# Patient Record
Sex: FEMALE | Race: ASIAN | Employment: UNEMPLOYED | URBAN - METROPOLITAN AREA
[De-identification: names, ages, dates, MRNs, and addresses within clinical notes are randomized per-mention and may not be internally consistent; named-entity substitution may affect disease eponyms.]

---

## 2018-04-26 ENCOUNTER — HOSPITAL ENCOUNTER (OUTPATIENT)
Dept: ULTRASOUND IMAGING | Age: 53
Discharge: HOME | End: 2018-04-26

## 2018-04-26 ENCOUNTER — CLINICAL DOCUMENTATION (OUTPATIENT)
Dept: CASE MANAGEMENT | Age: 53
End: 2018-04-26

## 2018-04-26 DIAGNOSIS — R74.8 ELEVATED LIVER ENZYMES: ICD-10-CM

## 2018-04-26 DIAGNOSIS — Z12.31 VISIT FOR SCREENING MAMMOGRAM: ICD-10-CM

## 2018-04-26 DIAGNOSIS — R74.8 ABNORMAL LEVELS OF OTHER SERUM ENZYMES: ICD-10-CM

## 2018-05-16 ENCOUNTER — HOSPITAL ENCOUNTER (OUTPATIENT)
Dept: MAMMOGRAPHY | Age: 53
Discharge: HOME | End: 2018-05-16

## 2018-05-16 DIAGNOSIS — R92.8 ABNORMAL MAMMOGRAM: ICD-10-CM

## 2018-09-06 ENCOUNTER — HOSPITAL ENCOUNTER (OUTPATIENT)
Dept: NEUROLOGY | Age: 53
Discharge: HOME | End: 2018-09-06

## 2018-09-06 DIAGNOSIS — R20.0 ANESTHESIA OF SKIN: ICD-10-CM
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
## 2018-10-04 PROCEDURE — 93005 ELECTROCARDIOGRAM TRACING: CPT | Performed by: EMERGENCY MEDICINE

## 2018-10-04 RX ORDER — IBUPROFEN 600 MG/1
TABLET ORAL
Refills: 2 | COMMUNITY
Start: 2018-08-26 | End: 2018-10-05

## 2018-10-04 RX ORDER — IBUPROFEN AND FAMOTIDINE 800; 26.6 MG/1; MG/1
TABLET, COATED ORAL
Refills: 2 | COMMUNITY
Start: 2018-08-26 | End: 2021-08-05

## 2018-10-04 RX ORDER — BUSPIRONE HYDROCHLORIDE 10 MG/1
TABLET ORAL
Refills: 1 | COMMUNITY
Start: 2018-08-31 | End: 2021-08-05

## 2018-10-04 RX ORDER — PAROXETINE 30 MG/1
TABLET, FILM COATED ORAL
Refills: 1 | COMMUNITY
Start: 2018-08-31 | End: 2021-08-05

## 2018-10-05 ENCOUNTER — HOSPITAL ENCOUNTER (EMERGENCY)
Age: 53
Discharge: HOME | End: 2018-10-05
Attending: EMERGENCY MEDICINE
Payer: COMMERCIAL

## 2018-10-05 VITALS
TEMPERATURE: 98.2 F | SYSTOLIC BLOOD PRESSURE: 101 MMHG | BODY MASS INDEX: 29.45 KG/M2 | DIASTOLIC BLOOD PRESSURE: 71 MMHG | OXYGEN SATURATION: 99 % | WEIGHT: 150 LBS | HEIGHT: 60 IN | HEART RATE: 61 BPM | RESPIRATION RATE: 18 BRPM

## 2018-10-05 DIAGNOSIS — R51.9 NONINTRACTABLE HEADACHE, UNSPECIFIED CHRONICITY PATTERN, UNSPECIFIED HEADACHE TYPE: ICD-10-CM

## 2018-10-05 DIAGNOSIS — R42 DIZZINESS: Primary | ICD-10-CM

## 2018-10-05 LAB
BACTERIA: ABNORMAL /HPF
BILIRUBIN URINE: NEGATIVE
BLOOD, URINE: ABNORMAL
CLARITY: CLEAR
COLOR: YELLOW
EPITHELIAL CELLS, UA: 2 /HPF (ref 0–5)
GLUCOSE URINE: NEGATIVE MG/DL
HYALINE CASTS: 0 /LPF (ref 0–8)
KETONES, URINE: NEGATIVE MG/DL
LEUKOCYTE ESTERASE, URINE: NEGATIVE
MICROSCOPIC EXAMINATION: YES
NITRITE, URINE: NEGATIVE
PH UA: 5.5
PROTEIN UA: NEGATIVE MG/DL
RBC UA: 1 /HPF (ref 0–4)
SPECIFIC GRAVITY UA: 1.01
URINE REFLEX TO CULTURE: ABNORMAL
URINE TYPE: ABNORMAL
UROBILINOGEN, URINE: 0.2 E.U./DL
WBC UA: 2 /HPF (ref 0–5)

## 2018-10-05 PROCEDURE — 99284 EMERGENCY DEPT VISIT MOD MDM: CPT

## 2018-10-05 PROCEDURE — 81001 URINALYSIS AUTO W/SCOPE: CPT

## 2018-10-05 PROCEDURE — 6360000002 HC RX W HCPCS: Performed by: EMERGENCY MEDICINE

## 2018-10-05 PROCEDURE — 93010 ELECTROCARDIOGRAM REPORT: CPT | Performed by: INTERNAL MEDICINE

## 2018-10-05 PROCEDURE — 6370000000 HC RX 637 (ALT 250 FOR IP): Performed by: EMERGENCY MEDICINE

## 2018-10-05 RX ORDER — IBUPROFEN 600 MG/1
600 TABLET ORAL EVERY 6 HOURS PRN
Qty: 120 TABLET | Refills: 0 | OUTPATIENT
Start: 2018-10-05 | End: 2021-08-05

## 2018-10-05 RX ORDER — ACETAMINOPHEN 500 MG
1000 TABLET ORAL ONCE
Status: COMPLETED | OUTPATIENT
Start: 2018-10-05 | End: 2018-10-05

## 2018-10-05 RX ORDER — MECLIZINE HYDROCHLORIDE 25 MG/1
25 TABLET ORAL 3 TIMES DAILY PRN
Qty: 30 TABLET | Refills: 0 | Status: SHIPPED | OUTPATIENT
Start: 2018-10-05 | End: 2022-07-14 | Stop reason: SDUPTHER

## 2018-10-05 RX ORDER — ONDANSETRON 4 MG/1
4 TABLET, ORALLY DISINTEGRATING ORAL ONCE
Status: COMPLETED | OUTPATIENT
Start: 2018-10-05 | End: 2018-10-05

## 2018-10-05 RX ADMIN — ACETAMINOPHEN 1000 MG: 500 TABLET ORAL at 02:12

## 2018-10-05 RX ADMIN — ONDANSETRON 4 MG: 4 TABLET, ORALLY DISINTEGRATING ORAL at 02:12

## 2018-10-05 ASSESSMENT — PAIN SCALES - GENERAL: PAINLEVEL_OUTOF10: 10

## 2018-10-05 NOTE — ED PROVIDER NOTES
Triage Chief Complaint:   Dizziness (pt c/o room spinning, emesis- started this afternoon)    [place]:  Nilda Siddiqui is a 47 y.o. female that presents to the emergency department today stating that she's been having a headache and some dizziness. She states that after the dizziness started she started to have some nausea and vomiting. She denies any vision changes. She states that she did not have any syncope. She denies any chest pains or shortness of breath. Patient did not take anything for this. She states that she has had this from time to time but it was worse today and so her son brought her in for further evaluation. ROS:  General:  No fevers, no chills, no weakness  Eyes:  No recent vison changes, no discharge  ENT:  No sore throat, no nasal congestion, no hearing changes  Cardiovascular:  No chest pain  Respiratory:  No shortness of breath  Gastrointestinal:  No pain, no nausea, no vomiting, no diarrhea  Musculoskeletal:  No muscle pain, no joint pain  Skin:  No rash, no pruritis, no easy bruising  Neurologic:  No speech problems, + vertigo & headache, no extremity numbness, no extremity tingling, no extremity weakness, no neck pain or stiffness  Psychiatric:  No anxiety  Extremities:  no edema, no pain    History reviewed. No pertinent past medical history. History reviewed. No pertinent surgical history. History reviewed. No pertinent family history. Social History     Social History    Marital status:      Spouse name: N/A    Number of children: N/A    Years of education: N/A     Occupational History    Not on file. Social History Main Topics    Smoking status: Never Smoker    Smokeless tobacco: Never Used    Alcohol use No    Drug use: Unknown    Sexual activity: Not on file     Other Topics Concern    Not on file     Social History Narrative    No narrative on file     No current facility-administered medications for this encounter.       Current Outpatient Prescriptions

## 2018-10-09 LAB
EKG ATRIAL RATE: 67 BPM
EKG DIAGNOSIS: NORMAL
EKG P AXIS: 10 DEGREES
EKG P-R INTERVAL: 134 MS
EKG Q-T INTERVAL: 398 MS
EKG QRS DURATION: 72 MS
EKG QTC CALCULATION (BAZETT): 420 MS
EKG R AXIS: -9 DEGREES
EKG T AXIS: 22 DEGREES
EKG VENTRICULAR RATE: 67 BPM

## 2018-12-11 ENCOUNTER — TELEPHONE (OUTPATIENT)
Dept: ENT CLINIC | Age: 53
End: 2018-12-11

## 2018-12-18 ENCOUNTER — HOSPITAL ENCOUNTER (OUTPATIENT)
Dept: PHYSICAL THERAPY | Age: 53
Setting detail: THERAPIES SERIES
Discharge: HOME | End: 2018-12-18
Payer: COMMERCIAL

## 2018-12-18 PROCEDURE — 97530 THERAPEUTIC ACTIVITIES: CPT

## 2018-12-18 PROCEDURE — 97112 NEUROMUSCULAR REEDUCATION: CPT

## 2018-12-18 PROCEDURE — 97162 PT EVAL MOD COMPLEX 30 MIN: CPT

## 2018-12-18 PROCEDURE — G8990 OTHER PT/OT CURRENT STATUS: HCPCS

## 2018-12-18 PROCEDURE — G8991 OTHER PT/OT GOAL STATUS: HCPCS
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
## 2018-12-26 ENCOUNTER — HOSPITAL ENCOUNTER (OUTPATIENT)
Dept: PHYSICAL THERAPY | Age: 53
Setting detail: THERAPIES SERIES
Discharge: HOME | End: 2018-12-26
Payer: COMMERCIAL

## 2018-12-26 PROCEDURE — 97112 NEUROMUSCULAR REEDUCATION: CPT

## 2018-12-26 NOTE — FLOWSHEET NOTE
perform. A detailed handout was provided to the patient. Manual Treatments:  12/18 - consider CRT as needed, manual stretches for cervical spine    Modalities: 12/18 - Consider MOC    Timed Code Treatment Minutes:  23    Total Treatment Minutes:  23    Treatment/Activity Tolerance:  [x] Patient tolerated treatment well [] Patient limited by fatigue  [] Patient limited by pain  [] Patient limited by other medical complications  [x] Other: Patient was able to perform some habituation exercises without significant, lasting dizziness. Patient demos nystagmus with horizontal head movements.      Prognosis: [x] Good [x] Fair  [] Poor    Patient Requires Follow-up: [x] Yes  [] No    Plan:   [x] Continue per plan of care [] Alter current plan (see comments)  [] Plan of care initiated [] Hold pending MD visit [] Discharge    Plan for Next Session:  Assess response to HEP, continue habituation exercises, administer DGI, balance tasks    Electronically signed by:  Lenora Caro PT

## 2019-01-07 ENCOUNTER — HOSPITAL ENCOUNTER (OUTPATIENT)
Dept: PHYSICAL THERAPY | Age: 54
Setting detail: THERAPIES SERIES
Discharge: HOME | End: 2019-01-07
Payer: COMMERCIAL

## 2019-01-07 PROCEDURE — 97116 GAIT TRAINING THERAPY: CPT

## 2019-01-07 PROCEDURE — 97110 THERAPEUTIC EXERCISES: CPT

## 2019-01-07 PROCEDURE — 97112 NEUROMUSCULAR REEDUCATION: CPT

## 2019-01-09 ENCOUNTER — APPOINTMENT (OUTPATIENT)
Dept: PHYSICAL THERAPY | Age: 54
End: 2019-01-09
Payer: COMMERCIAL

## 2019-01-15 ENCOUNTER — APPOINTMENT (OUTPATIENT)
Dept: PHYSICAL THERAPY | Age: 54
End: 2019-01-15
Payer: COMMERCIAL

## 2019-01-17 ENCOUNTER — APPOINTMENT (OUTPATIENT)
Dept: PHYSICAL THERAPY | Age: 54
End: 2019-01-17
Payer: COMMERCIAL

## 2019-01-22 ENCOUNTER — APPOINTMENT (OUTPATIENT)
Dept: PHYSICAL THERAPY | Age: 54
End: 2019-01-22
Payer: COMMERCIAL

## 2019-01-24 ENCOUNTER — APPOINTMENT (OUTPATIENT)
Dept: PHYSICAL THERAPY | Age: 54
End: 2019-01-24
Payer: COMMERCIAL

## 2019-04-18 ENCOUNTER — HOSPITAL ENCOUNTER (EMERGENCY)
Age: 54
Discharge: HOME | End: 2019-04-18
Payer: COMMERCIAL

## 2019-04-18 VITALS
BODY MASS INDEX: 29.45 KG/M2 | HEIGHT: 60 IN | DIASTOLIC BLOOD PRESSURE: 89 MMHG | SYSTOLIC BLOOD PRESSURE: 138 MMHG | OXYGEN SATURATION: 100 % | RESPIRATION RATE: 15 BRPM | TEMPERATURE: 98.2 F | WEIGHT: 150 LBS | HEART RATE: 79 BPM

## 2019-04-18 DIAGNOSIS — H10.45 OTHER CHRONIC ALLERGIC CONJUNCTIVITIS OF BOTH EYES: ICD-10-CM

## 2019-04-18 DIAGNOSIS — J30.2 SEASONAL ALLERGIC RHINITIS, UNSPECIFIED TRIGGER: Primary | ICD-10-CM

## 2019-04-18 PROCEDURE — 99282 EMERGENCY DEPT VISIT SF MDM: CPT

## 2019-04-18 RX ORDER — LORATADINE 10 MG/1
10 TABLET ORAL DAILY
Qty: 30 TABLET | Refills: 0 | Status: SHIPPED | OUTPATIENT
Start: 2019-04-18 | End: 2021-08-05

## 2019-04-18 RX ORDER — KETOTIFEN FUMARATE 0.35 MG/ML
1 SOLUTION/ DROPS OPHTHALMIC 2 TIMES DAILY
Qty: 1 ML | Refills: 0 | Status: SHIPPED | OUTPATIENT
Start: 2019-04-18 | End: 2019-04-28

## 2019-04-18 RX ORDER — FLUTICASONE PROPIONATE 50 MCG
1 SPRAY, SUSPENSION (ML) NASAL DAILY
Qty: 1 BOTTLE | Refills: 0 | Status: SHIPPED | OUTPATIENT
Start: 2019-04-18 | End: 2021-08-05

## 2019-04-18 ASSESSMENT — ENCOUNTER SYMPTOMS
NAUSEA: 0
DIARRHEA: 0
ABDOMINAL PAIN: 0
VOMITING: 0
COUGH: 0
WHEEZING: 0
SHORTNESS OF BREATH: 0
RHINORRHEA: 0

## 2019-04-19 NOTE — ED PROVIDER NOTES
Positives and Pertinent negatives as per HPI. Except as noted abovein the ROS, all other systems were reviewed and negative. PAST MEDICAL HISTORY   History reviewed. No pertinent past medical history. SURGICAL HISTORY   History reviewed. No pertinent surgical history. [id number]       Discharge Medication List as of 4/18/2019  9:08 PM      CONTINUE these medications which have NOT CHANGED    Details   ibuprofen (IBU) 600 MG tablet Take 1 tablet by mouth every 6 hours as needed for Pain, Disp-120 tablet, R-0Print      busPIRone (BUSPAR) 10 MG tablet TAKE 1 TABLET BY MOUTH TWICE A DAY, R-1Historical Med      DUEXIS 800-26.6 MG TABS TAKE 1 TABLET BY ORAL ROUTE 2 TIMES EVERY DAY AS NEEDED FOR PAIN, R-2, DAWHistorical Med      PARoxetine (PAXIL) 30 MG tablet TAKE 1 TABLET BY MOUTH EVERY DAY, R-1Historical Med               ALLERGIES     Patient has no known allergies. FAMILYHISTORY     History reviewed. No pertinent family history.        SOCIAL HISTORY       Social History     Socioeconomic History    Marital status:      Spouse name: None    Number of children: None    Years of education: None    Highest education level: None   Occupational History    None   Social Needs    Financial resource strain: None    Food insecurity:     Worry: None     Inability: None    Transportation needs:     Medical: None     Non-medical: None   Tobacco Use    Smoking status: Never Smoker    Smokeless tobacco: Never Used   Substance and Sexual Activity    Alcohol use: No    Drug use: None    Sexual activity: None   Lifestyle    Physical activity:     Days per week: None     Minutes per session: None    Stress: None   Relationships    Social connections:     Talks on phone: None     Gets together: None     Attends Yazidi service: None     Active member of club or organization: None     Attends meetings of clubs or organizations: None     Relationship status: None    Intimate partner violence:     Fear of current or ex partner: None     Emotionally abused: None     Physically abused: None     Forced sexual activity: None   Other Topics Concern    None   Social History Narrative    None       SCREENINGS             PHYSICAL EXAM    (up to 7 for level 4, 8 or more for level 5)     ED Triage Vitals [04/18/19 2036]   BP Temp Temp Source Pulse Resp SpO2 Height Weight   138/89 98.2 °F (36.8 °C) Oral 79 15 100 % 5' (1.524 m) 150 lb (68 kg)       Physical Exam   Constitutional: She is oriented to person, place, and time. She appears well-developed and well-nourished. HENT:   Head: Normocephalic and atraumatic. Right Ear: External ear normal.   Left Ear: External ear normal.   Nose: Nose normal.   Eyes: Right eye exhibits no discharge. Left eye exhibits no discharge. Neck: Normal range of motion. Neck supple. Pulmonary/Chest: Effort normal. No respiratory distress. Musculoskeletal: Normal range of motion. Neurological: She is alert and oriented to person, place, and time. Skin: Skin is warm and dry. She is not diaphoretic. Psychiatric: She has a normal mood and affect. Her behavior is normal.   Nursing note and vitals reviewed. DIAGNOSTIC RESULTS   LABS:    Labs Reviewed - No data to display    All other labs were within normal range or not returned as of this dictation. EKG: All EKG's are interpreted by the Emergency Department Physician who either signs orCo-signs this chart in the absence of a cardiologist.  Please see their note for interpretation of EKG. RADIOLOGY:   Non-plain film images such as CT, Ultrasound and MRI are read by the radiologist. Plain radiographic images are visualized andpreliminarily interpreted by the  ED Provider with the below findings:        Interpretation [facility] Radiologist below, if available at the time of this note:    No orders to display     No results found.       PROCEDURES   Unless otherwise noted below, none

## 2020-06-11 ENCOUNTER — HOSPITAL ENCOUNTER (EMERGENCY)
Age: 55
Discharge: HOME | End: 2020-06-11
Payer: COMMERCIAL

## 2020-06-11 ENCOUNTER — APPOINTMENT (OUTPATIENT)
Dept: GENERAL RADIOLOGY | Age: 55
End: 2020-06-11
Payer: COMMERCIAL

## 2020-06-11 VITALS
HEART RATE: 72 BPM | SYSTOLIC BLOOD PRESSURE: 130 MMHG | BODY MASS INDEX: 29.45 KG/M2 | HEIGHT: 60 IN | TEMPERATURE: 98.3 F | DIASTOLIC BLOOD PRESSURE: 88 MMHG | WEIGHT: 150 LBS | OXYGEN SATURATION: 96 % | RESPIRATION RATE: 14 BRPM

## 2020-06-11 PROCEDURE — 72100 X-RAY EXAM L-S SPINE 2/3 VWS: CPT

## 2020-06-11 PROCEDURE — 99283 EMERGENCY DEPT VISIT LOW MDM: CPT

## 2020-06-11 RX ORDER — CYCLOBENZAPRINE HCL 10 MG
10 TABLET ORAL 3 TIMES DAILY PRN
Qty: 20 TABLET | Refills: 0 | Status: SHIPPED | OUTPATIENT
Start: 2020-06-11 | End: 2020-06-21

## 2020-06-11 RX ORDER — LIDOCAINE 50 MG/G
1 PATCH TOPICAL DAILY
Qty: 30 PATCH | Refills: 0 | Status: SHIPPED | OUTPATIENT
Start: 2020-06-11 | End: 2021-07-12

## 2020-06-11 RX ORDER — NAPROXEN 500 MG/1
500 TABLET ORAL 2 TIMES DAILY
Qty: 20 TABLET | Refills: 0 | OUTPATIENT
Start: 2020-06-11 | End: 2021-08-05

## 2020-06-11 ASSESSMENT — PAIN SCALES - GENERAL: PAINLEVEL_OUTOF10: 5

## 2020-06-11 ASSESSMENT — PAIN DESCRIPTION - LOCATION: LOCATION: BACK

## 2020-06-11 ASSESSMENT — PAIN DESCRIPTION - DESCRIPTORS: DESCRIPTORS: ACHING

## 2020-06-11 ASSESSMENT — PAIN DESCRIPTION - ORIENTATION: ORIENTATION: RIGHT;LOWER

## 2020-06-11 ASSESSMENT — PAIN DESCRIPTION - PAIN TYPE: TYPE: CHRONIC PAIN
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
## 2020-06-17 ENCOUNTER — HOSPITAL ENCOUNTER (EMERGENCY)
Age: 55
Discharge: HOME | End: 2020-06-17
Payer: COMMERCIAL

## 2020-06-17 ENCOUNTER — APPOINTMENT (OUTPATIENT)
Dept: CT IMAGING | Age: 55
End: 2020-06-17
Payer: COMMERCIAL

## 2020-06-17 VITALS
DIASTOLIC BLOOD PRESSURE: 82 MMHG | OXYGEN SATURATION: 98 % | RESPIRATION RATE: 16 BRPM | TEMPERATURE: 97.2 F | BODY MASS INDEX: 29.45 KG/M2 | HEART RATE: 89 BPM | SYSTOLIC BLOOD PRESSURE: 134 MMHG | WEIGHT: 150 LBS | HEIGHT: 60 IN

## 2020-06-17 LAB
A/G RATIO: 1.3 (ref 1.1–2.2)
ALBUMIN SERPL-MCNC: 4.4 G/DL (ref 3.4–5)
ALP BLD-CCNC: 86 U/L (ref 40–129)
ALT SERPL-CCNC: 89 U/L (ref 10–40)
ANION GAP SERPL CALCULATED.3IONS-SCNC: 11 MMOL/L (ref 3–16)
AST SERPL-CCNC: 90 U/L (ref 15–37)
BASOPHILS ABSOLUTE: 0 K/UL (ref 0–0.2)
BASOPHILS RELATIVE PERCENT: 0.7 %
BILIRUB SERPL-MCNC: 0.8 MG/DL (ref 0–1)
BILIRUBIN URINE: NEGATIVE
BLOOD, URINE: NEGATIVE
BUN BLDV-MCNC: 18 MG/DL (ref 7–20)
CALCIUM SERPL-MCNC: 9.3 MG/DL (ref 8.3–10.6)
CHLORIDE BLD-SCNC: 102 MMOL/L (ref 99–110)
CLARITY: CLEAR
CO2: 22 MMOL/L (ref 21–32)
COLOR: YELLOW
CREAT SERPL-MCNC: 0.6 MG/DL (ref 0.6–1.1)
CRYSTALS, UA: ABNORMAL /HPF
EOSINOPHILS ABSOLUTE: 0.1 K/UL (ref 0–0.6)
EOSINOPHILS RELATIVE PERCENT: 3.4 %
EPITHELIAL CELLS, UA: 4 /HPF (ref 0–5)
GFR AFRICAN AMERICAN: >60
GFR NON-AFRICAN AMERICAN: >60
GLOBULIN: 3.5 G/DL
GLUCOSE BLD-MCNC: 94 MG/DL (ref 70–99)
GLUCOSE URINE: NEGATIVE MG/DL
HCG QUALITATIVE: NEGATIVE
HCT VFR BLD CALC: 47 % (ref 36–48)
HEMOGLOBIN: 16.1 G/DL (ref 12–16)
HYALINE CASTS: 2 /LPF (ref 0–8)
KETONES, URINE: NEGATIVE MG/DL
LEUKOCYTE ESTERASE, URINE: ABNORMAL
LYMPHOCYTES ABSOLUTE: 1 K/UL (ref 1–5.1)
LYMPHOCYTES RELATIVE PERCENT: 24.9 %
MCH RBC QN AUTO: 31.2 PG (ref 26–34)
MCHC RBC AUTO-ENTMCNC: 34.2 G/DL (ref 31–36)
MCV RBC AUTO: 91 FL (ref 80–100)
MICROSCOPIC EXAMINATION: YES
MONOCYTES ABSOLUTE: 0.4 K/UL (ref 0–1.3)
MONOCYTES RELATIVE PERCENT: 9.9 %
NEUTROPHILS ABSOLUTE: 2.5 K/UL (ref 1.7–7.7)
NEUTROPHILS RELATIVE PERCENT: 61.1 %
NITRITE, URINE: NEGATIVE
PDW BLD-RTO: 14.2 % (ref 12.4–15.4)
PH UA: 6 (ref 5–8)
PLATELET # BLD: 159 K/UL (ref 135–450)
PMV BLD AUTO: 9.6 FL (ref 5–10.5)
POTASSIUM SERPL-SCNC: 4.1 MMOL/L (ref 3.5–5.1)
PROTEIN UA: NEGATIVE MG/DL
RBC # BLD: 5.17 M/UL (ref 4–5.2)
RBC UA: ABNORMAL /HPF (ref 0–4)
SODIUM BLD-SCNC: 135 MMOL/L (ref 136–145)
SPECIFIC GRAVITY UA: 1.02 (ref 1–1.03)
TOTAL PROTEIN: 7.9 G/DL (ref 6.4–8.2)
URINE REFLEX TO CULTURE: ABNORMAL
URINE TYPE: ABNORMAL
UROBILINOGEN, URINE: 0.2 E.U./DL
WBC # BLD: 4.1 K/UL (ref 4–11)
WBC UA: 5 /HPF (ref 0–5)

## 2020-06-17 PROCEDURE — 84703 CHORIONIC GONADOTROPIN ASSAY: CPT

## 2020-06-17 PROCEDURE — 80053 COMPREHEN METABOLIC PANEL: CPT

## 2020-06-17 PROCEDURE — 99284 EMERGENCY DEPT VISIT MOD MDM: CPT

## 2020-06-17 PROCEDURE — 81001 URINALYSIS AUTO W/SCOPE: CPT

## 2020-06-17 PROCEDURE — 85025 COMPLETE CBC W/AUTO DIFF WBC: CPT

## 2020-06-17 PROCEDURE — 74176 CT ABD & PELVIS W/O CONTRAST: CPT

## 2020-06-17 PROCEDURE — 6370000000 HC RX 637 (ALT 250 FOR IP): Performed by: PHYSICIAN ASSISTANT

## 2020-06-17 RX ORDER — METHYLPREDNISOLONE 4 MG/1
2 TABLET ORAL DAILY
Qty: 3 TABLET | Refills: 0 | Status: SHIPPED | OUTPATIENT
Start: 2020-06-17 | End: 2020-06-23

## 2020-06-17 RX ORDER — HYDROCODONE BITARTRATE AND ACETAMINOPHEN 5; 325 MG/1; MG/1
1 TABLET ORAL ONCE
Status: COMPLETED | OUTPATIENT
Start: 2020-06-17 | End: 2020-06-17

## 2020-06-17 RX ADMIN — HYDROCODONE BITARTRATE AND ACETAMINOPHEN 1 TABLET: 5; 325 TABLET ORAL at 21:10

## 2020-06-17 ASSESSMENT — ENCOUNTER SYMPTOMS
COUGH: 0
ABDOMINAL PAIN: 0
DIARRHEA: 0
COLOR CHANGE: 0
TROUBLE SWALLOWING: 0
STRIDOR: 0
EYE REDNESS: 0
FACIAL SWELLING: 1
PHOTOPHOBIA: 0
BACK PAIN: 1
EYE DISCHARGE: 0
EYE PAIN: 0
ABDOMINAL DISTENTION: 0
WHEEZING: 0
EYE ITCHING: 0
SORE THROAT: 0
VOICE CHANGE: 0
CONSTIPATION: 0
NAUSEA: 0
SHORTNESS OF BREATH: 0
VOMITING: 0

## 2020-06-17 ASSESSMENT — PAIN SCALES - GENERAL
PAINLEVEL_OUTOF10: 9
PAINLEVEL_OUTOF10: 9

## 2020-06-17 ASSESSMENT — PAIN DESCRIPTION - LOCATION: LOCATION: BACK;HIP;PELVIS

## 2020-06-17 ASSESSMENT — PAIN DESCRIPTION - PAIN TYPE: TYPE: ACUTE PAIN

## 2020-06-17 NOTE — ED PROVIDER NOTES
[facility]  [id number]    **EVALUATED BY ADVANCED PRACTICE PROVIDER**        [address] ENCOUNTER      Pt Name: Raymond Mc  ZEL:[id number]  Elder 1965  Date of evaluation: 6/17/2020  Provider: Carlos Bradley PA-C      Chief Complaint:    Chief Complaint   Patient presents with    Hip Pain     pt reports having left hip and left pelvic pain is scheduled to see pcp tomorrow but the pain is to much that she couldn't wait that long. [facility]  [id number]. pt also report noticing swelling under her eyes and her feet. Nursing Notes, Past Medical Hx, Past Surgical Hx, Social Hx, Allergies, and Family Hx were all reviewed and agreed with or any disagreements were addressed in the HPI.    HPI:  (Location, Duration, Timing, Severity, Quality, Assoc Sx, Context, Modifying factors)  This is a  49 y.o. female who presents to the emergency department with complaints of low back pain for several weeks. Patient was seen here recently for low back pain and had an x-ray of her lumbar spine which was unremarkable. She was sent home with instructions for physical exercises and some medication for her discomfort which helps minimally. She has no pain when tomorrow with her primary care doctor. She states that the pain often radiates out to bilateral flanks. Despite triage note, patient denies any pelvic pain to me. Denies any abdominal pain, nausea, vomiting, diarrhea, constipation, bowel or bladder incontinence or retention or acute urinary symptoms. The patient would also like to address some mild swelling she gets in her ankles at the end of the day that resolves when she props her feet up. She would also like to address swelling in her face when she wakes up in the morning that gets better throughout the day. PastMedical/Surgical History:  History reviewed. No pertinent past medical history. History reviewed.  No pertinent surgical history. Medications:  Discharge Medication List as of 6/17/2020  8:56 PM      CONTINUE these medications which have NOT CHANGED    Details   naproxen (NAPROSYN) 500 MG tablet Take 1 tablet by mouth 2 times daily for 20 doses, Disp-20 tablet, R-0Print      lidocaine (LIDODERM) 5 % Place 1 patch onto the skin daily 12 hours on, 12 hours off., Disp-30 patch, R-0Print      cyclobenzaprine (FLEXERIL) 10 MG tablet Take 1 tablet by mouth 3 times daily as needed for Muscle spasms, Disp-20 tablet, R-0Print      loratadine (CLARITIN) 10 MG tablet Take 1 tablet by mouth daily, Disp-30 tablet, R-0Print      ibuprofen (IBU) 600 MG tablet Take 1 tablet by mouth every 6 hours as needed for Pain, Disp-120 tablet, R-0Print      busPIRone (BUSPAR) 10 MG tablet TAKE 1 TABLET BY MOUTH TWICE A DAY, R-1Historical Med      DUEXIS 800-26.6 MG TABS TAKE 1 TABLET BY ORAL ROUTE 2 TIMES EVERY DAY AS NEEDED FOR PAIN, R-2, DAWHistorical Med      PARoxetine (PAXIL) 30 MG tablet TAKE 1 TABLET BY MOUTH EVERY DAY, R-1Historical Med      fluticasone (FLONASE) 50 MCG/ACT nasal spray 1 spray by Nasal route daily, Disp-1 Bottle, R-0Print               Review of Systems:  Review of Systems   Constitutional: Negative for chills and fever. HENT: Positive for facial swelling. Negative for congestion, dental problem, drooling, ear discharge, ear pain, sneezing, sore throat, tinnitus, trouble swallowing and voice change. Eyes: Negative for photophobia, pain, discharge, redness, itching and visual disturbance. Respiratory: Negative for cough, shortness of breath, wheezing and stridor. Cardiovascular: Positive for leg swelling. Negative for chest pain and palpitations. Gastrointestinal: Negative for abdominal distention, abdominal pain, constipation, diarrhea, nausea and vomiting. Endocrine: Negative for polydipsia, polyphagia and polyuria. Genitourinary: Positive for flank pain.  Negative for difficulty urinating, dysuria, frequency, extremity or facial edema or swelling. No posterior calf or thigh tenderness, palpable cord, discoloration. Negative homans. Neurological:      General: No focal deficit present. Mental Status: She is alert and oriented to person, place, and time. Cranial Nerves: No cranial nerve deficit (II-XII intact). Psychiatric:         Mood and Affect: Mood normal.         Behavior: Behavior normal.         MEDICAL DECISION MAKING    Vitals:    Vitals:    06/17/20 1904   BP: 134/82   Pulse: 89   Resp: 16   Temp: 97.2 °F (36.2 °C)   TempSrc: Oral   SpO2: 98%   Weight: 150 lb (68 kg)   Height: 5' (1.524 m)       LABS:  Labs Reviewed   URINE RT REFLEX TO CULTURE - Abnormal; Notable for the following components:       Result Value    Leukocyte Esterase, Urine SMALL (*)     All other components within normal limits    Narrative:     Performed at:  [facility] [address] [facility] [place]   Phone [phone]   CBC WITH AUTO DIFFERENTIAL - Abnormal; Notable for the following components:    Hemoglobin 16.1 (*)     All other components within normal limits    Narrative:     Performed at:  [facility] [address] [address] [employer]   Phone [phone]   COMPREHENSIVE METABOLIC PANEL - Abnormal; Notable for the following components:    Sodium 135 (*)     ALT 89 (*)     AST 90 (*)     All other components within normal limits    Narrative:     Performed at:  [facility] [address],  [place], [place]   Phone [phone]   MICROSCOPIC URINALYSIS - Abnormal; Notable for the following components:    Crystals, UA 1+ Ca.  Oxalate (*)     All other components within normal limits    Narrative:     Performed at:  [facility]  [address] [facility] [place]   Phone [phone]   HCG, SERUM, QUALITATIVE    Narrative:     Performed at:  [facility] fistula, shingles or other concerning pathology. We have addressed concerns and expectations. The patient tolerated their visit well. I evaluated the patient. The physician was available for consultation as needed. The patient and / or the family were informed of the results of any tests, a time was given to answer questions, a plan was proposed and they agreed with plan. CLINICAL IMPRESSION:  1. Acute bilateral low back pain without sciatica    2. Facial swelling    3.  Ankle edema        DISPOSITION Decision To Discharge 06/17/2020 08:53:56 PM      PATIENT REFERRED TO:  HOWARD eBck - CNP  [address] [place] [address]  [phone]      for follow up tomorrow as scheduled    [facility] Emergency Department  [address] [facility] [place]  [phone]    If symptoms worsen      DISCHARGE MEDICATIONS:  Discharge Medication List as of 6/17/2020  8:56 PM      START taking these medications    Details   methylPREDNISolone (MEDROL) 4 MG tablet Take 0.5 tablets by mouth daily for 6 days, Disp-3 tablet, R-0Print             DISCONTINUED MEDICATIONS:  Discharge Medication List as of 6/17/2020  8:56 PM                 (Please note the MDM and HPI sections of this note were completed with a voice recognition program.  Efforts were made to edit the dictations but occasionally words are mis-transcribed.)    Electronically signed, Matthieu Valencia PA-C,           Matthieu Valencia PA-C  06/17/20 2980

## 2020-06-23 ENCOUNTER — HOSPITAL ENCOUNTER (OUTPATIENT)
Dept: PHYSICAL THERAPY | Age: 55
Setting detail: THERAPIES SERIES
Discharge: HOME | End: 2020-06-23
Payer: COMMERCIAL

## 2020-06-23 PROCEDURE — 97530 THERAPEUTIC ACTIVITIES: CPT

## 2020-06-23 PROCEDURE — 97110 THERAPEUTIC EXERCISES: CPT

## 2020-06-23 PROCEDURE — 97161 PT EVAL LOW COMPLEX 20 MIN: CPT

## 2020-06-23 NOTE — FLOWSHEET NOTE
[address] [facility]  Phone: [phone]   Fax: [phone]    Physical Therapy Daily Treatment Note  Date:  2020    Patient Name:  Russ Leblanc    :  1965  MRN: [id number]  Medical/Treatment Diagnosis Information:  Diagnosis: M54.5 (ICD-10-CM) - Low back pain  Treatment Diagnosis: decreased lumbar ROM, poor core activation, TTP R lumbar paraspinals, R QL, and R glute at iliac insertion, dec hip strength  Insurance/Certification information:  PT Insurance Information: [employer] 30 visits/yr  Physician Information:  Referring Practitioner: HOWARD Song CNP  Plan of care signed (Y/N): []  Yes [x]  No Sent     Date of Patient follow up with Physician:      Progress Report: []  Yes  [x]  No     Date Range for reporting period:  Beginnin2020  Ending:     Progress report due (10 Rx/or 30 days whichever is less): visit #10 or      Recertification due (POC duration/ or 90 days whichever is less):  2020     Visit # Insurance Allowable Auth required?  Date Range   1 30/yr []  Yes  [x]  No Through      Latex Allergy:  [x]NO      []YES  Preferred Language for Healthcare:   []English       [x]other: English    Functional Scale:        Date assessed:  OSWESTRY NOT ASSESSED THIS DATE - ASSESS AT NPV    Pain level:  7/10     SUBJECTIVE:  See eval    OBJECTIVE: See eval      RESTRICTIONS/PRECAUTIONS: none    Exercises/Interventions:     Therapeutic Exercises (55975) Resistance / level Sets/sec Reps Notes                 Supine:  · PPT  · LTR  · Glute set  · March    X 5\" hold  2  X 5\" hold  2   10  10  20  10   Pt required significant verbal and tactile cues to engage TrA                                             Therapeutic Activities (40381)              Education provided: pathomechanics of injury, expectations for therapy, HEP review   X 15 min Pt with good understanding of all education provided and eccentric single leg control. [] UE / cervical: cervical, scapular, scapulothoracic and UE control with self care, reaching, carrying, lifting, house/yardwork, driving, computer work. NMR and Therapeutic Activities:    [x] (54165 or 30027) Provided verbal/tactile cueing for activities related to improving balance, coordination, kinesthetic sense, posture, motor skill, proprioception and motor activation to allow for proper function of   [x] LE: / Lumbar core, proximal hip and LE with self care and ADLs  [] UE / Cervical: cervical, postural, scapular, scapulothoracic and UE control with self care, carrying, lifting, driving, computer work.   [] (29954) Gait Re-education- Provided training and instruction to the patient for proper LE, core and proximal hip recruitment and positioning and eccentric body weight control with ambulation re-education including up and down stairs     Home Management Training / Self Care:  [] (86191) Provided self-care/home management training related to activities of daily living and compensatory training, and/or use of adaptive equipment for improvement with: ADLs and compensatory training, meal preparation, safety procedures and instruction in use of adaptive equipment, including bathing, grooming, dressing, personal hygiene, basic household cleaning and chores.      Home Exercise Program:    [x] (71820) Reviewed/Progressed HEP activities related to strengthening, flexibility, endurance, ROM of   [x] LE / Lumbar: core, proximal hip and LE for functional self-care, mobility, lifting and ambulation/stair navigation   [] UE / Cervical: cervical, postural, scapular, scapulothoracic and UE control with self care, reaching, carrying, lifting, house/yardwork, driving, computer work  [] (86652)Reviewed/Progressed HEP activities related to improving balance, coordination, kinesthetic sense, posture, motor skill, proprioception of   [] LE: core, proximal hip and LE for self care, mobility, lifting, and ambulation/stair navigation    [] UE / Cervical: cervical, postural,  scapular, scapulothoracic and UE control with self care, reaching, carrying, lifting, house/yardwork, driving, computer work    Manual Treatments:  PROM / STM / Oscillations-Mobs:  G-I, II, III, IV (PA's, Inf., Post.)  [] (11939) Provided manual therapy to mobilize LE, proximal hip and/or LS spine soft tissue/joints for the purpose of modulating pain, promoting relaxation,  increasing ROM, reducing/eliminating soft tissue swelling/inflammation/restriction, improving soft tissue extensibility and allowing for proper ROM for normal function with   [] LE / lumbar: self care, mobility, lifting and ambulation. [] UE / Cervical: self care, reaching, carrying, lifting, house/yardwork, driving, computer work. Modalities:  [] (83243) Vasopneumatic compression: Utilized vasopneumatic compression to decrease edema / swelling for the purpose of improving mobility and quad tone / recruitment which will allow for increased overall function including but not limited to self-care, transfers, ambulation, and ascending / descending stairs. Modalities:      Charges:  Timed Code Treatment Minutes: 40   Total Treatment Minutes: 60     [x] EVAL - LOW (81609)   [] EVAL - MOD (42115)  [] EVAL - HIGH (44622)  [] RE-EVAL (83426)  [x] OE(42613) x 2      [] Ionto  [] NMR (30865) x       [] Vaso  [] Manual (46879) x       [] Ultrasound  [x] TA x   1     [] Mech Traction (83347)  [] Aquatic Therapy x     [] ES (un) (00714):   [] Home Management Training x  [] ES(attended) (92550)   [] Dry Needling 1-2 muscles (79640):  [] Dry Needling 3+ muscles (822267)  [] Group:      [] Other:     GOALS:   Patient stated goal: decreased pain during functional mobility  [] Progressing: [] Met: [] Not Met: [] Adjusted    Therapist goals for Patient:   Short Term Goals: To be achieved in: 2 weeks  1.  Independent in HEP and progression per patient tolerance, in order

## 2020-06-23 NOTE — PROGRESS NOTES
wraps around to anterior R hip. Pt denies bowel/bladder changes. Fear avoidance: I should not do physical activities that (might) make my pain worse   [x] True   [] False     Relevant Medical History: unremarkable  Functional Outcome: Oswestry: raw score = not assessed; dysfunction = not assessed%    Pain Scale: 7/10  Easing factors: medication  Provocative factors: sitting, walking, sleeping     Type: [x]Constant   []Intermittent  []Radiating [x]Localized []other:     Numbness/Tingling: pt denies    Occupation/School: none    Living Status/Prior Level of Function: Prior to this injury / incident, pt was independent with ADLs and IADLs, no pain at baseline, fully functional, though she was careful during all mobility as to not exacerbate her pain. Pt occasionally cooks and cleans, though previous history of R UE pain limits these tasks.     OBJECTIVE:   Palpation: TTP R lumbar paraspinals, R gluteal insertion on ilium, R QL tenderness    Functional Mobility/Transfers: WFL    Posture: WFL    Inspection: WFL    Gait: (include devices/WB status) no AD, decreased sonido, decreased trunk rotation    Bandages/Dressings/Incisions: NA    Dermatomes Normal Abnormal Comments   inguinal area (L1)  Y     anterior mid-thigh (L2) Y     distal ant thigh/med knee (L3) Y     medial lower leg and foot (L4) Y     lateral lower leg and foot (L5) Y     posterior calf (S1) Y     medial calcaneus (S2) Y         ROM  Comments   Lumbar Flex Limited 20% (+) pain   Lumbar Ext Limited 50% (++) pain     ROM LEFT RIGHT Comments   Lumbar Side Bend Limited 25% Limited 25% (+) pain L, (++) pain R   Lumbar Rotation Limited 50% Limited 50% (+) pain B   Hip Flexion [facility] WFL    Hip Abd [facility]    Hip ER [facility]    Hip IR [facility] WFL    Hip Extension [facility] WFL    Knee Ext [facility] WFL    Knee Flex [facility] WFL    Hamstring Flex 70 deg 70 deg    Piriformis [facility]                    Joint mobility: not assessed; pt did not tolerate Justification  [x] A history of present problem with:  [x] no personal factors and/or comorbidities that impact the plan of care;  []1-2 personal factors and/or comorbidities that impact the plan of care  []3 personal factors and/or comorbidities that impact the plan of care  [x] An examination of body systems using standardized tests and measures addressing any of the following: body structures and functions (impairments), activity limitations, and/or participation restrictions;:  [] a total of 1-2 or more elements   [x] a total of 3 or more elements   [] a total of 4 or more elements   [x] A clinical presentation with:  [x] stable and/or uncomplicated characteristics   [] evolving clinical presentation with changing characteristics  [] unstable and unpredictable characteristics;   [x] Clinical decision making of [x] low, [] moderate, [] high complexity using standardized patient assessment instrument and/or measurable assessment of functional outcome. [x] EVAL (LOW) 29951 (typically 15 minutes face-to-face)  [] EVAL (MOD) 42706 (typically 30 minutes face-to-face)  [] EVAL (HIGH) 04662 (typically 45 minutes face-to-face)  [] RE-EVAL     PLAN: Begin PT focusing on: proximal hip mobilizations, LB mobs, LB core activation, proximal hip activation, and HEP    Frequency/Duration:  1-2 days per week for 8 Weeks:  Interventions:  [x]  Therapeutic exercise including: strength training, ROM, for LE, Glutes and core   [x]  NMR activation and proprioception for glutes , LE and Core   [x]  Manual therapy as indicated for Hip complex, LE and spine to include: Dry Needling/IASTM, STM, PROM, Gr I-IV mobilizations, manipulation. [x]  Modalities as needed that may include: thermal agents, E-stim, Biofeedback, US, iontophoresis as indicated  [x]  Patient education on joint protection, postural re-education, activity modification, progression of HEP.     HEP instruction: Written HEP instructions provided and reviewed GOALS:  Patient stated goal: decreased pain during functional mobility  [] Progressing: [] Met: [] Not Met: [] Adjusted    Therapist goals for Patient:   Short Term Goals: To be achieved in: 2 weeks  1. Independent in HEP and progression per patient tolerance, in order to prevent re-injury. [] Progressing: [] Met: [] Not Met: [] Adjusted  2. Patient will have a decrease in pain to facilitate improvement in movement, function, and ADLs as indicated by Functional Deficits. [] Progressing: [] Met: [] Not Met: [] Adjusted    Long Term Goals: To be achieved in: 8 weeks  1. Disability index score of 30% or less for the ALENA to assist with reaching prior level of function. [] Progressing: [] Met: [] Not Met: [] Adjusted  2. Patient will demonstrate increased AROM to WNL, good LS mobility, good hip ROM to allow for proper joint functioning as indicated by patients Functional Deficits. [] Progressing: [] Met: [] Not Met: [] Adjusted  3. Patient will demonstrate an increase in Strength to good proximal hip and core activation to allow for proper functional mobility as indicated by patients Functional Deficits. [] Progressing: [] Met: [] Not Met: [] Adjusted  4. Patient will return to functional activities including sitting, standing, walking without increased symptoms or restriction.    [] Progressing: [] Met: [] Not Met: [] Adjusted      Electronically signed by:  Tiffanie Williamson PT

## 2020-06-30 ENCOUNTER — HOSPITAL ENCOUNTER (OUTPATIENT)
Dept: PHYSICAL THERAPY | Age: 55
Setting detail: THERAPIES SERIES
Discharge: HOME | End: 2020-06-30
Payer: COMMERCIAL

## 2020-06-30 PROCEDURE — 97140 MANUAL THERAPY 1/> REGIONS: CPT

## 2020-06-30 PROCEDURE — 97110 THERAPEUTIC EXERCISES: CPT

## 2020-06-30 NOTE — FLOWSHEET NOTE
[facility] Physical Therapy  Phone: [phone]   Fax: [phone]    Physical Therapy Daily Treatment Note  Date:  2020    Patient Name:  Holly Baldwin    :  1965  MRN: [id number]  Medical/Treatment Diagnosis Information:  Diagnosis: M54.5 (ICD-10-CM) - Low back pain  Treatment Diagnosis: decreased lumbar ROM, poor core activation, TTP R lumbar paraspinals, R QL, and R glute at iliac insertion, dec hip strength  Insurance/Certification information:  PT Insurance Information: [employer] 30 visits/yr  Physician Information:  Referring Practitioner: HOWARD Penny CNP  Plan of care signed (Y/N): []  Yes [x]  No Sent     Date of Patient follow up with Physician:      Progress Report: []  Yes  [x]  No     Date Range for reporting period:  Beginnin2020  Ending:     Progress report due (10 Rx/or 30 days whichever is less): visit #10 or      Recertification due (POC duration/ or 90 days whichever is less):  2020     Visit # Insurance Allowable Auth required? Date Range   2 30/yr []  Yes  [x]  No Through      Latex Allergy:  [x]NO      []YES  Preferred Language for Healthcare:   []English       [x]other: Kiswahili    Functional Scale:        Date assessed:  Oswestry: raw score = 31/50; dysfunction = 62%             20    Pain level:  5/10     SUBJECTIVE:  Phone  not used this date, pt stated she could understand some English and would not need the  today. Pt reports mild pain starting in R low back wrapping around R hip. Pt states she has been doing her exercises at home and has no questions regarding HEP on this date.     OBJECTIVE:   : pt 15 min late to appt      RESTRICTIONS/PRECAUTIONS: none    Exercises/Interventions:     Therapeutic Exercises (29397) Resistance / level Sets/sec Reps Notes   Sci-fit  1.0 X 6 min            Supine:  · PPT  · LTR  · Glute set  · March    2 X 5\" hold  2  X 5\" hold  2 10  10  20  10                                                Therapeutic Activities (16077)              Education provided: pathomechanics of injury, expectations for therapy, HEP review    Pt with good understanding of all education provided                        Neuromuscular Re-ed (07772)              SB \"squeezes\"/crunch; B UE/LE's  2 10                                Manual Intervention (01070)              STM R lumbar paraspinals, R QL  X 12 min                                     Pt. Education:  -patient educated on diagnosis, prognosis and expectations for rehab  -all patient questions were answered    Home Exercise Program:  Access Code: [email or web address]   URL: Rico/   Date: 06/23/2020   Prepared by: Nallely Lis Huml     Exercises   Supine Posterior Pelvic Tilt - 10 reps - 2 sets - 5 hold - 1x daily - 7x weekly   Supine Lower Trunk Rotation - 10 reps - 2 sets - 1x daily - 7x weekly   Hooklying Gluteal Sets - 10 reps - 2 sets - 1x daily - 7x weekly   Supine March - 10 reps - 2 sets - 1x daily - 7x weekly         Therapeutic Exercise and NMR EXR  [x] (36013) Provided verbal/tactile cueing for activities related to strengthening, flexibility, endurance, ROM for improvements in  [x] LE / Lumbar: LE, proximal hip, and core control with self care, mobility, lifting, ambulation. [] UE / Cervical: cervical, postural, scapular, scapulothoracic and UE control with self care, reaching, carrying, lifting, house/yardwork, driving, computer work.  [] (66346) Provided verbal/tactile cueing for activities related to improving balance, coordination, kinesthetic sense, posture, motor skill, proprioception to assist with   [] LE / lumbar: LE, proximal hip, and core control in self care, mobility, lifting, ambulation and eccentric single leg control. [] UE / cervical: cervical, scapular, scapulothoracic and UE control with self care, reaching, carrying, lifting, house/yardwork, driving, computer work.    [] (16819) Therapist is in constant attendance of 2 or more patients providing skilled therapy interventions, but not providing any significant amount of measurable one-on-one time to either patient, for improvements in  [] LE / lumbar: LE, proximal hip, and core control in self care, mobility, lifting, ambulation and eccentric single leg control. [] UE / cervical: cervical, scapular, scapulothoracic and UE control with self care, reaching, carrying, lifting, house/yardwork, driving, computer work. NMR and Therapeutic Activities:    [x] (44416 or 71909) Provided verbal/tactile cueing for activities related to improving balance, coordination, kinesthetic sense, posture, motor skill, proprioception and motor activation to allow for proper function of   [x] LE: / Lumbar core, proximal hip and LE with self care and ADLs  [] UE / Cervical: cervical, postural, scapular, scapulothoracic and UE control with self care, carrying, lifting, driving, computer work.   [] (70949) Gait Re-education- Provided training and instruction to the patient for proper LE, core and proximal hip recruitment and positioning and eccentric body weight control with ambulation re-education including up and down stairs     Home Management Training / Self Care:  [] (59109) Provided self-care/home management training related to activities of daily living and compensatory training, and/or use of adaptive equipment for improvement with: ADLs and compensatory training, meal preparation, safety procedures and instruction in use of adaptive equipment, including bathing, grooming, dressing, personal hygiene, basic household cleaning and chores.      Home Exercise Program:    [x] (44302) Reviewed/Progressed HEP activities related to strengthening, flexibility, endurance, ROM of   [x] LE / Lumbar: core, proximal hip and LE for functional self-care, mobility, lifting and ambulation/stair navigation   [] UE / Cervical: cervical, postural, scapular, scapulothoracic and UE control with self care, reaching, carrying, lifting, house/yardwork, driving, computer work  [] (76325)Reviewed/Progressed HEP activities related to improving balance, coordination, kinesthetic sense, posture, motor skill, proprioception of   [] LE: core, proximal hip and LE for self care, mobility, lifting, and ambulation/stair navigation    [] UE / Cervical: cervical, postural,  scapular, scapulothoracic and UE control with self care, reaching, carrying, lifting, house/yardwork, driving, computer work    Manual Treatments:  PROM / STM / Oscillations-Mobs:  G-I, II, III, IV (PA's, Inf., Post.)  [] (10096) Provided manual therapy to mobilize LE, proximal hip and/or LS spine soft tissue/joints for the purpose of modulating pain, promoting relaxation,  increasing ROM, reducing/eliminating soft tissue swelling/inflammation/restriction, improving soft tissue extensibility and allowing for proper ROM for normal function with   [] LE / lumbar: self care, mobility, lifting and ambulation. [] UE / Cervical: self care, reaching, carrying, lifting, house/yardwork, driving, computer work. Modalities:  [] (32172) Vasopneumatic compression: Utilized vasopneumatic compression to decrease edema / swelling for the purpose of improving mobility and quad tone / recruitment which will allow for increased overall function including but not limited to self-care, transfers, ambulation, and ascending / descending stairs.        Modalities:      Charges:  Timed Code Treatment Minutes: 38   Total Treatment Minutes: 38     [] EVAL - LOW (20611)   [] EVAL - MOD (01007)  [] EVAL - HIGH (22941)  [] RE-EVAL (76970)  [x] YC(63901) x 2      [] Ionto  [] NMR (83661) x       [] Vaso  [x] Manual (05477) x 1      [] Ultrasound  [] TA x        [] Mech Traction (93672)  [] Aquatic Therapy x      [] ES (un) (09800):   [] Home Management Training x  [] ES(attended) (11942)   [] Dry Needling 1-2 muscles (13413):  [] Dry Needling 3+ muscles (866415)  [] Group:      [] Other:     GOALS:   Patient stated goal: decreased pain during functional mobility  [] Progressing: [] Met: [] Not Met: [] Adjusted    Therapist goals for Patient:   Short Term Goals: To be achieved in: 2 weeks  1. Independent in HEP and progression per patient tolerance, in order to prevent re-injury. [] Progressing: [] Met: [] Not Met: [] Adjusted  2. Patient will have a decrease in pain to facilitate improvement in movement, function, and ADLs as indicated by Functional Deficits. [] Progressing: [] Met: [] Not Met: [] Adjusted    Long Term Goals: To be achieved in: 8 weeks  1. Disability index score of 30% or less for the ALENA to assist with reaching prior level of function. [] Progressing: [] Met: [] Not Met: [] Adjusted  2. Patient will demonstrate increased AROM to WNL, good LS mobility, good hip ROM to allow for proper joint functioning as indicated by patients Functional Deficits. [] Progressing: [] Met: [] Not Met: [] Adjusted  3. Patient will demonstrate an increase in Strength to good proximal hip and core activation to allow for proper functional mobility as indicated by patients Functional Deficits. [] Progressing: [] Met: [] Not Met: [] Adjusted  4. Patient will return to functional activities including sitting, standing, walking without increased symptoms or restriction. [] Progressing: [] Met: [] Not Met: [] Adjusted    Overall Progression Towards Functional goals/ Treatment Progress Update:  [] Patient is progressing as expected towards functional goals listed. [] Progression is slowed due to complexities/Impairments listed. [] Progression has been slowed due to co-morbidities.   [x] Plan just implemented, too soon to assess goals progression <30days   [] Goals require adjustment due to lack of progress  [] Patient is not progressing as expected and requires additional follow up with physician  [] Other    Persisting Functional Limitations/Impairments:  [x]Sleeping [x]Sitting               [x]Standing []Transfers        [x]Walking []Kneeling               []Stairs []Squatting / bending   []ADLs []Reaching  []Lifting  []Housework  []Driving []Job related tasks  []Sports/Recreation []Other:        ASSESSMENT:  Pt demonstrating improved engagement of TrA during PPT exercise and requires less cueing at this visit. Pt with two trigger points in R lumbar paraspinals with low tolerance to increased pressure during manual therapy. Pt educated she can purchase biofreeze OTC and continue to use pain patches PRN. Progress tolerance to exercise and manual therapy at [facility]. Treatment/Activity Tolerance:  [x] Patient able to complete tx  [] Patient limited by fatigue  [] Patient limited by pain  [] Patient limited by other medical complications  [] Other:     Prognosis: [x] Good [] Fair  [] Poor    Patient Requires Follow-up: [x] Yes  [] No    Plan for next treatment session:    PLAN: See eval. PT 1-2x / week for 8 weeks. [x] Continue per plan of care [] Alter current plan (see comments)  [] Plan of care initiated [] Hold pending MD visit [] Discharge    Electronically signed by: Gadiel Pompa PT, DPT    Note: If patient does not return for scheduled/ recommended follow up visits, this note will serve as a discharge from care along with most recent update on progress.

## 2020-07-06 ENCOUNTER — APPOINTMENT (OUTPATIENT)
Dept: GENERAL RADIOLOGY | Age: 55
End: 2020-07-06
Payer: COMMERCIAL

## 2020-07-06 ENCOUNTER — HOSPITAL ENCOUNTER (EMERGENCY)
Age: 55
Discharge: HOME | End: 2020-07-06
Payer: COMMERCIAL

## 2020-07-06 VITALS
WEIGHT: 150 LBS | TEMPERATURE: 97.9 F | SYSTOLIC BLOOD PRESSURE: 167 MMHG | HEART RATE: 80 BPM | HEIGHT: 60 IN | BODY MASS INDEX: 29.45 KG/M2 | DIASTOLIC BLOOD PRESSURE: 91 MMHG | RESPIRATION RATE: 14 BRPM | OXYGEN SATURATION: 97 %

## 2020-07-06 PROCEDURE — 73140 X-RAY EXAM OF FINGER(S): CPT

## 2020-07-06 PROCEDURE — 12001 RPR S/N/AX/GEN/TRNK 2.5CM/<: CPT

## 2020-07-06 PROCEDURE — 90715 TDAP VACCINE 7 YRS/> IM: CPT | Performed by: PHYSICIAN ASSISTANT

## 2020-07-06 PROCEDURE — 99282 EMERGENCY DEPT VISIT SF MDM: CPT

## 2020-07-06 PROCEDURE — 6360000002 HC RX W HCPCS: Performed by: PHYSICIAN ASSISTANT

## 2020-07-06 PROCEDURE — 90471 IMMUNIZATION ADMIN: CPT | Performed by: PHYSICIAN ASSISTANT

## 2020-07-06 RX ORDER — LIDOCAINE HYDROCHLORIDE 10 MG/ML
INJECTION, SOLUTION EPIDURAL; INFILTRATION; INTRACAUDAL; PERINEURAL
Status: DISCONTINUED
Start: 2020-07-06 | End: 2020-07-06 | Stop reason: HOSPADM

## 2020-07-06 RX ADMIN — TETANUS TOXOID, REDUCED DIPHTHERIA TOXOID AND ACELLULAR PERTUSSIS VACCINE, ADSORBED 0.5 ML: 5; 2.5; 8; 8; 2.5 SUSPENSION INTRAMUSCULAR at 17:55

## 2020-07-06 ASSESSMENT — ENCOUNTER SYMPTOMS
SHORTNESS OF BREATH: 0
BACK PAIN: 0
ABDOMINAL PAIN: 0
RESPIRATORY NEGATIVE: 1
DIARRHEA: 0
COLOR CHANGE: 0
VOMITING: 0
COUGH: 0
NAUSEA: 0
CONSTIPATION: 0

## 2020-07-06 NOTE — ED PROVIDER NOTES
[address] [facility]        Pt Name: Ty Mooney  MRN: [id number]  [place] 1965  Date of evaluation: 7/6/2020  Provider: UNA Morgan  PCP: HOWARD Mae CNP    Evaluation by CARRIE. My supervising physician was available for consultation. CHIEF COMPLAINT       Chief Complaint   Patient presents with    Laceration     pt c/o L to L thumb today opening a car door. Denies any blood thinner usage. Bleeding controlled during triage. HISTORY OF PRESENT ILLNESS   (Location, Timing/Onset, Context/Setting, Quality, Duration, Modifying Factors, Severity, Associated Signs and Symptoms)  Note limiting factors. Ty Mooney is a 49 y.o. female with no significant past medical history who presents to the ED with complaint of a left thumb injury. Patient states she slammed her left thumb in a car door. States laceration to the pad. Denies any damage to the nail. Patient denies blood thinning medication usage. States aching pain rated 7/10 worse with palpation. States this edema. Denies ecchymosis, erythema or warmth. Denies bleeding or drainage at this time. Denies fever chills. Denies numbness or tingling. Denies decreased range of motion or strength. Denies any other injury or trauma throughout. Denies previous injury or trauma to this area in the past.  Patient states she is right-hand dominant. Unsure of last tetanus vaccine. Denies taking any over-the-counter medication for symptom control. Became concerned and came to the ED for further evaluation and treatment. Nursing Notes were all reviewed and agreed with or any disagreements were addressed in the HPI. REVIEW OF SYSTEMS    (2-9 systems for level 4, 10 or more for level 5)     Review of Systems   Constitutional: Negative for activity change, appetite change, chills and fever. Respiratory: Negative. Negative for cough and shortness of breath. exploration: wound explored through full range of motion and entire depth of wound probed and visualized      Wound extent: no foreign bodies/material noted, no muscle damage noted, no nerve damage noted, no tendon damage noted, no underlying fracture noted and no vascular damage noted      Contaminated: no    Treatment:     Area cleansed with:  Hibiclens and saline    Amount of cleaning:  Extensive    Irrigation solution:  Sterile saline    Irrigation method:  Pressure wash    Visualized foreign bodies/material removed: no    Skin repair:     Repair method:  Sutures    Suture size:  4-0    Suture material:  Nylon    Suture technique:  Simple interrupted    Number of sutures:  4  Approximation:     Approximation:  Close  Post-procedure details:     Dressing:  Open (no dressing)    Patient tolerance of procedure: Tolerated well, no immediate complications        CRITICAL CARE TIME   N/A    CONSULTS:  None      EMERGENCY DEPARTMENT COURSE and DIFFERENTIAL DIAGNOSIS/MDM:   Vitals:    Vitals:    07/06/20 1657   BP: (!) 167/91   Pulse: 80   Resp: 14   Temp: 97.9 °F (36.6 °C)   SpO2: 97%   Weight: 150 lb (68 kg)   Height: 5' (1.524 m)       Patient was given the following medications:  Medications   lidocaine PF 1 % injection (has no administration in time range)   Tetanus-Diphth-Acell Pertussis (BOOSTRIX) injection 0.5 mL (0.5 mLs Intramuscular Given 7/6/20 1755)           Patient is a 72-year-old female who presents the ED with complaint of a left thumb injury. Has left thumb injury from slamming her thumb in the car door. Is right-hand dominant. Tetanus updated given unknown status. X-ray obtained and showed no obvious soft tissue injury with no osseous abnormality. Wound was anesthetized by myself. Patient tolerated the procedure well. Wound repaired here in the emergency department. Instructed on proper wound care. Sutures removed in 12 to 14 days. Follow-up with PCP.   Return to ED for new or worsening symptoms. Low suspicion for open fracture, dislocation, septic arthritis, gout, cellulitis, abscess, DVT, arterial injury, tendon involvement, nerve involvement, vascular compromise, compartment syndrome or other emergent etiology at this time. FINAL IMPRESSION      1. Thumb laceration, left, initial encounter    2.  Tetanus toxoid vaccination administered at current visit          DISPOSITION/PLAN   DISPOSITION Decision To Discharge 07/06/2020 07:29:15 PM      PATIENT REFERREDTO:  Luis Carlos López, APRN - CNP  [address] [facility]  [phone]    Schedule an appointment as soon as possible for a visit   For suture removal 12-14 days      DISCHARGE MEDICATIONS:  Discharge Medication List as of 7/6/2020  7:35 PM          DISCONTINUED MEDICATIONS:  Discharge Medication List as of 7/6/2020  7:35 PM                 (Please note that portions of this note were completed with a voice recognition program.  Efforts were made to edit the dictations but occasionally words are mis-transcribed.)    UNA Morgan (electronically signed)          UNA Torres  07/06/20 9245

## 2020-07-06 NOTE — ED NOTES
Bed: [facility]-02  Expected date:   Expected time:   Means of arrival:   Comments:  jennifer when clean     Kenya Cruz RN  07/06/20 2701

## 2020-07-07 ENCOUNTER — HOSPITAL ENCOUNTER (OUTPATIENT)
Dept: PHYSICAL THERAPY | Age: 55
Setting detail: THERAPIES SERIES
Discharge: HOME | End: 2020-07-07
Payer: COMMERCIAL

## 2020-07-07 PROCEDURE — 97110 THERAPEUTIC EXERCISES: CPT

## 2020-07-07 PROCEDURE — 97140 MANUAL THERAPY 1/> REGIONS: CPT

## 2020-07-07 NOTE — FLOWSHEET NOTE
[facility]  Phone: [phone]   Fax: [phone]    Physical Therapy Daily Treatment Note  Date:  2020    Patient Name:  Tray Sears    :  1965  MRN: [id number]  Medical/Treatment Diagnosis Information:  Diagnosis: M54.5 (ICD-10-CM) - Low back pain  Treatment Diagnosis: decreased lumbar ROM, poor core activation, TTP R lumbar paraspinals, R QL, and R glute at iliac insertion, dec hip strength  Insurance/Certification information:  PT Insurance Information: [place] 30 visits/yr  Physician Information:  Referring Practitioner: HOWARD Caldwell CNP  Plan of care signed (Y/N): []  Yes [x]  No Sent     Date of Patient follow up with Physician:      Progress Report: []  Yes  [x]  No     Date Range for reporting period:  Beginnin2020  Ending:     Progress report due (10 Rx/or 30 days whichever is less): visit #10 or      Recertification due (POC duration/ or 90 days whichever is less):  2020     Visit # Insurance Allowable Auth required? Date Range   3 30/yr []  Yes  [x]  No Through      Latex Allergy:  [x]NO      []YES  Preferred Language for Healthcare:   []English       [x]other: Turkmen    Functional Scale:        Date assessed:  Oswestry: raw score = 31/50; dysfunction = 62%             20    Pain level:  6/10     SUBJECTIVE:   Felt okay following last session, pain is located on R flank and R lateral hip. Feels the STM helped during last session. Exercises are going well at home, is performing twice daily.        OBJECTIVE:   : pt 15 min late to appt      RESTRICTIONS/PRECAUTIONS: none    Exercises/Interventions:   Therapeutic Exercises (69792) Resistance / level Sets/sec Reps Notes   Sci-fit   Bike #1 1.0  1.0   5 mins  -all in use this date   Swiss ball (red):  · Pelvic tilts: A/P, M/L, CW & CCW circles  · Alt UE flex  · March     10 ea    10  10    Supine:  · PPT  · LTR  · Glute set  · March Prone:  · Hip ext  · Knee flex     10 B  15 B                                       Therapeutic Activities (43157)              Education provided: pathomechanics of injury, expectations for therapy, HEP review    Pt with good understanding of all education provided                        Neuromuscular Re-ed (24684)              SB \"squeezes\"/crunch; B UE/LE's                                 Manual Intervention (41155)  blue massage tool 7/7 for STM       L S/L: STM R IT-Band mid to TFL, hip abductors, R proximal rectus femoris and ASIS  8 mins  incr sensitivity at R greater trochanter so limited STM   STM R lumbar paraspinals, R QL, R proximal to lateral glute max  12 mins  TP noted glute max & QL   Prone R quad stretch  2x20\"                              Pt. Education:      Home Exercise Program:  Access Code: [email or web address]   URL: [email or web address]   Date: 06/23/2020   Prepared by: Ngozi Calix Huml     Exercises   Supine Posterior Pelvic Tilt - 10 reps - 2 sets - 5 hold - 1x daily - 7x weekly   Supine Lower Trunk Rotation - 10 reps - 2 sets - 1x daily - 7x weekly   Hooklying Gluteal Sets - 10 reps - 2 sets - 1x daily - 7x weekly   Supine March - 10 reps - 2 sets - 1x daily - 7x weekly         Therapeutic Exercise and NMR EXR  [x] (01963) Provided verbal/tactile cueing for activities related to strengthening, flexibility, endurance, ROM for improvements in  [x] LE / Lumbar: LE, proximal hip, and core control with self care, mobility, lifting, ambulation.   [] UE / Cervical: cervical, postural, scapular, scapulothoracic and UE control with self care, reaching, carrying, lifting, house/yardwork, driving, computer work.  [] (81587) Provided verbal/tactile cueing for activities related to improving balance, coordination, kinesthetic sense, posture, motor skill, proprioception to assist with   [] LE / lumbar: LE, proximal hip, and core control in self care, mobility, lifting, ambulation and eccentric single leg control. [] UE / cervical: cervical, scapular, scapulothoracic and UE control with self care, reaching, carrying, lifting, house/yardwork, driving, computer work.   [] (60773) Therapist is in constant attendance of 2 or more patients providing skilled therapy interventions, but not providing any significant amount of measurable one-on-one time to either patient, for improvements in  [] LE / lumbar: LE, proximal hip, and core control in self care, mobility, lifting, ambulation and eccentric single leg control. [] UE / cervical: cervical, scapular, scapulothoracic and UE control with self care, reaching, carrying, lifting, house/yardwork, driving, computer work. NMR and Therapeutic Activities:    [x] (11137 or 37181) Provided verbal/tactile cueing for activities related to improving balance, coordination, kinesthetic sense, posture, motor skill, proprioception and motor activation to allow for proper function of   [x] LE: / Lumbar core, proximal hip and LE with self care and ADLs  [] UE / Cervical: cervical, postural, scapular, scapulothoracic and UE control with self care, carrying, lifting, driving, computer work.   [] (71253) Gait Re-education- Provided training and instruction to the patient for proper LE, core and proximal hip recruitment and positioning and eccentric body weight control with ambulation re-education including up and down stairs     Home Management Training / Self Care:  [] (51409) Provided self-care/home management training related to activities of daily living and compensatory training, and/or use of adaptive equipment for improvement with: ADLs and compensatory training, meal preparation, safety procedures and instruction in use of adaptive equipment, including bathing, grooming, dressing, personal hygiene, basic household cleaning and chores.      Home Exercise Program:    [x] (91398) Reviewed/Progressed HEP activities related to strengthening, flexibility, endurance, ROM of   [x] LE / Lumbar: core, proximal hip and LE for functional self-care, mobility, lifting and ambulation/stair navigation   [] UE / Cervical: cervical, postural, scapular, scapulothoracic and UE control with self care, reaching, carrying, lifting, house/yardwork, driving, computer work  [] (64082)Reviewed/Progressed HEP activities related to improving balance, coordination, kinesthetic sense, posture, motor skill, proprioception of   [] LE: core, proximal hip and LE for self care, mobility, lifting, and ambulation/stair navigation    [] UE / Cervical: cervical, postural,  scapular, scapulothoracic and UE control with self care, reaching, carrying, lifting, house/yardwork, driving, computer work    Manual Treatments:  PROM / STM / Oscillations-Mobs:  G-I, II, III, IV (PA's, Inf., Post.)  [x] (10910) Provided manual therapy to mobilize LE, proximal hip and/or LS spine soft tissue/joints for the purpose of modulating pain, promoting relaxation,  increasing ROM, reducing/eliminating soft tissue swelling/inflammation/restriction, improving soft tissue extensibility and allowing for proper ROM for normal function with   [x] LE / lumbar: self care, mobility, lifting and ambulation. [] UE / Cervical: self care, reaching, carrying, lifting, house/yardwork, driving, computer work. Modalities:  [] (75007) Vasopneumatic compression: Utilized vasopneumatic compression to decrease edema / swelling for the purpose of improving mobility and quad tone / recruitment which will allow for increased overall function including but not limited to self-care, transfers, ambulation, and ascending / descending stairs.        Modalities:   7/7:  Prone CP from R ASIS to L QL x15 mins    Charges:  Timed Code Treatment Minutes: 43   Total Treatment Minutes: 58     [] EVAL - LOW (86570)   [] EVAL - MOD (37274)  [] EVAL - HIGH (47415)  [] RE-EVAL (93465)  [x] PQ(94183) x 1      [] Ionto  [] NMR (96379) x       [] Vaso  [x] Manual (92803) x 2      [] Ultrasound  [] TA x        [] [facility] Traction (49008)  [] Aquatic Therapy x      [] ES (un) (69012):   [] Home Management Training x  [] ES(attended) (18012)   [] Dry Needling 1-2 muscles (45710):  [] Dry Needling 3+ muscles (031633)  [] Group:      [] Other:     GOALS:   Patient stated goal: decreased pain during functional mobility  [] Progressing: [] Met: [] Not Met: [] Adjusted    Therapist goals for Patient:   Short Term Goals: To be achieved in: 2 weeks  1. Independent in HEP and progression per patient tolerance, in order to prevent re-injury. [] Progressing: [] Met: [] Not Met: [] Adjusted  2. Patient will have a decrease in pain to facilitate improvement in movement, function, and ADLs as indicated by Functional Deficits. [] Progressing: [] Met: [] Not Met: [] Adjusted    Long Term Goals: To be achieved in: 8 weeks  1. Disability index score of 30% or less for the ALENA to assist with reaching prior level of function. [] Progressing: [] Met: [] Not Met: [] Adjusted  2. Patient will demonstrate increased AROM to WNL, good LS mobility, good hip ROM to allow for proper joint functioning as indicated by patients Functional Deficits. [] Progressing: [] Met: [] Not Met: [] Adjusted  3. Patient will demonstrate an increase in Strength to good proximal hip and core activation to allow for proper functional mobility as indicated by patients Functional Deficits. [] Progressing: [] Met: [] Not Met: [] Adjusted  4. Patient will return to functional activities including sitting, standing, walking without increased symptoms or restriction. [] Progressing: [] Met: [] Not Met: [] Adjusted    Overall Progression Towards Functional goals/ Treatment Progress Update:  [] Patient is progressing as expected towards functional goals listed. [] Progression is slowed due to complexities/Impairments listed. [] Progression has been slowed due to co-morbidities.   [x] Plan just implemented, too soon to assess goals progression <30days   [] Goals require adjustment due to lack of progress  [] Patient is not progressing as expected and requires additional follow up with physician  [] Other    Persisting Functional Limitations/Impairments:  [x]Sleeping [x]Sitting               [x]Standing []Transfers        [x]Walking []Kneeling               []Stairs []Squatting / bending   []ADLs []Reaching  []Lifting  []Housework  []Driving []Job related tasks  []Sports/Recreation []Other:        ASSESSMENT:   Pt required moderate cues for improved pelvic tilts on swiss ball but tolerated well. Pt identified several sensitive locations during STM, R ASIS, R glute max, R QL, R hip abductors, each responded well to STM and sensitivity reduced. Pt did not report improvement in STM to R greater trochanter so limited STM to that location. Treatment/Activity Tolerance:  [x] Patient able to complete tx  [] Patient limited by fatigue  [] Patient limited by pain  [] Patient limited by other medical complications  [] Other:     Prognosis: [x] Good [] Fair  [] Poor    Patient Requires Follow-up: [x] Yes  [] No    Plan for next treatment session:    PLAN: See eval. PT 1-2x / week for 8 weeks. [x] Continue per plan of care [] Alter current plan (see comments)  [] Plan of care initiated [] Hold pending MD visit [] Discharge    Electronically signed by: Samantha Barrett PT, DPT    Note: If patient does not return for scheduled/ recommended follow up visits, this note will serve as a discharge from care along with most recent update on progress.

## 2020-07-10 ENCOUNTER — HOSPITAL ENCOUNTER (OUTPATIENT)
Dept: PHYSICAL THERAPY | Age: 55
Setting detail: THERAPIES SERIES
Discharge: HOME | End: 2020-07-10
Payer: COMMERCIAL

## 2020-07-10 PROCEDURE — 97140 MANUAL THERAPY 1/> REGIONS: CPT

## 2020-07-10 PROCEDURE — 97110 THERAPEUTIC EXERCISES: CPT

## 2020-07-10 NOTE — FLOWSHEET NOTE
set  · March  · Bridge  · SLR  3\" hold    3\" hold  10  10  10  10  10  10 B    -cues to reduce ROM to R to improve pain    -limited AROM  -decreased AROM R   Prone:  · Hip ext  · Knee flex         S/L: clamshells   10 B                                Therapeutic Activities (31098)              Education provided: pathomechanics of injury, expectations for therapy, HEP review    Pt with good understanding of all education provided                        Neuromuscular Re-ed (42496)              SB \"squeezes\"/crunch; B UE/LE's                                 Manual Intervention (82344)  blue massage tool 7/10 for STM       L S/L: STM R TFL & proximal IT-Band, R proximal rectus femoris and ASIS  10 mins  incr sensitivity at R TFL   STM R lumbar paraspinals, R piriformis, R glute max  5 mins     Prone R quad stretch  Piriformis stretch    3x20\" R    -pt able to perform 1 time with instruction                            Pt. Education:      Home Exercise Program:  Access Code: [email or web address]   URL: [email or web address]   Date: 06/23/2020   Prepared by: Tyrese Chowdhury Huml     Exercises   Supine Posterior Pelvic Tilt - 10 reps - 2 sets - 5 hold - 1x daily - 7x weekly   Supine Lower Trunk Rotation - 10 reps - 2 sets - 1x daily - 7x weekly   Hooklying Gluteal Sets - 10 reps - 2 sets - 1x daily - 7x weekly   Supine March - 10 reps - 2 sets - 1x daily - 7x weekly         Therapeutic Exercise and NMR EXR  [x] (64149) Provided verbal/tactile cueing for activities related to strengthening, flexibility, endurance, ROM for improvements in  [x] LE / Lumbar: LE, proximal hip, and core control with self care, mobility, lifting, ambulation.   [] UE / Cervical: cervical, postural, scapular, scapulothoracic and UE control with self care, reaching, carrying, lifting, house/yardwork, driving, computer work.  [] (16943) Provided verbal/tactile cueing for activities related to improving balance, coordination, kinesthetic sense, posture, motor skill, chores. Home Exercise Program:    [x] (79981) Reviewed/Progressed HEP activities related to strengthening, flexibility, endurance, ROM of   [x] LE / Lumbar: core, proximal hip and LE for functional self-care, mobility, lifting and ambulation/stair navigation   [] UE / Cervical: cervical, postural, scapular, scapulothoracic and UE control with self care, reaching, carrying, lifting, house/yardwork, driving, computer work  [] (41456)Reviewed/Progressed HEP activities related to improving balance, coordination, kinesthetic sense, posture, motor skill, proprioception of   [] LE: core, proximal hip and LE for self care, mobility, lifting, and ambulation/stair navigation    [] UE / Cervical: cervical, postural,  scapular, scapulothoracic and UE control with self care, reaching, carrying, lifting, house/yardwork, driving, computer work    Manual Treatments:  PROM / STM / Oscillations-Mobs:  G-I, II, III, IV (PA's, Inf., Post.)  [x] (04775) Provided manual therapy to mobilize LE, proximal hip and/or LS spine soft tissue/joints for the purpose of modulating pain, promoting relaxation,  increasing ROM, reducing/eliminating soft tissue swelling/inflammation/restriction, improving soft tissue extensibility and allowing for proper ROM for normal function with   [x] LE / lumbar: self care, mobility, lifting and ambulation. [] UE / Cervical: self care, reaching, carrying, lifting, house/yardwork, driving, computer work. Modalities:  [] (18250) Vasopneumatic compression: Utilized vasopneumatic compression to decrease edema / swelling for the purpose of improving mobility and quad tone / recruitment which will allow for increased overall function including but not limited to self-care, transfers, ambulation, and ascending / descending stairs.        Modalities:    7/10:  Declined CP;  Did ask about BioFreeze and where to obtain, plans to purchase and use on R TFL  7/7:  Prone CP from R ASIS to L QL x15 mins    Charges:  Timed Code Treatment Minutes: 49   Total Treatment Minutes: 49     [] EVAL - LOW (98341)   [] EVAL - MOD (88541)  [] EVAL - HIGH (35373)  [] RE-EVAL (93838)  [x] BH(15411) x 2      [] Ionto  [] NMR (12739) x       [] Vaso  [x] Manual (23140) x 1      [] Ultrasound  [] TA x        [] Mech Traction (12470)  [] Aquatic Therapy x      [] ES (un) (33904):   [] Home Management Training x  [] ES(attended) (61901)   [] Dry Needling 1-2 muscles (29196):  [] Dry Needling 3+ muscles (703743)  [] Group:      [] Other:     GOALS:   Patient stated goal: decreased pain during functional mobility  [] Progressing: [] Met: [] Not Met: [] Adjusted    Therapist goals for Patient:   Short Term Goals: To be achieved in: 2 weeks  1. Independent in HEP and progression per patient tolerance, in order to prevent re-injury. [] Progressing: [] Met: [] Not Met: [] Adjusted  2. Patient will have a decrease in pain to facilitate improvement in movement, function, and ADLs as indicated by Functional Deficits. [] Progressing: [] Met: [] Not Met: [] Adjusted    Long Term Goals: To be achieved in: 8 weeks  1. Disability index score of 30% or less for the ALENA to assist with reaching prior level of function. [] Progressing: [] Met: [] Not Met: [] Adjusted  2. Patient will demonstrate increased AROM to WNL, good LS mobility, good hip ROM to allow for proper joint functioning as indicated by patients Functional Deficits. [] Progressing: [] Met: [] Not Met: [] Adjusted  3. Patient will demonstrate an increase in Strength to good proximal hip and core activation to allow for proper functional mobility as indicated by patients Functional Deficits. [] Progressing: [] Met: [] Not Met: [] Adjusted  4. Patient will return to functional activities including sitting, standing, walking without increased symptoms or restriction.    [] Progressing: [] Met: [] Not Met: [] Adjusted    Overall Progression Towards Functional goals/ Treatment Progress Update:  [] Patient is progressing as expected towards functional goals listed. [] Progression is slowed due to complexities/Impairments listed. [] Progression has been slowed due to co-morbidities. [x] Plan just implemented, too soon to assess goals progression <30days   [] Goals require adjustment due to lack of progress  [] Patient is not progressing as expected and requires additional follow up with physician  [] Other    Persisting Functional Limitations/Impairments:  [x]Sleeping [x]Sitting               [x]Standing []Transfers        [x]Walking []Kneeling               []Stairs []Squatting / bending   []ADLs []Reaching  []Lifting  []Housework  []Driving []Job related tasks  []Sports/Recreation []Other:        ASSESSMENT:    STM to R TFL improved pain most this date, Lumbar TR to R increased pain, so advised pt to reduce AROM to R during HEP to assist in pain relief, pt plans to. Limited tolerance to treatment this date but STM improved pain well. Treatment/Activity Tolerance:  [x] Patient able to complete tx  [] Patient limited by fatigue  [x] Patient limited by pain  [] Patient limited by other medical complications  [] Other:     Prognosis: [x] Good [] Fair  [] Poor    Patient Requires Follow-up: [x] Yes  [] No    Plan for next treatment session:    PLAN: See eval. PT 1-2x / week for 8 weeks. [x] Continue per plan of care [] Alter current plan (see comments)  [] Plan of care initiated [] Hold pending MD visit [] Discharge    Electronically signed by: Jacob Hays PT, DPT    Note: If patient does not return for scheduled/ recommended follow up visits, this note will serve as a discharge from care along with most recent update on progress.

## 2020-07-13 ENCOUNTER — HOSPITAL ENCOUNTER (OUTPATIENT)
Dept: PHYSICAL THERAPY | Age: 55
Setting detail: THERAPIES SERIES
Discharge: HOME | End: 2020-07-13
Payer: COMMERCIAL

## 2020-07-13 PROCEDURE — 97140 MANUAL THERAPY 1/> REGIONS: CPT

## 2020-07-13 PROCEDURE — 97110 THERAPEUTIC EXERCISES: CPT

## 2020-07-13 NOTE — FLOWSHEET NOTE
[address] [facility] Physical Therapy  Phone: [phone]   Fax: [phone]    Physical Therapy Daily Treatment Note  Date:  2020    Patient Name:  Valery Mendoza    :  1965  MRN: [id number]  Medical/Treatment Diagnosis Information:  Diagnosis: M54.5 (ICD-10-CM) - Low back pain  Treatment Diagnosis: decreased lumbar ROM, poor core activation, TTP R lumbar paraspinals, R QL, and R glute at iliac insertion, dec hip strength  Insurance/Certification information:  PT Insurance Information: [place] 30 visits/yr  Physician Information:  Referring Practitioner: HOWARD Winkler CNP  Plan of care signed (Y/N): []  Yes [x]  No Sent     Date of Patient follow up with Physician:      Progress Report: []  Yes  [x]  No     Date Range for reporting period:  Beginnin2020  Ending:     Progress report due (10 Rx/or 30 days whichever is less): visit #10 or      Recertification due (POC duration/ or 90 days whichever is less):  2020     Visit # Insurance Allowable Auth required? Date Range   4 30/yr []  Yes  [x]  No Through      Latex Allergy:  [x]NO      []YES  Preferred Language for Healthcare:   []English       [x]other: Mongolian    Functional Scale:        Date assessed:  Oswestry: raw score = 31/50; dysfunction = 62%             20    Pain level:  6/10     SUBJECTIVE: Pt declined use of  phone. Pt reports pain is lower today. Pt states she is doing her HEP 2x/day.      OBJECTIVE:   : pt 15 min late to appt      RESTRICTIONS/PRECAUTIONS: none    Exercises/Interventions:   Therapeutic Exercises (27287) Resistance / level Sets/sec Reps Notes   Sci-fit   Bike #1 1.0  2.5   5 mins  -all in use this date   IB / HR  2x30\" 2x10    Seated piriformis stretch  X 30 sec B     Swiss ball (red):  · Pelvic tilts: A/P, M/L, CW & CCW circles  · Alt UE flex  · March     10 ea    10  10    Supine:  · PPT  · LTR  · Glute set  · March  · Bridge  · SLR      -cues to reduce ROM to R to improve pain    -limited AROM  -decreased AROM R   Prone:  · Hip ext  · Knee flex         S/L: clamshells       Standing:  · Hip ext  · Hip Abd   · Mini squats       10  10  15 7/13: done following manual       Moderate cueing for form                         Therapeutic Activities (60154)              Education provided: pathomechanics of injury, expectations for therapy, HEP review    Pt with good understanding of all education provided                        Neuromuscular Re-ed (17990)              SB \"squeezes\"/crunch; B UE/LE's                                 Manual Intervention (44121)  blue massage tool 7/10 for STM (smooth side used for broader area)       L S/L: STM R TFL & proximal IT-Band,   10 mins  incr sensitivity at R TFL   Prone: STM R lumbar paraspinals, R piriformis, R glute max  3 mins     Prone R quad stretch  Piriformis stretch  Prone R hip flexor stretch      3x20 sec R    -pt able to perform 1 time with instruction                            Pt. Education:      Home Exercise Program:  Access Code: [email or web address]   URL: [email or web address]   Date: 06/23/2020   Prepared by: Brigitte Harris Hummagaly     Exercises   Supine Posterior Pelvic Tilt - 10 reps - 2 sets - 5 hold - 1x daily - 7x weekly   Supine Lower Trunk Rotation - 10 reps - 2 sets - 1x daily - 7x weekly   Hooklying Gluteal Sets - 10 reps - 2 sets - 1x daily - 7x weekly   Supine March - 10 reps - 2 sets - 1x daily - 7x weekly     Therapeutic Exercise and NMR EXR  [x] (63328) Provided verbal/tactile cueing for activities related to strengthening, flexibility, endurance, ROM for improvements in  [x] LE / Lumbar: LE, proximal hip, and core control with self care, mobility, lifting, ambulation.   [] UE / Cervical: cervical, postural, scapular, scapulothoracic and UE control with self care, reaching, carrying, lifting, house/yardwork, driving, computer work.  [] (49614) Provided verbal/tactile cueing for activities related to improving balance, coordination, kinesthetic sense, posture, motor skill, proprioception to assist with   [] LE / lumbar: LE, proximal hip, and core control in self care, mobility, lifting, ambulation and eccentric single leg control. [] UE / cervical: cervical, scapular, scapulothoracic and UE control with self care, reaching, carrying, lifting, house/yardwork, driving, computer work.   [] (87184) Therapist is in constant attendance of 2 or more patients providing skilled therapy interventions, but not providing any significant amount of measurable one-on-one time to either patient, for improvements in  [] LE / lumbar: LE, proximal hip, and core control in self care, mobility, lifting, ambulation and eccentric single leg control. [] UE / cervical: cervical, scapular, scapulothoracic and UE control with self care, reaching, carrying, lifting, house/yardwork, driving, computer work.      NMR and Therapeutic Activities:    [] (40112 or 52507) Provided verbal/tactile cueing for activities related to improving balance, coordination, kinesthetic sense, posture, motor skill, proprioception and motor activation to allow for proper function of   [] LE: / Lumbar core, proximal hip and LE with self care and ADLs  [] UE / Cervical: cervical, postural, scapular, scapulothoracic and UE control with self care, carrying, lifting, driving, computer work.   [] (54548) Gait Re-education- Provided training and instruction to the patient for proper LE, core and proximal hip recruitment and positioning and eccentric body weight control with ambulation re-education including up and down stairs     Home Management Training / Self Care:  [] (86513) Provided self-care/home management training related to activities of daily living and compensatory training, and/or use of adaptive equipment for improvement with: ADLs and compensatory training, meal preparation, safety procedures and instruction in use of adaptive equipment, including bathing, grooming, dressing, personal hygiene, basic household cleaning and chores. Home Exercise Program:    [x] (64088) Reviewed/Progressed HEP activities related to strengthening, flexibility, endurance, ROM of   [x] LE / Lumbar: core, proximal hip and LE for functional self-care, mobility, lifting and ambulation/stair navigation   [] UE / Cervical: cervical, postural, scapular, scapulothoracic and UE control with self care, reaching, carrying, lifting, house/yardwork, driving, computer work  [] (77247)Reviewed/Progressed HEP activities related to improving balance, coordination, kinesthetic sense, posture, motor skill, proprioception of   [] LE: core, proximal hip and LE for self care, mobility, lifting, and ambulation/stair navigation    [] UE / Cervical: cervical, postural,  scapular, scapulothoracic and UE control with self care, reaching, carrying, lifting, house/yardwork, driving, computer work    Manual Treatments:  PROM / STM / Oscillations-Mobs:  G-I, II, III, IV (PA's, Inf., Post.)  [x] (07432) Provided manual therapy to mobilize LE, proximal hip and/or LS spine soft tissue/joints for the purpose of modulating pain, promoting relaxation,  increasing ROM, reducing/eliminating soft tissue swelling/inflammation/restriction, improving soft tissue extensibility and allowing for proper ROM for normal function with   [x] LE / lumbar: self care, mobility, lifting and ambulation. [] UE / Cervical: self care, reaching, carrying, lifting, house/yardwork, driving, computer work. Modalities:  [] (76700) Vasopneumatic compression: Utilized vasopneumatic compression to decrease edema / swelling for the purpose of improving mobility and quad tone / recruitment which will allow for increased overall function including but not limited to self-care, transfers, ambulation, and ascending / descending stairs.        Modalities:      7/13: MHP to R low back/glute x 15 min in hooklying  7/10:  Declined CP;  Did ask about BioFreeze and where to obtain, plans to purchase and use on R TFL  7/7:  Prone CP from R ASIS to L QL x15 mins    Charges:  Timed Code Treatment Minutes: 40   Total Treatment Minutes: 55     [] EVAL - LOW (86492)   [] EVAL - MOD (09823)  [] EVAL - HIGH (90291)  [] RE-EVAL (14561)  [x] [place](54447) x 2      [] Ionto  [] NMR (30748) x       [] Vaso  [x] Manual (09876) x 1      [] Ultrasound  [] TA x        [] Mech Traction (24075)  [] Aquatic Therapy x      [] ES (un) (07183):   [] Home Management Training x  [] ES(attended) (42929)   [] Dry Needling 1-2 muscles (14712):  [] Dry Needling 3+ muscles (815238)  [] Group:      [] Other:     GOALS:   Patient stated goal: decreased pain during functional mobility  [] Progressing: [] Met: [] Not Met: [] Adjusted     Therapist goals for Patient:   Short Term Goals: To be achieved in: 2 weeks  1. Independent in HEP and progression per patient tolerance, in order to prevent re-injury. [] Progressing: [] Met: [] Not Met: [] Adjusted  2. Patient will have a decrease in pain to facilitate improvement in movement, function, and ADLs as indicated by Functional Deficits. [] Progressing: [] Met: [] Not Met: [] Adjusted    Long Term Goals: To be achieved in: 8 weeks  1. Disability index score of 30% or less for the ALENA to assist with reaching prior level of function. [] Progressing: [] Met: [] Not Met: [] Adjusted  2. Patient will demonstrate increased AROM to WNL, good LS mobility, good hip ROM to allow for proper joint functioning as indicated by patients Functional Deficits. [] Progressing: [] Met: [] Not Met: [] Adjusted  3. Patient will demonstrate an increase in Strength to good proximal hip and core activation to allow for proper functional mobility as indicated by patients Functional Deficits. [] Progressing: [] Met: [] Not Met: [] Adjusted  4.  Patient will return to functional activities including sitting, standing, walking without increased symptoms or restriction. [] Progressing: [] Met: [] Not Met: [] Adjusted    Overall Progression Towards Functional goals/ Treatment Progress Update:  [] Patient is progressing as expected towards functional goals listed. [] Progression is slowed due to complexities/Impairments listed. [] Progression has been slowed due to co-morbidities. [x] Plan just implemented, too soon to assess goals progression <30days   [] Goals require adjustment due to lack of progress  [] Patient is not progressing as expected and requires additional follow up with physician  [] Other    Persisting Functional Limitations/Impairments:  [x]Sleeping [x]Sitting               [x]Standing []Transfers        [x]Walking []Kneeling               []Stairs []Squatting / bending   []ADLs []Reaching  []Lifting  []Housework  []Driving []Job related tasks  []Sports/Recreation []Other:        ASSESSMENT:   Manual therapy done prior to strengthening and patient had relief with STM, but mild increase in pain again following standing hip SLR. Pt required cueing for pelvic tilts on the swiss ball but able to perform M/L with less cueing. Pt instructed to keep pelvic tilts to right smaller to lessen pain. Pt to benefit from continued therapy for reducing muscular restrictions and increase core strength. Treatment/Activity Tolerance:  [x] Patient able to complete tx  [] Patient limited by fatigue  [x] Patient limited by pain  [] Patient limited by other medical complications  [] Other:     Prognosis: [x] Good [] Fair  [] Poor    Patient Requires Follow-up: [x] Yes  [] No    Plan for next treatment session:    PLAN: See eval. PT 1-2x / week for 8 weeks.    [x] Continue per plan of care [] Alter current plan (see comments)  [] Plan of care initiated [] Hold pending MD visit [] Discharge    Electronically signed by: Maureen Rausch PT, DPT    Note: If patient does not return for scheduled/ recommended follow up visits, this note will serve as a discharge from care along with most recent update on progress.

## 2020-07-15 ENCOUNTER — HOSPITAL ENCOUNTER (OUTPATIENT)
Dept: PHYSICAL THERAPY | Age: 55
Setting detail: THERAPIES SERIES
Discharge: HOME | End: 2020-07-15
Payer: COMMERCIAL

## 2020-07-15 PROCEDURE — 97140 MANUAL THERAPY 1/> REGIONS: CPT

## 2020-07-15 PROCEDURE — 97110 THERAPEUTIC EXERCISES: CPT

## 2020-07-15 PROCEDURE — 97112 NEUROMUSCULAR REEDUCATION: CPT

## 2020-07-15 NOTE — FLOWSHEET NOTE
[address] [facility] Physical Therapy  Phone: [phone]   Fax: [phone]    Physical Therapy Daily Treatment Note  Date:  7/15/2020    Patient Name:  India Cooper    :  1965  MRN: [id number]  Medical/Treatment Diagnosis Information:  Diagnosis: M54.5 (ICD-10-CM) - Low back pain  Treatment Diagnosis: decreased lumbar ROM, poor core activation, TTP R lumbar paraspinals, R QL, and R glute at iliac insertion, dec hip strength  Insurance/Certification information:  PT Insurance Information: [place] 30 visits/yr  Physician Information:  Referring Practitioner: HOWARD Lewis CNP  Plan of care signed (Y/N): []  Yes [x]  No Sent     Date of Patient follow up with Physician:      Progress Report: []  Yes  [x]  No     Date Range for reporting period:  Beginnin2020  Ending:     Progress report due (10 Rx/or 30 days whichever is less): visit #10 or 2685      Recertification due (POC duration/ or 90 days whichever is less):  2020     Visit # Insurance Allowable Auth required? Date Range    30/yr []  Yes  [x]  No Through      Latex Allergy:  [x]NO      []YES  Preferred Language for Healthcare:   []English       [x]other: German but prefers not to use     Functional Scale:        Date assessed:  Oswestry: raw score = 31/50; dysfunction = 62%             20    Pain level:  5-6/10     SUBJECTIVE:  Felt okay following last session, pain is about the same as it has been. Prefers to perform ex's first then STM at end of session, felt the hot pack helped.       OBJECTIVE:   : pt 15 min late to appt      RESTRICTIONS/PRECAUTIONS: none    Exercises/Interventions:   Therapeutic Exercises (43559) Resistance / level Sets/sec Reps Notes   Sci-fit   Bike #1 1.0  3.0   5 mins     IB / HR  2x30\" 2x15    Seated piriformis stretch       Swiss ball (red):  · Pelvic tilts: A/P, M/L, CW & CCW circles  · Alt UE flex  · March    · Mid row cervical, postural, scapular, scapulothoracic and UE control with self care, reaching, carrying, lifting, house/yardwork, driving, computer work.  [] (42356) Provided verbal/tactile cueing for activities related to improving balance, coordination, kinesthetic sense, posture, motor skill, proprioception to assist with   [] LE / lumbar: LE, proximal hip, and core control in self care, mobility, lifting, ambulation and eccentric single leg control. [] UE / cervical: cervical, scapular, scapulothoracic and UE control with self care, reaching, carrying, lifting, house/yardwork, driving, computer work.   [] (95080) Therapist is in constant attendance of 2 or more patients providing skilled therapy interventions, but not providing any significant amount of measurable one-on-one time to either patient, for improvements in  [] LE / lumbar: LE, proximal hip, and core control in self care, mobility, lifting, ambulation and eccentric single leg control. [] UE / cervical: cervical, scapular, scapulothoracic and UE control with self care, reaching, carrying, lifting, house/yardwork, driving, computer work.      NMR and Therapeutic Activities:    [] (09393 or 91774) Provided verbal/tactile cueing for activities related to improving balance, coordination, kinesthetic sense, posture, motor skill, proprioception and motor activation to allow for proper function of   [] LE: / Lumbar core, proximal hip and LE with self care and ADLs  [] UE / Cervical: cervical, postural, scapular, scapulothoracic and UE control with self care, carrying, lifting, driving, computer work.   [] (53822) Gait Re-education- Provided training and instruction to the patient for proper LE, core and proximal hip recruitment and positioning and eccentric body weight control with ambulation re-education including up and down stairs     Home Management Training / Self Care:  [] (98912) Provided self-care/home management training related to activities of daily living and compensatory training, and/or use of adaptive equipment for improvement with: ADLs and compensatory training, meal preparation, safety procedures and instruction in use of adaptive equipment, including bathing, grooming, dressing, personal hygiene, basic household cleaning and chores. Home Exercise Program:    [x] (87980) Reviewed/Progressed HEP activities related to strengthening, flexibility, endurance, ROM of   [x] LE / Lumbar: core, proximal hip and LE for functional self-care, mobility, lifting and ambulation/stair navigation   [] UE / Cervical: cervical, postural, scapular, scapulothoracic and UE control with self care, reaching, carrying, lifting, house/yardwork, driving, computer work  [] (29431)Reviewed/Progressed HEP activities related to improving balance, coordination, kinesthetic sense, posture, motor skill, proprioception of   [] LE: core, proximal hip and LE for self care, mobility, lifting, and ambulation/stair navigation    [] UE / Cervical: cervical, postural,  scapular, scapulothoracic and UE control with self care, reaching, carrying, lifting, house/yardwork, driving, computer work    Manual Treatments:  PROM / STM / Oscillations-Mobs:  G-I, II, III, IV (PA's, Inf., Post.)  [x] (72345) Provided manual therapy to mobilize LE, proximal hip and/or LS spine soft tissue/joints for the purpose of modulating pain, promoting relaxation,  increasing ROM, reducing/eliminating soft tissue swelling/inflammation/restriction, improving soft tissue extensibility and allowing for proper ROM for normal function with   [x] LE / lumbar: self care, mobility, lifting and ambulation. [] UE / Cervical: self care, reaching, carrying, lifting, house/yardwork, driving, computer work.      Modalities:  [] (26018) Vasopneumatic compression: Utilized vasopneumatic compression to decrease edema / swelling for the purpose of improving mobility and quad tone / recruitment which will allow for increased overall function including but not limited to self-care, transfers, ambulation, and ascending / descending stairs. Modalities:    7/15:  MHP to R low back/glute x 15 min in prone    7/13: MHP to R low back/glute x 15 min in hooklying  7/10:  Declined CP;  Did ask about BioFreeze and where to obtain, plans to purchase and use on R TFL  7/7:  Prone CP from R ASIS to L QL x15 mins    Charges:  Timed Code Treatment Minutes: 45   Total Treatment Minutes: 56     [] EVAL - LOW (20130)   [] EVAL - MOD (52976)  [] EVAL - HIGH (01087)  [] RE-EVAL (30427)  [x] VY(80031) x 1      [] Ionto  [x] NMR (03083) x 1      [] Vaso  [x] Manual (34665) x 1      [] Ultrasound  [] TA x        [] Mech Traction (42563)  [] Aquatic Therapy x      [] ES (un) (30700):   [] Home Management Training x  [] ES(attended) (73506)   [] Dry Needling 1-2 muscles (58132):  [] Dry Needling 3+ muscles (410708)  [] Group:      [] Other:     GOALS:   Patient stated goal: decreased pain during functional mobility  [] Progressing: [] Met: [] Not Met: [] Adjusted     Therapist goals for Patient:   Short Term Goals: To be achieved in: 2 weeks  1. Independent in HEP and progression per patient tolerance, in order to prevent re-injury. [] Progressing: [] Met: [] Not Met: [] Adjusted  2. Patient will have a decrease in pain to facilitate improvement in movement, function, and ADLs as indicated by Functional Deficits. [] Progressing: [] Met: [] Not Met: [] Adjusted    Long Term Goals: To be achieved in: 8 weeks  1. Disability index score of 30% or less for the ALENA to assist with reaching prior level of function. [] Progressing: [] Met: [] Not Met: [] Adjusted  2. Patient will demonstrate increased AROM to WNL, good LS mobility, good hip ROM to allow for proper joint functioning as indicated by patients Functional Deficits. [] Progressing: [] Met: [] Not Met: [] Adjusted  3.  Patient will demonstrate an increase in Strength to good proximal hip and core activation to allow for proper functional mobility as indicated by patients Functional Deficits. [] Progressing: [] Met: [] Not Met: [] Adjusted  4. Patient will return to functional activities including sitting, standing, walking without increased symptoms or restriction. [] Progressing: [] Met: [] Not Met: [] Adjusted    Overall Progression Towards Functional goals/ Treatment Progress Update:  [] Patient is progressing as expected towards functional goals listed. [] Progression is slowed due to complexities/Impairments listed. [] Progression has been slowed due to co-morbidities. [x] Plan just implemented, too soon to assess goals progression <30days   [] Goals require adjustment due to lack of progress  [] Patient is not progressing as expected and requires additional follow up with physician  [] Other    Persisting Functional Limitations/Impairments:  [x]Sleeping [x]Sitting               [x]Standing []Transfers        [x]Walking []Kneeling               []Stairs []Squatting / bending   []ADLs []Reaching  []Lifting  []Housework  []Driving []Job related tasks  []Sports/Recreation []Other:        ASSESSMENT:   Developed B knee pain while performing squats on [employer] gym but demo'd improved pelvic movements with Wallisian ball. Core stability is progressing but R hip pain persists. Treatment/Activity Tolerance:  [x] Patient able to complete tx  [] Patient limited by fatigue  [x] Patient limited by pain  [] Patient limited by other medical complications  [] Other:     Prognosis: [x] Good [] Fair  [] Poor    Patient Requires Follow-up: [x] Yes  [] No    Plan for next treatment session:    PLAN: See sahra PT 1-2x / week for 8 weeks.    [x] Continue per plan of care [] Alter current plan (see comments)  [] Plan of care initiated [] Hold pending MD visit [] Discharge    Electronically signed by: Janet Romero PT, DPT    Note: If patient does not return for scheduled/ recommended follow up visits, this note will serve as a discharge from care along with most recent update on progress.

## 2020-07-20 ENCOUNTER — HOSPITAL ENCOUNTER (OUTPATIENT)
Dept: PHYSICAL THERAPY | Age: 55
Setting detail: THERAPIES SERIES
Discharge: HOME | End: 2020-07-20
Payer: COMMERCIAL

## 2020-07-20 PROCEDURE — 97140 MANUAL THERAPY 1/> REGIONS: CPT

## 2020-07-20 PROCEDURE — 97110 THERAPEUTIC EXERCISES: CPT

## 2020-07-20 NOTE — FLOWSHEET NOTE
[address] [facility]  Phone: [phone]   Fax: [phone]    Physical Therapy Daily Treatment Note  Date:  2020    Patient Name:  Dayna Lanes    :  1965  MRN: [id number]  Medical/Treatment Diagnosis Information:  Diagnosis: M54.5 (ICD-10-CM) - Low back pain  Treatment Diagnosis: decreased lumbar ROM, poor core activation, TTP R lumbar paraspinals, R QL, and R glute at iliac insertion, dec hip strength  Insurance/Certification information:  PT Insurance Information: [employer] 30 visits/yr  Physician Information:  Referring Practitioner: Coley Lefort, APRN - CNP  Plan of care signed (Y/N): []  Yes [x]  No Sent     Date of Patient follow up with Physician:      Progress Report: []  Yes  [x]  No     Date Range for reporting period:  Beginnin2020  Ending:     Progress report due (10 Rx/or 30 days whichever is less): visit #10 or 3/91/1088      Recertification due (POC duration/ or 90 days whichever is less):  2020     Visit # Insurance Allowable Auth required? Date Range    30/yr []  Yes  [x]  No Through      Latex Allergy:  [x]NO      []YES  Preferred Language for Healthcare:   []English       [x]other: German but prefers not to use     Functional Scale:        Date assessed:  Oswestry: raw score = 31/50; dysfunction = 62%             20    Pain level:  5-6/10     SUBJECTIVE:   Lateral hip pain remains largely unchanged. Felt about the same following last session. Having some pain a little more on the anterior aspect of her R thigh today.        OBJECTIVE:   : pt 15 min late to appt      RESTRICTIONS/PRECAUTIONS: none    Exercises/Interventions:   Therapeutic Exercises (58495) Resistance / level Sets/sec Reps Notes   Sci-fit   Bike #1 1.0  3.0   5 mins     IB / HR  2x30\" 2x15    Seated piriformis stretch       Swiss ball (red):  · Pelvic tilts: A/P, M/L, CW & CCW circles  · Alt UE flex  · March    ·  scapular, scapulothoracic and UE control with self care, reaching, carrying, lifting, house/yardwork, driving, computer work.  [] (34089) Provided verbal/tactile cueing for activities related to improving balance, coordination, kinesthetic sense, posture, motor skill, proprioception to assist with   [] LE / lumbar: LE, proximal hip, and core control in self care, mobility, lifting, ambulation and eccentric single leg control. [] UE / cervical: cervical, scapular, scapulothoracic and UE control with self care, reaching, carrying, lifting, house/yardwork, driving, computer work.   [] (07528) Therapist is in constant attendance of 2 or more patients providing skilled therapy interventions, but not providing any significant amount of measurable one-on-one time to either patient, for improvements in  [] LE / lumbar: LE, proximal hip, and core control in self care, mobility, lifting, ambulation and eccentric single leg control. [] UE / cervical: cervical, scapular, scapulothoracic and UE control with self care, reaching, carrying, lifting, house/yardwork, driving, computer work.      NMR and Therapeutic Activities:    [] (12709 or 74951) Provided verbal/tactile cueing for activities related to improving balance, coordination, kinesthetic sense, posture, motor skill, proprioception and motor activation to allow for proper function of   [] LE: / Lumbar core, proximal hip and LE with self care and ADLs  [] UE / Cervical: cervical, postural, scapular, scapulothoracic and UE control with self care, carrying, lifting, driving, computer work.   [] (80085) Gait Re-education- Provided training and instruction to the patient for proper LE, core and proximal hip recruitment and positioning and eccentric body weight control with ambulation re-education including up and down stairs     Home Management Training / Self Care:  [] (21083) Provided self-care/home management training related to activities of daily living and compensatory training, and/or use of adaptive equipment for improvement with: ADLs and compensatory training, meal preparation, safety procedures and instruction in use of adaptive equipment, including bathing, grooming, dressing, personal hygiene, basic household cleaning and chores. Home Exercise Program:    [x] (77444) Reviewed/Progressed HEP activities related to strengthening, flexibility, endurance, ROM of   [x] LE / Lumbar: core, proximal hip and LE for functional self-care, mobility, lifting and ambulation/stair navigation   [] UE / Cervical: cervical, postural, scapular, scapulothoracic and UE control with self care, reaching, carrying, lifting, house/yardwork, driving, computer work  [] (86603)Reviewed/Progressed HEP activities related to improving balance, coordination, kinesthetic sense, posture, motor skill, proprioception of   [] LE: core, proximal hip and LE for self care, mobility, lifting, and ambulation/stair navigation    [] UE / Cervical: cervical, postural,  scapular, scapulothoracic and UE control with self care, reaching, carrying, lifting, house/yardwork, driving, computer work    Manual Treatments:  PROM / STM / Oscillations-Mobs:  G-I, II, III, IV (PA's, Inf., Post.)  [x] (39813) Provided manual therapy to mobilize LE, proximal hip and/or LS spine soft tissue/joints for the purpose of modulating pain, promoting relaxation,  increasing ROM, reducing/eliminating soft tissue swelling/inflammation/restriction, improving soft tissue extensibility and allowing for proper ROM for normal function with   [x] LE / lumbar: self care, mobility, lifting and ambulation. [] UE / Cervical: self care, reaching, carrying, lifting, house/yardwork, driving, computer work.      Modalities:  [] (50481) Vasopneumatic compression: Utilized vasopneumatic compression to decrease edema / swelling for the purpose of improving mobility and quad tone / recruitment which will allow for increased overall function including but along with most recent update on progress.

## 2020-07-22 ENCOUNTER — HOSPITAL ENCOUNTER (OUTPATIENT)
Dept: PHYSICAL THERAPY | Age: 55
Setting detail: THERAPIES SERIES
Discharge: HOME | End: 2020-07-22
Payer: COMMERCIAL

## 2020-07-22 PROCEDURE — 97110 THERAPEUTIC EXERCISES: CPT

## 2020-07-22 PROCEDURE — 97140 MANUAL THERAPY 1/> REGIONS: CPT

## 2020-07-22 PROCEDURE — 97530 THERAPEUTIC ACTIVITIES: CPT

## 2020-07-27 ENCOUNTER — HOSPITAL ENCOUNTER (OUTPATIENT)
Dept: PHYSICAL THERAPY | Age: 55
Setting detail: THERAPIES SERIES
Discharge: HOME | End: 2020-07-27
Payer: COMMERCIAL

## 2020-07-27 PROCEDURE — 97112 NEUROMUSCULAR REEDUCATION: CPT

## 2020-07-27 PROCEDURE — 97110 THERAPEUTIC EXERCISES: CPT

## 2020-07-27 NOTE — FLOWSHEET NOTE
[address] [facility]  Phone: [phone]   Fax: [phone]    Physical Therapy Daily Treatment Note  Date:  2020    Patient Name:  Radha Sevilla    :  1965  MRN: [id number]  Medical/Treatment Diagnosis Information:  Diagnosis: M54.5 (ICD-10-CM) - Low back pain  Treatment Diagnosis: decreased lumbar ROM, poor core activation, TTP R lumbar paraspinals, R QL, and R glute at iliac insertion, dec hip strength  Insurance/Certification information:  PT Insurance Information: [employer] 30 visits/yr  Physician Information:  Referring Practitioner: HOWARD Campos - CNP  Plan of care signed (Y/N): []  Yes [x]  No Sent , resent     Date of Patient follow up with Physician:      Progress Report: [x]  Yes  []  No     Date Range for reporting period:  Beginnin2020  Endin2020    Progress report due (10 Rx/or 30 days whichever is less): visit #10 or       Recertification due (POC duration/ or 90 days whichever is less):  2020     Visit # Insurance Allowable Auth required? Date Range    30 []  Yes  [x]  No Through      Latex Allergy:  [x]NO      []YES  Preferred Language for Healthcare:   []English       [x]other: Serbian but prefers not to use     Functional Scale:        Date assessed:  Oswestry: raw score = 31/50; dysfunction = 62%             20     Pain level:  3/10     SUBJECTIVE:      Doing okay today, pain is slightly better, located in R buttocks and at TFL today. Had a good weekend with limited activity due to COVID-19. Was just tired following last session, pain levels didn't increase.       OBJECTIVE:   : pt 15 min late to appt    :  Strength / Myotomes LEFT RIGHT Comments   Multifidus         Transverse Ab         Hip Flexors (L1-2) 5 4     Hip Abductors 4+ 4+     Hip Extensors  4- 4-      Quads (L2-4) 4+ 4+     Hamstrings  -* 4- *induced cramping    Ankle Plantarflexion (S1-2) 5 5     Ankle Dorsiflexion (L4-5) 5 5            RESTRICTIONS/PRECAUTIONS: none    Exercises/Interventions:   Therapeutic Exercises (00504) Resistance / level Sets/sec Reps Notes   Sci-fit   Bike #1 1.0  3.0   5 mins     IB / HR  2x30\" 2x15    Seated piriformis stretch       Swiss ball (red):  · Pelvic tilts: A/P, M/L, CW & CCW circles  · Alt UE flex  · March    · Mid row             2.5 pl             No UE support    Incr difficulty maintaining balance    Supine:  · PPT  · LTR  · Glute set  · March  · Bridge  · SLR  · TrA isometric  1015    -cues to reduce ROM to R to improve pain      -decreased AROM R  -max verbal & visual cues for improved technique   Prone:  · Hip ext  · Knee flex     0#  3#   2       S/L: clamshells       Standing in //bars:  BOSU  · Hip flex  · Hip ext  · Hip Abd     · Mini squats     blue  blue  Blue    AirEx         7/13: done following manual             Minimal cueing for improved form    Cable column  · Side stepping   · 3-way hip   2 pl  1 pl    4 B  10 B                  Therapeutic Activities (10143)              Education provided: pathomechanics of injury, expectations for therapy, HEP review    Pt with good understanding of all education provided                        Neuromuscular Re-ed (52641)       //bars  · Fwd step ups  · Lat step ups    BOSU  · Fwd step ups to SLS   6\"  6\"      blue    10 B  10 B      10 B   No UE assist  No UE assist   SB \"squeezes\"/crunch; B UE/LE's                                 Manual Intervention (30033)         L S/L: Magic Stick  R glute med & min, TFL & IT-Band, R VLO,     incr sensitivity at R VLO   Prone: STM , R piriformis, R glute max       Prone R quad stretch  Piriformis stretch  Prone R hip flexor stretch          -pt able to perform 1 time with instruction   Prone:  STM R PSIS, along R lateral border of sacrum and adjacent R glute max -blue massage tool used    5 mins                       Pt. Education:      Home Exercise Program:  Access Code: [email or web address]   URL: [email or web address]   Date: 06/23/2020   Prepared by: Nallely Adlerl     Exercises   Supine Posterior Pelvic Tilt - 10 reps - 2 sets - 5 hold - 1x daily - 7x weekly   Supine Lower Trunk Rotation - 10 reps - 2 sets - 1x daily - 7x weekly   Hooklying Gluteal Sets - 10 reps - 2 sets - 1x daily - 7x weekly   Supine March - 10 reps - 2 sets - 1x daily - 7x weekly     Therapeutic Exercise and NMR EXR  [x] (76419) Provided verbal/tactile cueing for activities related to strengthening, flexibility, endurance, ROM for improvements in  [x] LE / Lumbar: LE, proximal hip, and core control with self care, mobility, lifting, ambulation. [] UE / Cervical: cervical, postural, scapular, scapulothoracic and UE control with self care, reaching, carrying, lifting, house/yardwork, driving, computer work.  [] (25835) Provided verbal/tactile cueing for activities related to improving balance, coordination, kinesthetic sense, posture, motor skill, proprioception to assist with   [] LE / lumbar: LE, proximal hip, and core control in self care, mobility, lifting, ambulation and eccentric single leg control. [] UE / cervical: cervical, scapular, scapulothoracic and UE control with self care, reaching, carrying, lifting, house/yardwork, driving, computer work.   [] (76034) Therapist is in constant attendance of 2 or more patients providing skilled therapy interventions, but not providing any significant amount of measurable one-on-one time to either patient, for improvements in  [] LE / lumbar: LE, proximal hip, and core control in self care, mobility, lifting, ambulation and eccentric single leg control. [] UE / cervical: cervical, scapular, scapulothoracic and UE control with self care, reaching, carrying, lifting, house/yardwork, driving, computer work.      NMR and Therapeutic Activities:    [] (26158 or ) Provided verbal/tactile cueing for activities related to improving balance, coordination, kinesthetic sense, posture, motor skill, proprioception and motor activation to allow for proper function of   [] LE: / Lumbar core, proximal hip and LE with self care and ADLs  [] UE / Cervical: cervical, postural, scapular, scapulothoracic and UE control with self care, carrying, lifting, driving, computer work.   [] (70563) Gait Re-education- Provided training and instruction to the patient for proper LE, core and proximal hip recruitment and positioning and eccentric body weight control with ambulation re-education including up and down stairs     Home Management Training / Self Care:  [] (66526) Provided self-care/home management training related to activities of daily living and compensatory training, and/or use of adaptive equipment for improvement with: ADLs and compensatory training, meal preparation, safety procedures and instruction in use of adaptive equipment, including bathing, grooming, dressing, personal hygiene, basic household cleaning and chores.      Home Exercise Program:    [x] (78548) Reviewed/Progressed HEP activities related to strengthening, flexibility, endurance, ROM of   [x] LE / Lumbar: core, proximal hip and LE for functional self-care, mobility, lifting and ambulation/stair navigation   [] UE / Cervical: cervical, postural, scapular, scapulothoracic and UE control with self care, reaching, carrying, lifting, house/yardwork, driving, computer work  [] (03901)Reviewed/Progressed HEP activities related to improving balance, coordination, kinesthetic sense, posture, motor skill, proprioception of   [] LE: core, proximal hip and LE for self care, mobility, lifting, and ambulation/stair navigation    [] UE / Cervical: cervical, postural,  scapular, scapulothoracic and UE control with self care, reaching, carrying, lifting, house/yardwork, driving, computer work    Manual Treatments:  PROM / STM / Oscillations-Mobs:  G-I, II, III, IV (PA's, Inf., Post.)  [x] (73106) Provided manual therapy to mobilize LE, proximal hip and/or LS spine soft tissue/joints for the purpose of modulating pain, promoting relaxation,  increasing ROM, reducing/eliminating soft tissue swelling/inflammation/restriction, improving soft tissue extensibility and allowing for proper ROM for normal function with   [x] LE / lumbar: self care, mobility, lifting and ambulation. [] UE / Cervical: self care, reaching, carrying, lifting, house/yardwork, driving, computer work. Modalities:  [] (25278) Vasopneumatic compression: Utilized vasopneumatic compression to decrease edema / swelling for the purpose of improving mobility and quad tone / recruitment which will allow for increased overall function including but not limited to self-care, transfers, ambulation, and ascending / descending stairs. Modalities:    7/22, 7/20:  L S/L:  MHP R lateral hip and proximal IT-band x10 mins    7/15:  MHP to R low back/glute x 15 min in prone  7/13: MHP to R low back/glute x 15 min in hooklying  7/10:  Declined CP;  Did ask about BioFreeze and where to obtain, plans to purchase and use on R TFL  7/7:  Prone CP from R ASIS to L QL x15 mins    Charges:  Timed Code Treatment Minutes: 45   Total Treatment Minutes: 45     [] EVAL - LOW (88687)   [] EVAL - MOD (07202)  [] EVAL - HIGH (08004)  [] RE-EVAL (17619)  [x] MA(25184) x 1      [] Ionto  [] NMR (79217) x       [] Vaso  [x] Manual (34309) x 1      [] Ultrasound  [x] TA x 1      [] Mech Traction (78466)  [] Aquatic Therapy x      [] ES (un) (54684):   [] Home Management Training x  [] ES(attended) (38146)   [] Dry Needling 1-2 muscles (23637):  [] Dry Needling 3+ muscles (728615)  [] Group:      [] Other:     GOALS:   Patient stated goal: decreased pain during functional mobility  [] Progressing: [] Met: [] Not Met: [] Adjusted     Therapist goals for Patient:   Short Term Goals: To be achieved in: 2 weeks  1.  Independent in HEP and progression per patient tolerance, in order to prevent date, improved w/STM. Pt demo'd difficulty kevin transverse abdominis, leading to pelvic instability, but pain levels steadily improved as session progressed. Tolerated session well. Treatment/Activity Tolerance:  [x] Patient able to complete tx  [] Patient limited by fatigue  [x] Patient limited by pain  [] Patient limited by other medical complications  [] Other:     Prognosis: [x] Good [] Fair  [] Poor    Patient Requires Follow-up: [x] Yes  [] No    Plan for next treatment session:    PLAN: See eval. PT 1-2x / week for 8 weeks. [x] Continue per plan of care [] Alter current plan (see comments)  [] Plan of care initiated [] Hold pending MD visit [] Discharge    Electronically signed by: Cheli Fong PT, DPT    Note: If patient does not return for scheduled/ recommended follow up visits, this note will serve as a discharge from care along with most recent update on progress.

## 2020-07-29 ENCOUNTER — HOSPITAL ENCOUNTER (OUTPATIENT)
Dept: PHYSICAL THERAPY | Age: 55
Setting detail: THERAPIES SERIES
Discharge: HOME | End: 2020-07-29
Payer: COMMERCIAL

## 2020-07-29 PROCEDURE — 97110 THERAPEUTIC EXERCISES: CPT

## 2020-07-29 PROCEDURE — 97112 NEUROMUSCULAR REEDUCATION: CPT

## 2020-07-29 PROCEDURE — 97140 MANUAL THERAPY 1/> REGIONS: CPT

## 2020-07-29 NOTE — FLOWSHEET NOTE
none    Exercises/Interventions:   Therapeutic Exercises (33237) Resistance / level Sets/sec Reps Notes   Sci-fit   Bike #1 1.0  3.0   7 mins     IB / HR  2x30\" 2x15    Seated piriformis stretch       Swiss ball (red):  · Pelvic tilts: A/P, M/L, CW & CCW circles  · Alt UE flex  · March    · Mid row             2.5 pl             No UE support    Incr difficulty maintaining balance    Supine:  · PPT  · LTR  · Glute set  · March  · Bridge  · SLR  · TrA isometric      -cues to reduce ROM to R to improve pain      -decreased AROM R  -max verbal & visual cues for improved technique   Prone:  · Hip ext  · Knee flex     0#  3#   2       S/L: clamshells       Standing in //bars:  BOSU  · Hip flex  · Hip ext  · Hip Abd     · Mini squats     blue  blue  Blue    AirEx         7/13: done following manual             Minimal cueing for improved form    Cable column  · Side stepping   · 3-way hip   2 pl  1 pl    10 B  10 B                  Therapeutic Activities (92597)              Education provided: pathomechanics of injury, expectations for therapy, HEP review    Pt with good understanding of all education provided                        Neuromuscular Re-ed (14895)       //bars  · Fwd step ups  · Lat step ups    BOSU  · Fwd step ups to SLS   6\"  6\"      blue    10 B  10 B      10 B   No UE assist  No UE assist      Intermittent UE assist   SB \"squeezes\"/crunch; B UE/LE's            SLS  X 20\" 2 B                  Manual Intervention (82341)         L S/L: Magic Stick  R glute med & min, TFL & IT-Band, R VLO,     incr sensitivity at R VLO   Prone: STM , R piriformis, R glute max       Prone R quad stretch  Piriformis stretch  Prone R hip flexor stretch          -pt able to perform 1 time with instruction   Prone:  STM R PSIS, along R lateral border of sacrum and adjacent R glute max     10 mins    With free-up cream                     Pt. Education:      Home Exercise Program:  Access Code: [email or web address]   URL: motor skill, proprioception and motor activation to allow for proper function of   [] LE: / Lumbar core, proximal hip and LE with self care and ADLs  [] UE / Cervical: cervical, postural, scapular, scapulothoracic and UE control with self care, carrying, lifting, driving, computer work.   [] (98109) Gait Re-education- Provided training and instruction to the patient for proper LE, core and proximal hip recruitment and positioning and eccentric body weight control with ambulation re-education including up and down stairs     Home Management Training / Self Care:  [] (93002) Provided self-care/home management training related to activities of daily living and compensatory training, and/or use of adaptive equipment for improvement with: ADLs and compensatory training, meal preparation, safety procedures and instruction in use of adaptive equipment, including bathing, grooming, dressing, personal hygiene, basic household cleaning and chores.      Home Exercise Program:    [x] (82440) Reviewed/Progressed HEP activities related to strengthening, flexibility, endurance, ROM of   [x] LE / Lumbar: core, proximal hip and LE for functional self-care, mobility, lifting and ambulation/stair navigation   [] UE / Cervical: cervical, postural, scapular, scapulothoracic and UE control with self care, reaching, carrying, lifting, house/yardwork, driving, computer work  [] (32825)Reviewed/Progressed HEP activities related to improving balance, coordination, kinesthetic sense, posture, motor skill, proprioception of   [] LE: core, proximal hip and LE for self care, mobility, lifting, and ambulation/stair navigation    [] UE / Cervical: cervical, postural,  scapular, scapulothoracic and UE control with self care, reaching, carrying, lifting, house/yardwork, driving, computer work    Manual Treatments:  PROM / STM / Oscillations-Mobs:  G-I, II, III, IV (PA's, Inf., Post.)  [x] (13947) Provided manual therapy to mobilize LE, proximal hip and/or LS spine soft tissue/joints for the purpose of modulating pain, promoting relaxation,  increasing ROM, reducing/eliminating soft tissue swelling/inflammation/restriction, improving soft tissue extensibility and allowing for proper ROM for normal function with   [x] LE / lumbar: self care, mobility, lifting and ambulation. [] UE / Cervical: self care, reaching, carrying, lifting, house/yardwork, driving, computer work. Modalities:  [] (46046) Vasopneumatic compression: Utilized vasopneumatic compression to decrease edema / swelling for the purpose of improving mobility and quad tone / recruitment which will allow for increased overall function including but not limited to self-care, transfers, ambulation, and ascending / descending stairs. Modalities:    7/29, 7/22, 7/20:  L S/L:  MHP R lateral hip and proximal IT-band x10 mins    7/15:  MHP to R low back/glute x 15 min in prone  7/13: MHP to R low back/glute x 15 min in hooklying  7/10:  Declined CP;  Did ask about BioFreeze and where to obtain, plans to purchase and use on R TFL  7/7:  Prone CP from R ASIS to L QL x15 mins    Charges:  Timed Code Treatment Minutes: 45   Total Treatment Minutes: 55     [] EVAL - LOW (07297)   [] EVAL - MOD (81682)  [] EVAL - HIGH (10986)  [] RE-EVAL (88559)  [x] BQ(51274) x 1      [] Ionto  [x] NMR (92143) x  1     [] Vaso  [x] Manual (29064) x 1      [] Ultrasound  [] TA x       [] Mech Traction (42949)  [] Aquatic Therapy x      [] ES (un) (26530):   [] Home Management Training x  [] ES(attended) (15625)   [] Dry Needling 1-2 muscles (76388):  [] Dry Needling 3+ muscles (333942)  [] Group:      [] Other:     GOALS:   Patient stated goal: decreased pain during functional mobility  [] Progressing: [] Met: [] Not Met: [] Adjusted     Therapist goals for Patient:   Short Term Goals: To be achieved in: 2 weeks  1. Independent in HEP and progression per patient tolerance, in order to prevent re-injury.    [] Progressing: [x] Met: [] Not Met: [] Adjusted  2. Patient will have a decrease in pain to facilitate improvement in movement, function, and ADLs as indicated by Functional Deficits. [x] Progressing: [] Met: [] Not Met: [] Adjusted    Long Term Goals: To be achieved in: 8 weeks  1. Disability index score of 30% or less for the ALENA to assist with reaching prior level of function. [] Progressing: [] Met: [] Not Met: [] Adjusted  2. Patient will demonstrate increased AROM to WNL, good LS mobility, good hip ROM to allow for proper joint functioning as indicated by patients Functional Deficits. [] Progressing: [] Met: [] Not Met: [] Adjusted  3. Patient will demonstrate an increase in Strength to good proximal hip and core activation to allow for proper functional mobility as indicated by patients Functional Deficits. [] Progressing: [] Met: [] Not Met: [] Adjusted  4. Patient will return to functional activities including sitting, standing, walking without increased symptoms or restriction. [x] Progressing: [] Met: [] Not Met: [] Adjusted    Overall Progression Towards Functional goals/ Treatment Progress Update:  [] Patient is progressing as expected towards functional goals listed. [] Progression is slowed due to complexities/Impairments listed. [] Progression has been slowed due to co-morbidities. [x] Plan just implemented, too soon to assess goals progression <30days   [] Goals require adjustment due to lack of progress  [] Patient is not progressing as expected and requires additional follow up with physician  [] Other    Persisting Functional Limitations/Impairments:  [x]Sleeping [x]Sitting               [x]Standing []Transfers        [x]Walking []Kneeling               []Stairs []Squatting / bending   []ADLs []Reaching  []Lifting  []Housework  []Driving []Job related tasks  []Sports/Recreation []Other:        ASSESSMENT:     Unable to progress exercises this visit due to inc R hip/LBP.  Pt demonstrates dec B hip strength with CC 3 way SLR with poor eccentric control during activities. Pt only able to achieve neutral L hip ext during exercise, with reports of R hip pain while on R standing leg, though full hip ext while on L standing leg. Pt would likely benefit from SLS activities on R LE to further strengthen R LE in closed chain positioning. Pt has 3 trigger points in R lumbar paraspinals, mildly improved with STM, pt tolerated well and reported dec pain at end of tx session. Continue to progress core and LE strengthening and balance as tolerated. Treatment/Activity Tolerance:  [x] Patient able to complete tx  [] Patient limited by fatigue  [] Patient limited by pain  [] Patient limited by other medical complications  [] Other:     Prognosis: [x] Good [] Fair  [] Poor    Patient Requires Follow-up: [x] Yes  [] No    Plan for next treatment session:    PLAN: See eval. PT 1-2x / week for 8 weeks. [x] Continue per plan of care [] Alter current plan (see comments)  [] Plan of care initiated [] Hold pending MD visit [] Discharge    Electronically signed by: Cheli Matamoros PT, DPT    Note: If patient does not return for scheduled/ recommended follow up visits, this note will serve as a discharge from care along with most recent update on progress.

## 2020-08-07 ENCOUNTER — HOSPITAL ENCOUNTER (OUTPATIENT)
Dept: PHYSICAL THERAPY | Age: 55
Setting detail: THERAPIES SERIES
Discharge: HOME | End: 2020-08-07
Payer: COMMERCIAL

## 2020-08-07 PROCEDURE — 97110 THERAPEUTIC EXERCISES: CPT

## 2020-08-07 PROCEDURE — 97140 MANUAL THERAPY 1/> REGIONS: CPT

## 2020-08-07 PROCEDURE — 97112 NEUROMUSCULAR REEDUCATION: CPT

## 2020-08-07 NOTE — FLOWSHEET NOTE
[facility]  Phone: [phone]   Fax: [phone]    Physical Therapy Daily Treatment Note  Date:  2020    Patient Name:  Tray Sears    :  1965  MRN: [id number]  Medical/Treatment Diagnosis Information:  Diagnosis: M54.5 (ICD-10-CM) - Low back pain  Treatment Diagnosis: decreased lumbar ROM, poor core activation, TTP R lumbar paraspinals, R QL, and R glute at iliac insertion, dec hip strength  Insurance/Certification information:  PT Insurance Information: [employer] 30 visits/yr  Physician Information:  Referring Practitioner: HOWARD Caldwell CNP  Plan of care signed (Y/N): []  Yes [x]  No Sent , resent     Date of Patient follow up with Physician:      Progress Report: []  Yes  [x]  No     Date Range for reporting period:  Beginnin2020  Endin2020    Progress report due (10 Rx/or 30 days whichever is less): visit #10 or 3074      Recertification due (POC duration/ or 90 days whichever is less):  2020     Visit # Insurance Allowable Auth required? Date Range   10/16 30 []  Yes  [x]  No Through      Latex Allergy:  [x]NO      []YES  Preferred Language for Healthcare:   []English       [x]other: Mongolian but prefers not to use     Functional Scale:        Date assessed:  Oswestry: raw score = 31/50; dysfunction = 62%             20     Pain level:  4-5/10     SUBJECTIVE:     Pt reports she has pain that spread to her left low back, no known reason why. Pt reports walking makes her pain better, but if she does too much, it exacerbates the pain.      OBJECTIVE:   : pt 15 min late to appt    :  Strength / Myotomes LEFT RIGHT Comments   Multifidus         Transverse Ab         Hip Flexors (L1-2) 5 4     Hip Abductors 4+ 4+     Hip Extensors  4- 4-      Quads (L2-4) 4+ 4+     Hamstrings  -* 4- *induced cramping    Ankle Plantarflexion (S1-2) 5 5     Ankle Dorsiflexion (L4-5) 5 5 RESTRICTIONS/PRECAUTIONS: none    Exercises/Interventions:   Therapeutic Exercises (22778) Resistance / level Sets/sec Reps Notes   Sci-fit   Bike #1 1.0  2.0   6 mins     IB / HR     Seated piriformis stretch       Swiss ball (red):  · Pelvic tilts: A/P, M/L, CW & CCW circles  · Alt UE flex  · March    · Mid row             2.5 pl             No UE support    Incr difficulty maintaining balance    Supine:  · PPT  · LTR  · Glute set  · March  · Bridge  · SLR  · TrA isometric      -cues to reduce ROM to R to improve pain      -decreased AROM R  -max verbal & visual cues for improved technique   Prone:  · Hip ext  · Knee flex     0#  3#          S/L: clamshells       Standing in //bars:  BOSU  · Hip flex  · Hip ext  · Hip Abd     · Mini squats     blue  blue  Blue    AirEx         7/13: done following manual             Minimal cueing for improved form    Cable column  · Side stepping   · 3-way hip    TG squats  3 10 Added 8/7   Fwd, retro, lateral walking in CC 2 pl 1 6 ea Added 8/7                        Therapeutic Activities (77297)              Education provided: pathomechanics of injury, expectations for therapy, HEP review    Pt with good understanding of all education provided                        Neuromuscular Re-ed (96789)       //bars  · Fwd step ups to SLS  · Lat step ups    BOSU  · Fwd step ups to SLS  · A/P rocking, M/L rocking   6\"  6\"        Black up    10 B  10 B        10 ea   No UE assist  No UE assist      Intermittent UE assist   SB \"squeezes\"/crunch; B UE/LE's            SLS  X 20\" 2 B           Trampoline march       [employer]: A/P balance with trunk rotations and up/down ball lift  X 10 ea  Added 8/7                 Manual Intervention (09953)         L S/L: Magic Stick  R glute med & min, TFL & IT-Band, R VLO,     incr sensitivity at R VLO   Prone: STM , R piriformis, R glute max       Prone R quad stretch  Piriformis stretch  Prone R hip flexor stretch          -pt able to perform 1 time with instruction   Prone:  STM R PSIS, along R lateral border of sacrum and adjacent R glute max     With free-up cream   IASTM in L sidelying to R lumbar paraspinals and QL  X 8 min  With free-up cream              Pt. Education:      Home Exercise Program:  Access Code: [email or web address]   URL: [email or web address]   Date: 06/23/2020   Prepared by: Bipin Class Huml     Exercises   Supine Posterior Pelvic Tilt - 10 reps - 2 sets - 5 hold - 1x daily - 7x weekly   Supine Lower Trunk Rotation - 10 reps - 2 sets - 1x daily - 7x weekly   Hooklying Gluteal Sets - 10 reps - 2 sets - 1x daily - 7x weekly   Supine March - 10 reps - 2 sets - 1x daily - 7x weekly     Therapeutic Exercise and NMR EXR  [x] (72611) Provided verbal/tactile cueing for activities related to strengthening, flexibility, endurance, ROM for improvements in  [x] LE / Lumbar: LE, proximal hip, and core control with self care, mobility, lifting, ambulation. [] UE / Cervical: cervical, postural, scapular, scapulothoracic and UE control with self care, reaching, carrying, lifting, house/yardwork, driving, computer work.  [] (20331) Provided verbal/tactile cueing for activities related to improving balance, coordination, kinesthetic sense, posture, motor skill, proprioception to assist with   [] LE / lumbar: LE, proximal hip, and core control in self care, mobility, lifting, ambulation and eccentric single leg control. [] UE / cervical: cervical, scapular, scapulothoracic and UE control with self care, reaching, carrying, lifting, house/yardwork, driving, computer work.   [] (81279) Therapist is in constant attendance of 2 or more patients providing skilled therapy interventions, but not providing any significant amount of measurable one-on-one time to either patient, for improvements in  [] LE / lumbar: LE, proximal hip, and core control in self care, mobility, lifting, ambulation and eccentric single leg control.    [] UE / cervical: cervical, scapular, postural,  scapular, scapulothoracic and UE control with self care, reaching, carrying, lifting, house/yardwork, driving, computer work    Manual Treatments:  PROM / STM / Oscillations-Mobs:  G-I, II, III, IV (PA's, Inf., Post.)  [x] (11690) Provided manual therapy to mobilize LE, proximal hip and/or LS spine soft tissue/joints for the purpose of modulating pain, promoting relaxation,  increasing ROM, reducing/eliminating soft tissue swelling/inflammation/restriction, improving soft tissue extensibility and allowing for proper ROM for normal function with   [x] LE / lumbar: self care, mobility, lifting and ambulation. [] UE / Cervical: self care, reaching, carrying, lifting, house/yardwork, driving, computer work. Modalities:  [] (63444) Vasopneumatic compression: Utilized vasopneumatic compression to decrease edema / swelling for the purpose of improving mobility and quad tone / recruitment which will allow for increased overall function including but not limited to self-care, transfers, ambulation, and ascending / descending stairs.        Modalities:    8/7, 7/29, 7/22, 7/20:  L S/L:  MHP R lateral hip and proximal IT-band x10 mins    7/15:  MHP to R low back/glute x 15 min in prone  7/13: MHP to R low back/glute x 15 min in hooklying  7/10:  Declined CP;  Did ask about BioFreeze and where to obtain, plans to purchase and use on R TFL  7/7:  Prone CP from R ASIS to L QL x15 mins    Charges:  Timed Code Treatment Minutes: 40   Total Treatment Minutes: 50     [] EVAL - LOW (48075)   [] EVAL - MOD (36591)  [] EVAL - HIGH (09006)  [] RE-EVAL (37937)  [x] SF(03243) x 1      [] Ionto  [x] NMR (63493) x  1     [] Vaso  [x] Manual (30304) x 1      [] Ultrasound  [] TA x       [] Mech Traction (05977)  [] Aquatic Therapy x      [] ES (un) (92494):   [] Home Management Training x  [] ES(attended) (34678)   [] Dry Needling 1-2 muscles (72133):  [] Dry Needling 3+ muscles (006774)  [] Group:      [] Other:     GOALS: Patient stated goal: decreased pain during functional mobility  [] Progressing: [] Met: [] Not Met: [] Adjusted     Therapist goals for Patient:   Short Term Goals: To be achieved in: 2 weeks  1. Independent in HEP and progression per patient tolerance, in order to prevent re-injury. [] Progressing: [x] Met: [] Not Met: [] Adjusted  2. Patient will have a decrease in pain to facilitate improvement in movement, function, and ADLs as indicated by Functional Deficits. [x] Progressing: [] Met: [] Not Met: [] Adjusted    Long Term Goals: To be achieved in: 8 weeks  1. Disability index score of 30% or less for the ALENA to assist with reaching prior level of function. [] Progressing: [] Met: [] Not Met: [] Adjusted  2. Patient will demonstrate increased AROM to WNL, good LS mobility, good hip ROM to allow for proper joint functioning as indicated by patients Functional Deficits. [] Progressing: [] Met: [] Not Met: [] Adjusted  3. Patient will demonstrate an increase in Strength to good proximal hip and core activation to allow for proper functional mobility as indicated by patients Functional Deficits. [] Progressing: [] Met: [] Not Met: [] Adjusted  4. Patient will return to functional activities including sitting, standing, walking without increased symptoms or restriction. [x] Progressing: [] Met: [] Not Met: [] Adjusted    Overall Progression Towards Functional goals/ Treatment Progress Update:  [] Patient is progressing as expected towards functional goals listed. [] Progression is slowed due to complexities/Impairments listed. [] Progression has been slowed due to co-morbidities.   [x] Plan just implemented, too soon to assess goals progression <30days   [] Goals require adjustment due to lack of progress  [] Patient is not progressing as expected and requires additional follow up with physician  [] Other    Persisting Functional Limitations/Impairments:  [x]Sleeping [x]Sitting               [x]Standing []Transfers        [x]Walking []Kneeling               []Stairs []Squatting / bending   []ADLs []Reaching  []Lifting  []Housework  []Driving []Job related tasks  []Sports/Recreation []Other:        ASSESSMENT:     Introduced CC walking to encourage eccentric hip control. Pt had one loss of balance episode with eccentric portion of fwd walking, requiring min A from PT to regain balance. Pt continues to demonstrate balance impairment, most significantly with SLS challenges. IASTM this visit to further address trigger points in low back. Pt continues to respond well to treatment. Continue to progress core and LE strengthening and balance as tolerated. Treatment/Activity Tolerance:  [x] Patient able to complete tx  [] Patient limited by fatigue  [] Patient limited by pain  [] Patient limited by other medical complications  [] Other:     Prognosis: [x] Good [] Fair  [] Poor    Patient Requires Follow-up: [x] Yes  [] No    Plan for next treatment session:    PLAN: See eval. PT 1-2x / week for 8 weeks. [x] Continue per plan of care [] Alter current plan (see comments)  [] Plan of care initiated [] Hold pending MD visit [] Discharge    Electronically signed by: Toya Nicolas PT, DPT    Note: If patient does not return for scheduled/ recommended follow up visits, this note will serve as a discharge from care along with most recent update on progress.

## 2020-08-12 ENCOUNTER — HOSPITAL ENCOUNTER (OUTPATIENT)
Dept: PHYSICAL THERAPY | Age: 55
Setting detail: THERAPIES SERIES
Discharge: HOME | End: 2020-08-12
Payer: COMMERCIAL

## 2020-08-12 PROCEDURE — 97112 NEUROMUSCULAR REEDUCATION: CPT

## 2020-08-12 PROCEDURE — 97110 THERAPEUTIC EXERCISES: CPT

## 2020-08-12 NOTE — FLOWSHEET NOTE
[address] [facility] Physical Therapy  Phone: [phone]   Fax: [phone]    Physical Therapy Daily Treatment Note  Date:  2020    Patient Name:  Cassy Rosa    :  1965  MRN: [id number]  Medical/Treatment Diagnosis Information:  Diagnosis: M54.5 (ICD-10-CM) - Low back pain  Treatment Diagnosis: decreased lumbar ROM, poor core activation, TTP R lumbar paraspinals, R QL, and R glute at iliac insertion, dec hip strength  Insurance/Certification information:  PT Insurance Information: [employer] 30 visits/yr  Physician Information:  Referring Practitioner: HOWARD Rodriguez CNP  Plan of care signed (Y/N): []  Yes [x]  No Sent , resent     Date of Patient follow up with Physician:      Progress Report: []  Yes  [x]  No     Date Range for reporting period:  Beginnin2020  Endin2020    Progress report due (10 Rx/or 30 days whichever is less): visit #10 or       Recertification due (POC duration/ or 90 days whichever is less):  2020     Visit # Insurance Allowable Auth required? Date Range    30 []  Yes  [x]  No Through      Latex Allergy:  [x]NO      []YES  Preferred Language for Healthcare:   []English       [x]other: Persian but prefers not to use     Functional Scale:        Date assessed:  Oswestry: raw score = 31/50; dysfunction = 62%             20     Pain level:  0/10     SUBJECTIVE:     Having a good day today, denies pain. Felt good following last session.       OBJECTIVE:   : pt 15 min late to appt    :  Strength / Myotomes LEFT RIGHT Comments   Multifidus         Transverse Ab         Hip Flexors (L1-2) 5 4     Hip Abductors 4+ 4+     Hip Extensors  4- 4-      Quads (L2-4) 4+ 4+     Hamstrings  -* 4- *induced cramping    Ankle Plantarflexion (S1-2) 5 5     Ankle Dorsiflexion (L4-5) 5 5        RESTRICTIONS/PRECAUTIONS: none    Exercises/Interventions:   Therapeutic Exercises (40165) Resistance / level Sets/sec Reps Notes   Sci-fit   Bike #1 1.0  3.0   6 mins     IB / HR     Seated piriformis stretch       Swiss ball (red):  · Pelvic tilts: A/P, M/L, CW & CCW circles  · Alt UE flex  · March    · Mid row             2.5 pl             No UE support    Incr difficulty maintaining balance    Supine:  · PPT  · LTR  · Glute set  · March  · Bridge  · SLR  · TrA isometric      -cues to reduce ROM to R to improve pain      -decreased AROM R  -max verbal & visual cues for improved technique   Prone:  · Hip ext  · Knee flex     0#  3#          S/L: clamshells       Standing in //bars:  BOSU  · Hip flex  · Hip ext  · Hip Abd     · Mini squats     blue  blue  Blue    AirEx         7/13: done following manual             Minimal cueing for improved form    Cable column  · Side stepping   · 3-way hip  · Fwd, retro, lateral walking   2.5 pl1  5 ea      Added 8/7   TG squats  3 10 Report                               Therapeutic Activities (56489)              Education provided: pathomechanics of injury, expectations for therapy, HEP review    Pt with good understanding of all education provided                        Neuromuscular Re-ed (52082)       //bars  · Fwd step ups to SLS  · Lat step ups -attempted to SLS but unable to control and had to place trail LE down for balance    BOSU  · Fwd step ups to SLS  · A/P rocking, M/L rocking   6\"  6\"            12 B  12 B           No UE assist  No UE assist      Intermittent UE assist   SB \"squeezes\"/crunch; B UE/LE's     Shuttle Balance  · Rhomberg stance  · Rhomberg w/NBOS  · Hip abd     20\"  2x20\"       15 B    SLS AirEx 2x10\" B  x15\" B  hand assist PRN only          Trampoline march       Akellaboard: A/P balance with trunk rotations and up/down ball lift  X 20 ea  Added 8/7                 Manual Intervention (22828)         L S/L: Magic Stick  R glute med & min, TFL & IT-Band, R VLO,     incr sensitivity at R VLO   Prone: STM , R piriformis, R glute max Prone R quad stretch  Piriformis stretch  Prone R hip flexor stretch          -pt able to perform 1 time with instruction   Prone:  STM R PSIS, along R lateral border of sacrum and adjacent R glute max     With free-up cream   IASTM in L sidelying to R lumbar paraspinals and QL    With free-up cream              Pt. Education:      Home Exercise Program:  Access Code: [email or web address]   URL: [email or web address]   Date: 06/23/2020   Prepared by: Brigitte Arguelles     Exercises   Supine Posterior Pelvic Tilt - 10 reps - 2 sets - 5 hold - 1x daily - 7x weekly   Supine Lower Trunk Rotation - 10 reps - 2 sets - 1x daily - 7x weekly   Hooklying Gluteal Sets - 10 reps - 2 sets - 1x daily - 7x weekly   Supine March - 10 reps - 2 sets - 1x daily - 7x weekly     Therapeutic Exercise and NMR EXR  [x] (54973) Provided verbal/tactile cueing for activities related to strengthening, flexibility, endurance, ROM for improvements in  [x] LE / Lumbar: LE, proximal hip, and core control with self care, mobility, lifting, ambulation. [] UE / Cervical: cervical, postural, scapular, scapulothoracic and UE control with self care, reaching, carrying, lifting, house/yardwork, driving, computer work.  [] (41651) Provided verbal/tactile cueing for activities related to improving balance, coordination, kinesthetic sense, posture, motor skill, proprioception to assist with   [] LE / lumbar: LE, proximal hip, and core control in self care, mobility, lifting, ambulation and eccentric single leg control.    [] UE / cervical: cervical, scapular, scapulothoracic and UE control with self care, reaching, carrying, lifting, house/yardwork, driving, computer work.   [] (64948) Therapist is in constant attendance of 2 or more patients providing skilled therapy interventions, but not providing any significant amount of measurable one-on-one time to either patient, for improvements in  [] LE / lumbar: LE, proximal hip, and core control in self care, mobility, lifting, ambulation and eccentric single leg control. [] UE / cervical: cervical, scapular, scapulothoracic and UE control with self care, reaching, carrying, lifting, house/yardwork, driving, computer work. NMR and Therapeutic Activities:    [] (85187 or 55476) Provided verbal/tactile cueing for activities related to improving balance, coordination, kinesthetic sense, posture, motor skill, proprioception and motor activation to allow for proper function of   [] LE: / Lumbar core, proximal hip and LE with self care and ADLs  [] UE / Cervical: cervical, postural, scapular, scapulothoracic and UE control with self care, carrying, lifting, driving, computer work.   [] (92144) Gait Re-education- Provided training and instruction to the patient for proper LE, core and proximal hip recruitment and positioning and eccentric body weight control with ambulation re-education including up and down stairs     Home Management Training / Self Care:  [] (70287) Provided self-care/home management training related to activities of daily living and compensatory training, and/or use of adaptive equipment for improvement with: ADLs and compensatory training, meal preparation, safety procedures and instruction in use of adaptive equipment, including bathing, grooming, dressing, personal hygiene, basic household cleaning and chores.      Home Exercise Program:    [x] (93644) Reviewed/Progressed HEP activities related to strengthening, flexibility, endurance, ROM of   [x] LE / Lumbar: core, proximal hip and LE for functional self-care, mobility, lifting and ambulation/stair navigation   [] UE / Cervical: cervical, postural, scapular, scapulothoracic and UE control with self care, reaching, carrying, lifting, house/yardwork, driving, computer work  [] (43266)Reviewed/Progressed HEP activities related to improving balance, coordination, kinesthetic sense, posture, motor skill, proprioception of   [] LE: core, proximal hip and LE for self care, mobility, lifting, and ambulation/stair navigation    [] UE / Cervical: cervical, postural,  scapular, scapulothoracic and UE control with self care, reaching, carrying, lifting, house/yardwork, driving, computer work    Manual Treatments:  PROM / STM / Oscillations-Mobs:  G-I, II, III, IV (PA's, Inf., Post.)  [x] (04168) Provided manual therapy to mobilize LE, proximal hip and/or LS spine soft tissue/joints for the purpose of modulating pain, promoting relaxation,  increasing ROM, reducing/eliminating soft tissue swelling/inflammation/restriction, improving soft tissue extensibility and allowing for proper ROM for normal function with   [x] LE / lumbar: self care, mobility, lifting and ambulation. [] UE / Cervical: self care, reaching, carrying, lifting, house/yardwork, driving, computer work. Modalities:  [] (97955) Vasopneumatic compression: Utilized vasopneumatic compression to decrease edema / swelling for the purpose of improving mobility and quad tone / recruitment which will allow for increased overall function including but not limited to self-care, transfers, ambulation, and ascending / descending stairs.        Modalities:    8/7, 7/29, 7/22, 7/20:  L S/L:  MHP R lateral hip and proximal IT-band x10 mins    7/15:  MHP to R low back/glute x 15 min in prone  7/13: MHP to R low back/glute x 15 min in hooklying  7/10:  Declined CP;  Did ask about BioFreeze and where to obtain, plans to purchase and use on R TFL  7/7:  Prone CP from R ASIS to L QL x15 mins    Charges:  Timed Code Treatment Minutes: 45   Total Treatment Minutes: 45     [] EVAL - LOW (35998)   [] EVAL - MOD (61806)  [] EVAL - HIGH (58987)  [] RE-EVAL (12086)  [x] MATTHEWS(59428) x 1      [] Ionto  [x] NMR (95210) x  2     [] Vaso  [] Manual (29905) x       [] Ultrasound  [] TA x       [] Mech Traction (75915)  [] Aquatic Therapy x      [] ES (un) (56738):   [] Home Management Training x  [] ES(attended) (05912)   [] Dry Needling 1-2 muscles (99659):  [] Dry Needling 3+ muscles (203148)  [] Group:      [] Other:     GOALS:   Patient stated goal: decreased pain during functional mobility  [] Progressing: [] Met: [] Not Met: [] Adjusted     Therapist goals for Patient:   Short Term Goals: To be achieved in: 2 weeks  1. Independent in HEP and progression per patient tolerance, in order to prevent re-injury. [] Progressing: [x] Met: [] Not Met: [] Adjusted  2. Patient will have a decrease in pain to facilitate improvement in movement, function, and ADLs as indicated by Functional Deficits. [x] Progressing: [] Met: [] Not Met: [] Adjusted    Long Term Goals: To be achieved in: 8 weeks  1. Disability index score of 30% or less for the ALENA to assist with reaching prior level of function. [] Progressing: [] Met: [] Not Met: [] Adjusted  2. Patient will demonstrate increased AROM to WNL, good LS mobility, good hip ROM to allow for proper joint functioning as indicated by patients Functional Deficits. [] Progressing: [] Met: [] Not Met: [] Adjusted  3. Patient will demonstrate an increase in Strength to good proximal hip and core activation to allow for proper functional mobility as indicated by patients Functional Deficits. [] Progressing: [] Met: [] Not Met: [] Adjusted  4. Patient will return to functional activities including sitting, standing, walking without increased symptoms or restriction. [x] Progressing: [] Met: [] Not Met: [] Adjusted    Overall Progression Towards Functional goals/ Treatment Progress Update:  [] Patient is progressing as expected towards functional goals listed. [] Progression is slowed due to complexities/Impairments listed. [] Progression has been slowed due to co-morbidities.   [x] Plan just implemented, too soon to assess goals progression <30days   [] Goals require adjustment due to lack of progress  [] Patient is not progressing as expected and requires additional follow up with physician  [] Other    Persisting Functional Limitations/Impairments:  [x]Sleeping [x]Sitting               [x]Standing []Transfers        [x]Walking []Kneeling               []Stairs []Squatting / bending   []ADLs []Reaching  []Lifting  []Housework  []Driving []Job related tasks  []Sports/Recreation []Other:        ASSESSMENT:     Patient progressing well since last session and during treatment, declined both manual treatment and MHP, preferred to continue performing exercises. Pt aware to monitor symptoms this date and notify PT at next treatment, may resume manual and MHP at end of session as necessary. Patient has steadily progressed strength and symptoms since beginning episode of care and can continue to progress to return to OF. Treatment/Activity Tolerance:  [x] Patient able to complete tx  [] Patient limited by fatigue  [] Patient limited by pain  [] Patient limited by other medical complications  [] Other:     Prognosis: [x] Good [] Fair  [] Poor    Patient Requires Follow-up: [x] Yes  [] No    Plan for next treatment session:    PLAN: See eval. PT 1-2x / week for 8 weeks. [x] Continue per plan of care [] Alter current plan (see comments)  [] Plan of care initiated [] Hold pending MD visit [] Discharge    Electronically signed by: Oracio Ojeda PT, DPT    Note: If patient does not return for scheduled/ recommended follow up visits, this note will serve as a discharge from care along with most recent update on progress.

## 2020-08-14 ENCOUNTER — HOSPITAL ENCOUNTER (OUTPATIENT)
Dept: PHYSICAL THERAPY | Age: 55
Setting detail: THERAPIES SERIES
Discharge: HOME | End: 2020-08-14
Payer: COMMERCIAL

## 2020-08-14 PROCEDURE — 97110 THERAPEUTIC EXERCISES: CPT

## 2020-08-14 PROCEDURE — 97140 MANUAL THERAPY 1/> REGIONS: CPT

## 2020-08-14 NOTE — FLOWSHEET NOTE
[address] [facility] Physical Therapy  Phone: [phone]   Fax: [phone]    Physical Therapy Daily Treatment Note  Date:  2020    Patient Name:  Sylwia Monzon    :  1965  MRN: [id number]  Medical/Treatment Diagnosis Information:  Diagnosis: M54.5 (ICD-10-CM) - Low back pain  Treatment Diagnosis: decreased lumbar ROM, poor core activation, TTP R lumbar paraspinals, R QL, and R glute at iliac insertion, dec hip strength  Insurance/Certification information:  PT Insurance Information: Tulio 30 visits/yr  Physician Information:  Referring Practitioner: HOWARD Wilkinson CNP  Plan of care signed (Y/N): []  Yes [x]  No Sent , resent     Date of Patient follow up with Physician:      Progress Report: []  Yes  [x]  No     Date Range for reporting period:  Beginnin2020  Endin2020    Progress report due (10 Rx/or 30 days whichever is less): visit #10 or 3338      Recertification due (POC duration/ or 90 days whichever is less):  2020     Visit # Insurance Allowable Auth required? Date Range    30/ []  Yes  [x]  No Through      Latex Allergy:  [x]NO      []YES  Preferred Language for Healthcare:   []English       [x]other: Greenlandic but prefers not to use     Functional Scale:        Date assessed:  Oswestry: raw score = 31/50; dysfunction = 62%             20     Pain level:  2-3/10     SUBJECTIVE:    Having just a little bit of pain in R hip abductors. [place]el good following last session. Declined increased exercise intensity this date due to elevated pain levels.       OBJECTIVE:   : pt 15 min late to appt    :  Strength / Myotomes LEFT RIGHT Comments   Multifidus         Transverse Ab         Hip Flexors (L1-2) 5 4     Hip Abductors 4+ 4+     Hip Extensors  4- 4-      Quads (L2-4) 4+ 4+     Hamstrings  -* 4- *induced cramping    Ankle Plantarflexion (S1-2) 5 5     Ankle Dorsiflexion (L4-5) 5 5 RESTRICTIONS/PRECAUTIONS: none    Exercises/Interventions:   Therapeutic Exercises (16544) Resistance / level Sets/sec Reps Notes   Sci-fit   Bike #1 1.0  3.0   5 mins     IB / HR     Seated piriformis stretch       Swiss ball (red):  · Pelvic tilts: A/P, M/L, CW & CCW circles  · Alt UE flex  · March    · Mid row             2.5 pl             No UE support    Incr difficulty maintaining balance    Supine:  · PPT  · LTR  · Glute set  · March  · Bridge  · SLR  · TrA isometric      -cues to reduce ROM to R to improve pain      -decreased AROM R  -max verbal & visual cues for improved technique   Prone:  · Hip ext  · Knee flex     0#  3#          S/L: clamshells  S/L: hip abd     10 B    Standing in //bars:  BOSU  · Hip flex  · Hip ext  · Hip Abd     · Mini squats     blue  blue  Blue    AirEx         7/13: done following manual             Minimal cueing for improved form    Cable column  · Side stepping   · 3-way hip  · Fwd, retro, lateral walking   3.0 pl1  4 ea      Added 8/7   TG squats  3 12 Report                               Therapeutic Activities (46619)              Education provided: pathomechanics of injury, expectations for therapy, HEP review    Pt with good understanding of all education provided                        Neuromuscular Re-ed (67142)       //bars  · Fwd step ups to SLS  · Lat step ups -attempted to SLS but unable to control and had to place trail LE down for balance    BOSU  · Fwd step ups to SLS  · A/P rocking, M/L rocking   6\"  6\"            12 B  12 B           No UE assist  No UE assist      Intermittent UE assist   SB \"squeezes\"/crunch; B UE/LE's     Shuttle Balance  · Rhomberg stance  · Rhomberg w/NBOS  · Hip abd             SLS AirEx  hand assist PRN only          Trampoline march       Rockerboard: A/P balance with trunk rotations and up/down ball lift    Added 8/7                 Manual Intervention (79466)         L S/L: The Stick R glute med & min, TFL, obliques at insertion  12 mins     Prone: STM , R piriformis, R glute max       Prone R quad stretch  Piriformis stretch  Prone R hip flexor stretch          -pt able to perform 1 time with instruction   Prone:  STM R PSIS, along R lateral border of sacrum and adjacent R glute max     With free-up cream   IASTM in L sidelying to R lumbar paraspinals and QL    With free-up cream              Pt. Education:      Home Exercise Program:  Access Code: [email or web address]   URL: [email or web address]   Date: 06/23/2020   Prepared by: Lakeisha Waldron Huml     Exercises   Supine Posterior Pelvic Tilt - 10 reps - 2 sets - 5 hold - 1x daily - 7x weekly   Supine Lower Trunk Rotation - 10 reps - 2 sets - 1x daily - 7x weekly   Hooklying Gluteal Sets - 10 reps - 2 sets - 1x daily - 7x weekly   Supine March - 10 reps - 2 sets - 1x daily - 7x weekly     Therapeutic Exercise and NMR EXR  [x] (02160) Provided verbal/tactile cueing for activities related to strengthening, flexibility, endurance, ROM for improvements in  [x] LE / Lumbar: LE, proximal hip, and core control with self care, mobility, lifting, ambulation. [] UE / Cervical: cervical, postural, scapular, scapulothoracic and UE control with self care, reaching, carrying, lifting, house/yardwork, driving, computer work.  [] (60713) Provided verbal/tactile cueing for activities related to improving balance, coordination, kinesthetic sense, posture, motor skill, proprioception to assist with   [] LE / lumbar: LE, proximal hip, and core control in self care, mobility, lifting, ambulation and eccentric single leg control.    [] UE / cervical: cervical, scapular, scapulothoracic and UE control with self care, reaching, carrying, lifting, house/yardwork, driving, computer work.   [] (60156) Therapist is in constant attendance of 2 or more patients providing skilled therapy interventions, but not providing any significant amount of measurable one-on-one time to either patient, for improvements in  [] LE / skill, proprioception of   [] LE: core, proximal hip and LE for self care, mobility, lifting, and ambulation/stair navigation    [] UE / Cervical: cervical, postural,  scapular, scapulothoracic and UE control with self care, reaching, carrying, lifting, house/yardwork, driving, computer work    Manual Treatments:  PROM / STM / Oscillations-Mobs:  G-I, II, III, IV (PA's, Inf., Post.)  [x] (07437) Provided manual therapy to mobilize LE, proximal hip and/or LS spine soft tissue/joints for the purpose of modulating pain, promoting relaxation,  increasing ROM, reducing/eliminating soft tissue swelling/inflammation/restriction, improving soft tissue extensibility and allowing for proper ROM for normal function with   [x] LE / lumbar: self care, mobility, lifting and ambulation. [] UE / Cervical: self care, reaching, carrying, lifting, house/yardwork, driving, computer work. Modalities:  [] (31824) Vasopneumatic compression: Utilized vasopneumatic compression to decrease edema / swelling for the purpose of improving mobility and quad tone / recruitment which will allow for increased overall function including but not limited to self-care, transfers, ambulation, and ascending / descending stairs.        Modalities:    8/7, 7/29, 7/22, 7/20:  L S/L:  MHP R lateral hip and proximal IT-band x10 mins    7/15:  MHP to R low back/glute x 15 min in prone  7/13: MHP to R low back/glute x 15 min in hooklying  7/10:  Declined CP;  Did ask about BioFreeze and where to obtain, plans to purchase and use on R TFL  7/7:  Prone CP from R ASIS to L QL x15 mins    Charges:  Timed Code Treatment Minutes: 43   Total Treatment Minutes: 43     [] EVAL - LOW (83203)   [] EVAL - MOD (25391)  [] EVAL - HIGH (46895)  [] RE-EVAL (54934)  [x] BZ(49179) x 2      [] Ionto  [] NMR (53870) x       [] Vaso  [x] Manual (53822) x 1      [] Ultrasound  [] TA x       [] Mech Traction (90136)  [] Aquatic Therapy x      [] ES () (48711):   [] Home expected and requires additional follow up with physician  [] Other    Persisting Functional Limitations/Impairments:  [x]Sleeping [x]Sitting               [x]Standing []Transfers        [x]Walking []Kneeling               []Stairs []Squatting / bending   []ADLs []Reaching  []Lifting  []Housework  []Driving []Job related tasks  []Sports/Recreation []Other:        ASSESSMENT:      R hip abd pain decreased stability with walkouts and increased while performing step-ups. STM w/the stick helped to reduce pain at end of session. Treatment/Activity Tolerance:  [x] Patient able to complete tx  [] Patient limited by fatigue  [] Patient limited by pain  [] Patient limited by other medical complications  [] Other:     Prognosis: [x] Good [] Fair  [] Poor    Patient Requires Follow-up: [x] Yes  [] No    Plan for next treatment session:    PLAN: See sahra PT 1-2x / week for 8 weeks. [x] Continue per plan of care [] Alter current plan (see comments)  [] Plan of care initiated [] Hold pending MD visit [] Discharge    Electronically signed by: Steve Robles PT, DPT    Note: If patient does not return for scheduled/ recommended follow up visits, this note will serve as a discharge from care along with most recent update on progress.

## 2020-08-21 ENCOUNTER — HOSPITAL ENCOUNTER (OUTPATIENT)
Dept: PHYSICAL THERAPY | Age: 55
Setting detail: THERAPIES SERIES
Discharge: HOME | End: 2020-08-21
Payer: COMMERCIAL

## 2020-08-21 PROCEDURE — 97110 THERAPEUTIC EXERCISES: CPT

## 2020-08-21 PROCEDURE — 97140 MANUAL THERAPY 1/> REGIONS: CPT

## 2020-08-21 PROCEDURE — 97112 NEUROMUSCULAR REEDUCATION: CPT

## 2020-08-21 NOTE — FLOWSHEET NOTE
[address] [facility] Physical Therapy  Phone: [phone]   Fax: [phone]    Physical Therapy Daily Treatment Note  Date:  2020    Patient Name:  Isha Hammond    :  1965  MRN: [id number]  Medical/Treatment Diagnosis Information:  Diagnosis: M54.5 (ICD-10-CM) - Low back pain  Treatment Diagnosis: decreased lumbar ROM, poor core activation, TTP R lumbar paraspinals, R QL, and R glute at iliac insertion, dec hip strength  Insurance/Certification information:  PT Insurance Information: [employer] 30 visits/yr  Physician Information:  Referring Practitioner: HOWARD Alfredo CNP  Plan of care signed (Y/N): []  Yes [x]  No Sent , resent     Date of Patient follow up with Physician:      Progress Report: []  Yes  [x]  No     Date Range for reporting period:  Beginnin2020  Endin2020    Progress report due (10 Rx/or 30 days whichever is less): visit #10 or       Recertification due (POC duration/ or 90 days whichever is less):  2020     Visit # Insurance Allowable Auth required? Date Range    30/yr []  Yes  [x]  No Through      Latex Allergy:  [x]NO      []YES  Preferred Language for Healthcare:   []English       [x]other: Setswana but prefers not to use     Functional Scale:        Date assessed:  Oswestry: raw score = 31/50; dysfunction = 62%             20     Pain level:  2/10     SUBJECTIVE:      Doing good today, just a little bit of pain that is about the same as last session. No real difference in pain levels following last session.   Denies 1 activity that increases her pain the most, does report that pain increases as the day goes on, and is better in the am.      OBJECTIVE:   : pt 15 min late to appt    :  Strength / Myotomes LEFT RIGHT Comments   Multifidus         Transverse Ab         Hip Flexors (L1-2) 5 4     Hip Abductors 4+ 4+     Hip Extensors  4- 4-      Quads (L2-4) 4+ 4+   Hamstrings  -* 4- *induced cramping    Ankle Plantarflexion (S1-2) 5 5     Ankle Dorsiflexion (L4-5) 5 5        RESTRICTIONS/PRECAUTIONS: none    Exercises/Interventions:   Therapeutic Exercises (02830) Resistance / level Sets/sec Reps Notes   Sci-fit   Bike #1 1.0  2.0   5 mins     IB / HR  2x30\" 2x15    Seated piriformis stretch       Swiss ball (red):  · Pelvic tilts: A/P, M/L, CW & CCW circles  · Alt UE flex  · March    · Mid row             2.5 pl             No UE support    Incr difficulty maintaining balance    Supine:  · PPT  · LTR  · Glute set  · March  · Bridge  · SLR  · TrA isometric      -cues to reduce ROM to R to improve pain      -decreased AROM R  -max verbal & visual cues for improved technique   Prone:  · Hip ext  · Knee flex     0#  3#          S/L: clamshells  S/L: hip abd         Standing in //bars:  BOSU  · Hip flex  · Hip ext  · Hip Abd     · Mini squats     blue  blue  Blue    AirEx         7/13: done following manual             Minimal cueing for improved form    Cable column  · Side stepping   · 3-way hip  · Fwd, retro, lateral walking   3.0 pl1        Added 8/7   TG squats                                 Therapeutic Activities (11418)              Education provided: pathomechanics of injury, expectations for therapy, HEP review    Pt with good understanding of all education provided                        Neuromuscular Re-ed (89670)       //bars  · Fwd step ups to SLS  · Lat step ups -attempted to SLS but unable to control and had to place trail LE down for balance  · Walking lunges   · Squats    BOSU  · Fwd step ups to SLS  · Lat step up to SLS  · A/P rocking, M/L rocking   6\"  6\"              Blue  Blue            x2 laps  15      15 B  15 B     No UE assist  No UE assist              Light UE assist   SB \"squeezes\"/crunch; B UE/LE's     Shuttle Balance  · Rhomberg stance  · Rhomberg w/NBOS  · Hip abd         15 B    SLS AirEx  hand assist PRN only          Trampoline march Rockerboard: A/P balance with trunk rotations and up/down ball lift    Added 8/7                 Manual Intervention (29198)         L S/L: The Stick R glute med & min, TFL, R glute max & lumbar paraspinals  8 mins     Prone: STM , R piriformis, R glute max       Prone R quad stretch  Piriformis stretch  Prone R hip flexor stretch          -pt able to perform 1 time with instruction   Prone:  STM R PSIS, along R lateral border of sacrum and adjacent R glute max     With free-up cream   IASTM in L sidelying to R lumbar paraspinals and QL    With free-up cream              Pt. Education:      Home Exercise Program:  Access Code: [email or web address]   URL: [email or web address]   Date: 06/23/2020   Prepared by: Rema Arguelles     Exercises   Supine Posterior Pelvic Tilt - 10 reps - 2 sets - 5 hold - 1x daily - 7x weekly   Supine Lower Trunk Rotation - 10 reps - 2 sets - 1x daily - 7x weekly   Hooklying Gluteal Sets - 10 reps - 2 sets - 1x daily - 7x weekly   Supine March - 10 reps - 2 sets - 1x daily - 7x weekly     Therapeutic Exercise and NMR EXR  [x] (95108) Provided verbal/tactile cueing for activities related to strengthening, flexibility, endurance, ROM for improvements in  [x] LE / Lumbar: LE, proximal hip, and core control with self care, mobility, lifting, ambulation. [] UE / Cervical: cervical, postural, scapular, scapulothoracic and UE control with self care, reaching, carrying, lifting, house/yardwork, driving, computer work.  [] (57018) Provided verbal/tactile cueing for activities related to improving balance, coordination, kinesthetic sense, posture, motor skill, proprioception to assist with   [] LE / lumbar: LE, proximal hip, and core control in self care, mobility, lifting, ambulation and eccentric single leg control.    [] UE / cervical: cervical, scapular, scapulothoracic and UE control with self care, reaching, carrying, lifting, house/yardwork, driving, computer work.   [] (15817) Therapist is in self care, reaching, carrying, lifting, house/yardwork, driving, computer work  [] (24080)Reviewed/Progressed HEP activities related to improving balance, coordination, kinesthetic sense, posture, motor skill, proprioception of   [] LE: core, proximal hip and LE for self care, mobility, lifting, and ambulation/stair navigation    [] UE / Cervical: cervical, postural,  scapular, scapulothoracic and UE control with self care, reaching, carrying, lifting, house/yardwork, driving, computer work    Manual Treatments:  PROM / STM / Oscillations-Mobs:  G-I, II, III, IV (PA's, Inf., Post.)  [x] (63803) Provided manual therapy to mobilize LE, proximal hip and/or LS spine soft tissue/joints for the purpose of modulating pain, promoting relaxation,  increasing ROM, reducing/eliminating soft tissue swelling/inflammation/restriction, improving soft tissue extensibility and allowing for proper ROM for normal function with   [x] LE / lumbar: self care, mobility, lifting and ambulation. [] UE / Cervical: self care, reaching, carrying, lifting, house/yardwork, driving, computer work. Modalities:  [] (45435) Vasopneumatic compression: Utilized vasopneumatic compression to decrease edema / swelling for the purpose of improving mobility and quad tone / recruitment which will allow for increased overall function including but not limited to self-care, transfers, ambulation, and ascending / descending stairs.        Modalities:    8/7, 7/29, 7/22, 7/20:  L S/L:  MHP R lateral hip and proximal IT-band x10 mins    7/15:  MHP to R low back/glute x 15 min in prone  7/13: MHP to R low back/glute x 15 min in hooklying  7/10:  Declined CP;  Did ask about BioFreeze and where to obtain, plans to purchase and use on R TFL  7/7:  Prone CP from R ASIS to L QL x15 mins    Charges:  Timed Code Treatment Minutes: 40   Total Treatment Minutes: 40     [] EVAL - LOW (51107)   [] EVAL - MOD (00078)  [] EVAL - HIGH (88937)  [] Michael Rogers (67803)  [x] UA(61281) x 1      [] Ionto  [x] NMR (02758) x 1      [] Vaso  [x] Manual (71587) x 1      [] Ultrasound  [] TA x       [] Mech Traction (71610)  [] Aquatic Therapy x      [] ES (un) (47096):   [] Home Management Training x  [] ES(attended) (19136)   [] Dry Needling 1-2 muscles (30251):  [] Dry Needling 3+ muscles (757421)  [] Group:      [] Other:     GOALS:   Patient stated goal: decreased pain during functional mobility  [] Progressing: [] Met: [] Not Met: [] Adjusted     Therapist goals for Patient:   Short Term Goals: To be achieved in: 2 weeks  1. Independent in HEP and progression per patient tolerance, in order to prevent re-injury. [] Progressing: [x] Met: [] Not Met: [] Adjusted  2. Patient will have a decrease in pain to facilitate improvement in movement, function, and ADLs as indicated by Functional Deficits. [x] Progressing: [] Met: [] Not Met: [] Adjusted    Long Term Goals: To be achieved in: 8 weeks  1. Disability index score of 30% or less for the ALENA to assist with reaching prior level of function. [] Progressing: [] Met: [] Not Met: [] Adjusted  2. Patient will demonstrate increased AROM to WNL, good LS mobility, good hip ROM to allow for proper joint functioning as indicated by patients Functional Deficits. [] Progressing: [] Met: [] Not Met: [] Adjusted  3. Patient will demonstrate an increase in Strength to good proximal hip and core activation to allow for proper functional mobility as indicated by patients Functional Deficits. [] Progressing: [] Met: [] Not Met: [] Adjusted  4. Patient will return to functional activities including sitting, standing, walking without increased symptoms or restriction. [x] Progressing: [] Met: [] Not Met: [] Adjusted    Overall Progression Towards Functional goals/ Treatment Progress Update:  [] Patient is progressing as expected towards functional goals listed. [] Progression is slowed due to complexities/Impairments listed.   [] Progression has been slowed due to co-morbidities. [x] Plan just implemented, too soon to assess goals progression <30days   [] Goals require adjustment due to lack of progress  [] Patient is not progressing as expected and requires additional follow up with physician  [] Other    Persisting Functional Limitations/Impairments:  [x]Sleeping [x]Sitting               [x]Standing []Transfers        [x]Walking []Kneeling               []Stairs []Squatting / bending   []ADLs []Reaching  []Lifting  []Housework  []Driving []Job related tasks  []Sports/Recreation []Other:        ASSESSMENT:      Patient's BLE strength and balance are steadily improving, however R hip pain persists decreasing functional activity as day progresses. Treatment/Activity Tolerance:  [x] Patient able to complete tx  [] Patient limited by fatigue  [] Patient limited by pain  [] Patient limited by other medical complications  [] Other:     Prognosis: [x] Good [] Fair  [] Poor    Patient Requires Follow-up: [x] Yes  [] No    Plan for next treatment session:    PLAN: See eval. PT 1-2x / week for 8 weeks. [x] Continue per plan of care [] Alter current plan (see comments)  [] Plan of care initiated [] Hold pending MD visit [] Discharge    Electronically signed by: Tg Abrams PT, DPT    Note: If patient does not return for scheduled/ recommended follow up visits, this note will serve as a discharge from care along with most recent update on progress.

## 2020-08-24 ENCOUNTER — HOSPITAL ENCOUNTER (OUTPATIENT)
Dept: PHYSICAL THERAPY | Age: 55
Setting detail: THERAPIES SERIES
Discharge: HOME | End: 2020-08-24
Payer: COMMERCIAL

## 2020-08-24 PROCEDURE — 97140 MANUAL THERAPY 1/> REGIONS: CPT

## 2020-08-24 PROCEDURE — 97110 THERAPEUTIC EXERCISES: CPT

## 2020-08-24 NOTE — FLOWSHEET NOTE
[address] [facility] Physical Therapy  Phone: [phone]   Fax: [phone]    Physical Therapy Daily Treatment Note  Date:  2020    Patient Name:  Omi Clifton    :  1965  MRN: [id number]  Medical/Treatment Diagnosis Information:  Diagnosis: M54.5 (ICD-10-CM) - Low back pain  Treatment Diagnosis: decreased lumbar ROM, poor core activation, TTP R lumbar paraspinals, R QL, and R glute at iliac insertion, dec hip strength  Insurance/Certification information:  PT Insurance Information: [employer] 30 visits/yr  Physician Information:  Referring Practitioner: HOWARD Balderrama CNP  Plan of care signed (Y/N): []  Yes [x]  No Sent , resent     Date of Patient follow up with Physician:      Progress Report: []  Yes  [x]  No     Date Range for reporting period:  Beginnin2020  Ending:     Progress report due (10 Rx/or 30 days whichever is less): visit #10 or 3/97/7182      Recertification due (POC duration/ or 90 days whichever is less):  2020     Visit # Insurance Allowable Auth required? Date Range    30/yr []  Yes  [x]  No Through      Latex Allergy:  [x]NO      []YES  Preferred Language for Healthcare:   []English       [x]other: Hungarian but prefers not to use     Functional Scale:        Date assessed:  Oswestry: raw score = 31/50; dysfunction = 62%             20     Pain level:  2-3/10     SUBJECTIVE:      Had a good weekend with well controlled pain levels, however pain is worse today, not intensity but is in a larger region around R lateral hip. Was fine following last session.       OBJECTIVE:   : pt 15 min late to appt    :  Strength / Myotomes LEFT RIGHT Comments   Multifidus         Transverse Ab         Hip Flexors (L1-2) 5 4     Hip Abductors 4+ 4+     Hip Extensors  4- 4-      Quads (L2-4) 4+ 4+     Hamstrings  -* 4- *induced cramping    Ankle Plantarflexion (S1-2) 5 5     Ankle Dorsiflexion (L4-5) 5 5 RESTRICTIONS/PRECAUTIONS: none    Exercises/Interventions:   Therapeutic Exercises (82901) Resistance / level Sets/sec Reps Notes   Sci-fit   Bike #1 1.0  3.0   5 mins     IB / HR  2x30\" 2x15    Seated piriformis stretch       Swiss ball (red):  · Pelvic tilts: A/P, M/L, CW & CCW circles  · Alt UE flex  · March    · Mid row             2.5 pl             No UE support    Incr difficulty maintaining balance    Supine:  · PPT  · LTR  · Glute set  · March  · Bridge  · SLR  · TrA isometric      -cues to reduce ROM to R to improve pain      -decreased AROM R  -max verbal & visual cues for improved technique   Prone:  · Hip ext  · Knee flex     0#  3#          S/L: clamshells  S/L: hip abd         Standing in //bars:  BOSU  · Hip flex  · Hip ext  · Hip Abd     · Mini squats     blue  blue  Blue    AirEx         7/13: done following manual             Minimal cueing for improved form    Cable column  · Side stepping   · 3-way hip  · Fwd, retro, lateral walking   3.0 pl1  4 ea      Added 8/7   TG squats                                 Therapeutic Activities (05531)              Education provided: pathomechanics of injury, expectations for therapy, HEP review    Pt with good understanding of all education provided                        Neuromuscular Re-ed (91149)       //bars  · Fwd step ups   · Lat step ups  · Walking lunges   · Squats    BOSU  · Fwd step ups to SLS  · Lat step up to SLS  · A/P rocking, M/L rocking   8\"  8\"          Blue  Blue      15 B  15 B             No UE assist  No UE assist              Light UE assist   SB \"squeezes\"/crunch; B UE/LE's     Shuttle Balance  · Rhomberg stance  · Rhomberg w/NBOS  · Hip abd         15 B    SLS AirEx  hand assist PRN only          Trampoline march       Rockerboard: A/P balance with trunk rotations and up/down ball lift    Added 8/7                 Manual Intervention (35329)         L S/L: The Stick R glute med & min, TFL, R glute max & lumbar paraspinals Prone: STM R lumbar paraspinals, R piriformis, R glute max  8 mins     Prone R quad stretch  Piriformis stretch  Prone R hip flexor stretch          -pt able to perform 1 time with instruction   Prone:  STM R PSIS, along R lateral border of sacrum and adjacent R glute max     With free-up cream   IASTM in L sidelying to R lumbar paraspinals and QL    With free-up cream              Pt. Education:      Home Exercise Program:  Access Code: [email or web address]   URL: [email or web address]   Date: 06/23/2020   Prepared by: Dar Arguelles     Exercises   Supine Posterior Pelvic Tilt - 10 reps - 2 sets - 5 hold - 1x daily - 7x weekly   Supine Lower Trunk Rotation - 10 reps - 2 sets - 1x daily - 7x weekly   Hooklying Gluteal Sets - 10 reps - 2 sets - 1x daily - 7x weekly   Supine March - 10 reps - 2 sets - 1x daily - 7x weekly     Therapeutic Exercise and NMR EXR  [x] (45888) Provided verbal/tactile cueing for activities related to strengthening, flexibility, endurance, ROM for improvements in  [x] LE / Lumbar: LE, proximal hip, and core control with self care, mobility, lifting, ambulation. [] UE / Cervical: cervical, postural, scapular, scapulothoracic and UE control with self care, reaching, carrying, lifting, house/yardwork, driving, computer work.  [] (03345) Provided verbal/tactile cueing for activities related to improving balance, coordination, kinesthetic sense, posture, motor skill, proprioception to assist with   [] LE / lumbar: LE, proximal hip, and core control in self care, mobility, lifting, ambulation and eccentric single leg control.    [] UE / cervical: cervical, scapular, scapulothoracic and UE control with self care, reaching, carrying, lifting, house/yardwork, driving, computer work.   [] (67492) Therapist is in constant attendance of 2 or more patients providing skilled therapy interventions, but not providing any significant amount of measurable one-on-one time to either patient, for improvements in  [] LE / lumbar: LE, proximal hip, and core control in self care, mobility, lifting, ambulation and eccentric single leg control. [] UE / cervical: cervical, scapular, scapulothoracic and UE control with self care, reaching, carrying, lifting, house/yardwork, driving, computer work. NMR and Therapeutic Activities:    [] (69339 or 68781) Provided verbal/tactile cueing for activities related to improving balance, coordination, kinesthetic sense, posture, motor skill, proprioception and motor activation to allow for proper function of   [] LE: / Lumbar core, proximal hip and LE with self care and ADLs  [] UE / Cervical: cervical, postural, scapular, scapulothoracic and UE control with self care, carrying, lifting, driving, computer work.   [] (24608) Gait Re-education- Provided training and instruction to the patient for proper LE, core and proximal hip recruitment and positioning and eccentric body weight control with ambulation re-education including up and down stairs     Home Management Training / Self Care:  [] (92150) Provided self-care/home management training related to activities of daily living and compensatory training, and/or use of adaptive equipment for improvement with: ADLs and compensatory training, meal preparation, safety procedures and instruction in use of adaptive equipment, including bathing, grooming, dressing, personal hygiene, basic household cleaning and chores.      Home Exercise Program:    [x] (19686) Reviewed/Progressed HEP activities related to strengthening, flexibility, endurance, ROM of   [x] LE / Lumbar: core, proximal hip and LE for functional self-care, mobility, lifting and ambulation/stair navigation   [] UE / Cervical: cervical, postural, scapular, scapulothoracic and UE control with self care, reaching, carrying, lifting, house/yardwork, driving, computer work  [] (15719)Reviewed/Progressed HEP activities related to improving balance, coordination, kinesthetic sense, posture, motor skill, proprioception of   [] LE: core, proximal hip and LE for self care, mobility, lifting, and ambulation/stair navigation    [] UE / Cervical: cervical, postural,  scapular, scapulothoracic and UE control with self care, reaching, carrying, lifting, house/yardwork, driving, computer work    Manual Treatments:  PROM / STM / Oscillations-Mobs:  G-I, II, III, IV (PA's, Inf., Post.)  [x] (53714) Provided manual therapy to mobilize LE, proximal hip and/or LS spine soft tissue/joints for the purpose of modulating pain, promoting relaxation,  increasing ROM, reducing/eliminating soft tissue swelling/inflammation/restriction, improving soft tissue extensibility and allowing for proper ROM for normal function with   [x] LE / lumbar: self care, mobility, lifting and ambulation. [] UE / Cervical: self care, reaching, carrying, lifting, house/yardwork, driving, computer work. Modalities:  [] (08630) Vasopneumatic compression: Utilized vasopneumatic compression to decrease edema / swelling for the purpose of improving mobility and quad tone / recruitment which will allow for increased overall function including but not limited to self-care, transfers, ambulation, and ascending / descending stairs.        Modalities:    8/24:  Pt declined MHP this date    8/7, 7/29, 7/22, 7/20:  L S/L:  MHP R lateral hip and proximal IT-band x10 mins  7/15:  MHP to R low back/glute x 15 min in prone  7/13: MHP to R low back/glute x 15 min in hooklying  7/10:  Declined CP;  Did ask about BioFreeze and where to obtain, plans to purchase and use on R TFL  7/7:  Prone CP from R ASIS to L QL x15 mins    Charges:  Timed Code Treatment Minutes: 41   Total Treatment Minutes: 41     [] EVAL - LOW (31995)   [] EVAL - MOD (13423)  [] EVAL - HIGH (95735)  [] RE-EVAL (58202)  [x] UG(82618) x 2      [] Ionto  [] NMR (98152) x       [] Vaso  [x] Manual (45970) x 1      [] Ultrasound  [] TA x       [] Mech Traction (13012)  [] Aquatic Therapy x [] ES (un) (75859):   [] Home Management Training x  [] ES(attended) (99650)   [] Dry Needling 1-2 muscles (23520):  [] Dry Needling 3+ muscles (734206)  [] Group:      [] Other:     GOALS:   Patient stated goal: decreased pain during functional mobility  [] Progressing: [] Met: [] Not Met: [] Adjusted     Therapist goals for Patient:   Short Term Goals: To be achieved in: 2 weeks  1. Independent in HEP and progression per patient tolerance, in order to prevent re-injury. [] Progressing: [x] Met: [] Not Met: [] Adjusted  2. Patient will have a decrease in pain to facilitate improvement in movement, function, and ADLs as indicated by Functional Deficits. [x] Progressing: [] Met: [] Not Met: [] Adjusted    Long Term Goals: To be achieved in: 8 weeks  1. Disability index score of 30% or less for the ALENA to assist with reaching prior level of function. [] Progressing: [] Met: [] Not Met: [] Adjusted  2. Patient will demonstrate increased AROM to WNL, good LS mobility, good hip ROM to allow for proper joint functioning as indicated by patients Functional Deficits. [] Progressing: [] Met: [] Not Met: [] Adjusted  3. Patient will demonstrate an increase in Strength to good proximal hip and core activation to allow for proper functional mobility as indicated by patients Functional Deficits. [] Progressing: [] Met: [] Not Met: [] Adjusted  4. Patient will return to functional activities including sitting, standing, walking without increased symptoms or restriction. [x] Progressing: [] Met: [] Not Met: [] Adjusted    Overall Progression Towards Functional goals/ Treatment Progress Update:  [x] Patient is progressing as expected towards functional goals listed. [] Progression is slowed due to complexities/Impairments listed. [] Progression has been slowed due to co-morbidities.   [] Plan just implemented, too soon to assess goals progression <30days   [] Goals require adjustment due to lack of progress  [] Patient is not progressing as expected and requires additional follow up with physician  [] Other    Persisting Functional Limitations/Impairments:  [x]Sleeping [x]Sitting               [x]Standing []Transfers        [x]Walking []Kneeling               []Stairs []Squatting / bending   []ADLs []Reaching  []Lifting  []Housework  []Driving []Job related tasks  []Sports/Recreation []Other:        ASSESSMENT:       Patient presents w/increased posterolateral R hip pain but did not increase during session or limit activity this date. Patient has steadily progressed throughout episode of care, improving BLE strength, balance & overall pain levels. Pain does fluctuate as pt increases activity levels at home, but pt is IND w/HEP and able to manage pain. Plan discharge after 2 more session, add to HEP as able. Treatment/Activity Tolerance:  [x] Patient able to complete tx  [] Patient limited by fatigue  [] Patient limited by pain  [] Patient limited by other medical complications  [] Other:     Prognosis: [x] Good [] Fair  [] Poor    Patient Requires Follow-up: [x] Yes  [] No    Plan for next treatment session:    PLAN: See eval. PT 1-2x / week for 8 weeks. [x] Continue per plan of care [] Alter current plan (see comments)  [] Plan of care initiated [] Hold pending MD visit [] Discharge    Electronically signed by: Steve Robles PT, DPT    Note: If patient does not return for scheduled/ recommended follow up visits, this note will serve as a discharge from care along with most recent update on progress.

## 2020-08-27 ENCOUNTER — HOSPITAL ENCOUNTER (OUTPATIENT)
Dept: PHYSICAL THERAPY | Age: 55
Setting detail: THERAPIES SERIES
Discharge: HOME | End: 2020-08-27
Payer: COMMERCIAL

## 2020-08-27 PROCEDURE — 97112 NEUROMUSCULAR REEDUCATION: CPT

## 2020-08-27 PROCEDURE — 97140 MANUAL THERAPY 1/> REGIONS: CPT

## 2020-08-27 PROCEDURE — 97110 THERAPEUTIC EXERCISES: CPT

## 2020-08-27 NOTE — FLOWSHEET NOTE
[facility] Physical Therapy  Phone: [phone]   Fax: [phone]    Physical Therapy Daily Treatment Note  Date:  2020    Patient Name:  Isha Hammond    :  1965  MRN: [id number]  Medical/Treatment Diagnosis Information:  Diagnosis: M54.5 (ICD-10-CM) - Low back pain  Treatment Diagnosis: decreased lumbar ROM, poor core activation, TTP R lumbar paraspinals, R QL, and R glute at iliac insertion, dec hip strength  Insurance/Certification information:  PT Insurance Information: [employer] 30 visits/yr  Physician Information:  Referring Practitioner: HOWARD Alfredo CNP  Plan of care signed (Y/N): []  Yes [x]  No Sent , resent     Date of Patient follow up with Physician:      Progress Report: []  Yes  [x]  No     Date Range for reporting period:  Beginnin2020  Ending:     Progress report due (10 Rx/or 30 days whichever is less): visit #10 or       Recertification due (POC duration/ or 90 days whichever is less):  2020     Visit # Insurance Allowable Auth required? Date Range   15/16 30/yr []  Yes  [x]  No Through      Latex Allergy:  [x]NO      []YES  Preferred Language for Healthcare:   []English       [x]other: Bel[place]ian but prefers not to use     Functional Scale:        Date assessed:  Oswestry: raw score = 31/50; dysfunction = 62%             20     Pain level:  2/10     SUBJECTIVE:  Felt better following last session, didn't have any pain yesterday and had no pain again this am but has slowly returned.       OBJECTIVE:   : pt 15 min late to appt    :  Strength / Myotomes LEFT RIGHT Comments   Multifidus         Transverse Ab         Hip Flexors (L1-2) 5 4     Hip Abductors 4+ 4+     Hip Extensors  4- 4-      Quads (L2-4) 4+ 4+     Hamstrings  -* 4- *induced cramping    Ankle Plantarflexion (S1-2) 5 5     Ankle Dorsiflexion (L4-5) 5 5        RESTRICTIONS/PRECAUTIONS: none    Exercises/Interventions:   Therapeutic Exercises (35245) Resistance / level Sets/sec Reps Notes   Sci-fit   Bike #1 1.0  3.0   5 mins        Seated piriformis stretch       Swiss ball (red):  · Pelvic tilts: A/P, M/L, CW & CCW circles  · Alt UE flex  · March    · Mid row             2.5 pl             No UE support    Incr difficulty maintaining balance    Supine:  · PPT  · LTR  · Glute set  · March  · Bridge  · SLR  · TrA isometric      -cues to reduce ROM to R to improve pain      -decreased AROM R  -max verbal & visual cues for improved technique   Prone:  · Hip ext  · Knee flex     0#  3#          S/L: clamshells  S/L: hip abd         Standing in //bars:  BOSU  · Hip flex  · Hip ext  · Hip Abd   · Mini squats    Airex  - SLS R only  - 3 way hip bilat  - hip ABD bilat     blue  blue  Blue  black        15      5x 30 sec  2 x 10  2 x 10 7/13: done following manual             Minimal cueing for improved form     8/27 - performed with two finger UE A   Cable column  · Side stepping   · 3-way hip  · Fwd, retro, lateral walking   3.0 pl1        Added 8/7   TG squats                                 Therapeutic Activities (68045)              Education provided: pathomechanics of injury, expectations for therapy, HEP review    Pt with good understanding of all education provided                        Neuromuscular Re-ed (35148)       //bars  · Fwd step ups to SLS  · Lat step ups  · Walking lunges   · Squats    BOSU  · Fwd step ups to SLS  · Lat step up to SLS  · A/P rocking, M/L rocking     6\"  6\"          Blue  Blue                        2x10 R lead  2x10 R lead             No UE assist  No UE assist              Light UE assist   SB \"squeezes\"/crunch; B UE/LE's     Shuttle Balance  · Rhomberg stance  · Rhomberg w/NBOS  · Hip abd            SLS AirEx          Trampoline march      Rockerboard: A/P balance with trunk rotations and up/down ball lift                    Manual Intervention (61159)         L S/L: significant amount of measurable one-on-one time to either patient, for improvements in  [] LE / lumbar: LE, proximal hip, and core control in self care, mobility, lifting, ambulation and eccentric single leg control. [] UE / cervical: cervical, scapular, scapulothoracic and UE control with self care, reaching, carrying, lifting, house/yardwork, driving, computer work. NMR and Therapeutic Activities:    [] (74752 or 76118) Provided verbal/tactile cueing for activities related to improving balance, coordination, kinesthetic sense, posture, motor skill, proprioception and motor activation to allow for proper function of   [] LE: / Lumbar core, proximal hip and LE with self care and ADLs  [] UE / Cervical: cervical, postural, scapular, scapulothoracic and UE control with self care, carrying, lifting, driving, computer work.   [] (07423) Gait Re-education- Provided training and instruction to the patient for proper LE, core and proximal hip recruitment and positioning and eccentric body weight control with ambulation re-education including up and down stairs     Home Management Training / Self Care:  [] (63020) Provided self-care/home management training related to activities of daily living and compensatory training, and/or use of adaptive equipment for improvement with: ADLs and compensatory training, meal preparation, safety procedures and instruction in use of adaptive equipment, including bathing, grooming, dressing, personal hygiene, basic household cleaning and chores.      Home Exercise Program:    [] (82300) Reviewed/Progressed HEP activities related to strengthening, flexibility, endurance, ROM of   [] LE / Lumbar: core, proximal hip and LE for functional self-care, mobility, lifting and ambulation/stair navigation   [] UE / Cervical: cervical, postural, scapular, scapulothoracic and UE control with self care, reaching, carrying, lifting, house/yardwork, driving, computer work  [] (40121)Reviewed/Progressed HEP activities related to improving balance, coordination, kinesthetic sense, posture, motor skill, proprioception of   [] LE: core, proximal hip and LE for self care, mobility, lifting, and ambulation/stair navigation    [] UE / Cervical: cervical, postural,  scapular, scapulothoracic and UE control with self care, reaching, carrying, lifting, house/yardwork, driving, computer work    Manual Treatments:  PROM / STM / Oscillations-Mobs:  G-I, II, III, IV (PA's, Inf., Post.)  [x] (37901) Provided manual therapy to mobilize LE, proximal hip and/or LS spine soft tissue/joints for the purpose of modulating pain, promoting relaxation,  increasing ROM, reducing/eliminating soft tissue swelling/inflammation/restriction, improving soft tissue extensibility and allowing for proper ROM for normal function with   [x] LE / lumbar: self care, mobility, lifting and ambulation. [] UE / Cervical: self care, reaching, carrying, lifting, house/yardwork, driving, computer work. Modalities:  [] (76477) Vasopneumatic compression: Utilized vasopneumatic compression to decrease edema / swelling for the purpose of improving mobility and quad tone / recruitment which will allow for increased overall function including but not limited to self-care, transfers, ambulation, and ascending / descending stairs.        Modalities:    8/24:  Pt declined MHP this date    8/7, 7/29, 7/22, 7/20:  L S/L:  MHP R lateral hip and proximal IT-band x10 mins  7/15:  MHP to R low back/glute x 15 min in prone  7/13: MHP to R low back/glute x 15 min in hooklying  7/10:  Declined CP;  Did ask about BioFreeze and where to obtain, plans to purchase and use on R TFL  7/7:  Prone CP from R ASIS to L QL x15 mins    Charges:  Timed Code Treatment Minutes: 47   Total Treatment Minutes: 47     [] EVAL - LOW (58957)   [] EVAL - MOD (07222)  [] EVAL - HIGH (90931)  [] RE-EVAL (63389)  [x] (59682) x 1      [] Ionto  [x] NMR (03511) x 1 [] Vaso  [x] Manual (67113) x 1      [] Ultrasound  [] TA x       [] Mech Traction (84327)  [] Aquatic Therapy x      [] ES (un) (31453):   [] Home Management Training x  [] ES(attended) (49319)   [] Dry Needling 1-2 muscles (50725):  [] Dry Needling 3+ muscles (007612)  [] Group:      [] Other:     GOALS:   Patient stated goal: decreased pain during functional mobility  [] Progressing: [] Met: [] Not Met: [] Adjusted     Therapist goals for Patient:   Short Term Goals: To be achieved in: 2 weeks  1. Independent in HEP and progression per patient tolerance, in order to prevent re-injury. [] Progressing: [x] Met: [] Not Met: [] Adjusted  2. Patient will have a decrease in pain to facilitate improvement in movement, function, and ADLs as indicated by Functional Deficits. [x] Progressing: [] Met: [] Not Met: [] Adjusted    Long Term Goals: To be achieved in: 8 weeks  1. Disability index score of 30% or less for the ALENA to assist with reaching prior level of function. [] Progressing: [] Met: [] Not Met: [] Adjusted  2. Patient will demonstrate increased AROM to WNL, good LS mobility, good hip ROM to allow for proper joint functioning as indicated by patients Functional Deficits. [] Progressing: [] Met: [] Not Met: [] Adjusted  3. Patient will demonstrate an increase in Strength to good proximal hip and core activation to allow for proper functional mobility as indicated by patients Functional Deficits. [] Progressing: [] Met: [] Not Met: [] Adjusted  4. Patient will return to functional activities including sitting, standing, walking without increased symptoms or restriction. [x] Progressing: [] Met: [] Not Met: [] Adjusted    Overall Progression Towards Functional goals/ Treatment Progress Update:  [x] Patient is progressing as expected towards functional goals listed. [] Progression is slowed due to complexities/Impairments listed. [] Progression has been slowed due to co-morbidities.   [] Plan just implemented, too soon to assess goals progression <30days   [] Goals require adjustment due to lack of progress  [] Patient is not progressing as expected and requires additional follow up with physician  [] Other    Persisting Functional Limitations/Impairments:  [x]Sleeping [x]Sitting               [x]Standing []Transfers        [x]Walking []Kneeling               []Stairs []Squatting / bending   []ADLs []Reaching  []Lifting  []Housework  []Driving []Job related tasks  []Sports/Recreation []Other:        ASSESSMENT: Pt tolerated therapy well today with focus on strengthening LE while ambulating stairs based on reports of difficulty with this at home. She was able to complete exercises with consistent VC for correct form (e.g. keeping the toes facing straight instead of turning into hip ER). She had no difficulty with completing step up onto 6 inches, which she reports is closest to the size of steps at home, but did report some pain in lat R hip during the second set of 10 reps. BOSU ball squats reproduced some more hip pain, but pt was able to complete exercise. Next visit will be the final visit with re-assessment for possible discharge to [facility]. Treatment/Activity Tolerance:  [x] Patient able to complete tx  [] Patient limited by fatigue  [] Patient limited by pain  [] Patient limited by other medical complications  [] Other:     Prognosis: [x] Good [] Fair  [] Poor    Patient Requires Follow-up: [x] Yes  [] No    Plan for next treatment session:    PLAN: See eval. PT 1-2x / week for 8 weeks. [x] Continue per plan of care [] Alter current plan (see comments)  [] Plan of care initiated [] Hold pending MD visit [] Discharge    Electronically signed by: Vj Dominguez PT, DPT      Note: If patient does not return for scheduled/ recommended follow up visits, this note will serve as a discharge from care along with most recent update on progress.

## 2020-09-01 ENCOUNTER — HOSPITAL ENCOUNTER (OUTPATIENT)
Dept: PHYSICAL THERAPY | Age: 55
Setting detail: THERAPIES SERIES
Discharge: HOME | End: 2020-09-01
Payer: COMMERCIAL

## 2020-09-01 PROCEDURE — 97110 THERAPEUTIC EXERCISES: CPT

## 2020-09-01 PROCEDURE — 97530 THERAPEUTIC ACTIVITIES: CPT

## 2020-09-01 NOTE — FLOWSHEET NOTE
[facility] Physical Therapy  Phone: [phone]   Fax: [phone]     Physical Therapy Discharge Summary    Dear HOWARD Campos CNP,    We had the pleasure of treating the following patient for physical therapy services at [address] [place] [address]. A summary of our findings can be found in the discharge summary below. If you have any questions or concerns regarding these findings, please do not hesitate to contact me at the office phone number checked above. Thank you for the referral.     Physician Signature:________________________________Date:__________________  By signing above (or electronic signature), therapists plan is approved by physician      ROM   Comments   Lumbar Flex WFL    Lumbar Ext WFL       ROM LEFT RIGHT Comments   Lumbar Side [facility] WFL*incr pain    Lumbar Rotation [facility] WFL    Hamstring Flex 20 deg 18 deg     Piriformis [facility]                        Strength / Myotomes LEFT RIGHT Comments   Multifidus         Transverse Ab         Hip Flexors (L1-2) 5 4     Hip Abductors 4+ 4+     Hip Extensors  4- 4-      Quads (L2-4) 5 5     Hamstrings  5 5        Overall Response to Treatment:   []Patient is responding well to treatment and improvement is noted with regards  to goals   []Patient should continue to improve in reasonable time if they continue HEP   []Patient has plateaued and is no longer responding to skilled PT intervention    []Patient is getting worse and would benefit from return to referring MD   []Patient unable to adhere to initial POC   [x]Other:   Patient steadily progressed throughout episode of care w/improved pain levels that remained intermittently present throughout POC. Pt's BLE strength and activity tolerance steadily improved along with balance & gait, pt is IND w/HEP and able to continued to perform to further progress pain levels. Pt is now discharged from PT services.      Date range of Visits: 6/23 - 2020  Total Visits:     Recommendation:    [x] Discharge to [facility]. Follow up with PT PRN. Physical Therapy Daily Treatment Note  Date:  2020    Patient Name:  Isha Hammond    :  1965  MRN: [id number]  Medical/Treatment Diagnosis Information:  Diagnosis: M54.5 (ICD-10-CM) - Low back pain  Treatment Diagnosis: decreased lumbar ROM, poor core activation, TTP R lumbar paraspinals, R QL, and R glute at iliac insertion, dec hip strength  Insurance/Certification information:  PT Insurance Information: [employer] 30 visits/yr  Physician Information:  Referring Practitioner: HOWARD Alfredo CNP  Plan of care signed (Y/N): []  Yes [x]  No Sent , resent     Date of Patient follow up with Physician:      Progress Report: [x]  Yes  []  No     Date Range for reporting period:  Beginnin2020  Endin2020    Progress report due (10 Rx/or 30 days whichever is less): visit #10 or 3/33/1417      Recertification due (POC duration/ or 90 days whichever is less):  2020     Visit # Insurance Allowable Auth required? Date Range    30 []  Yes  [x]  No Through      Latex Allergy:  [x]NO      []YES  Preferred Language for Healthcare:   []English       [x]other: Greek but prefers not to use     Functional Scale:        Date assessed:  Oswestry: raw score = 31/50; dysfunction = 62%             20   ALENA --unable to assess due to pt's inability to ready english and no  present           Pain level:  1-2/10     SUBJECTIVE:    Pain is doing pretty good today, just a little bit of pain. Pain typically doesn't stop her from performing tasks but may have increased pain the following day.       OBJECTIVE:   :   Pt 10 mins late  ROM   Comments   Lumbar Flex WFL    Lumbar Ext WFL       ROM LEFT RIGHT Comments   Lumbar Side [place] [facility] WF*incr pain    Lumbar Rotation [facility] WF    Hamstring Flex 20 deg 18 deg     Piriformis [facility] WF                 Strength / Myotomes LEFT RIGHT Comments   Multifidus         Transverse Ab         Hip Flexors (L1-2) 5 4     Hip Abductors 4+ 4+     Hip Extensors  4- 4-      Quads (L2-4) 5 5     Hamstrings  5 5          6/20: pt 15 min late to appt    7/22:   Strength / Myotomes LEFT RIGHT Comments   Multifidus         Transverse Ab         Hip Flexors (L1-2) 5 4     Hip Abductors 4+ 4+     Hip Extensors  4- 4-      Quads (L2-4) 4+ 4+     Hamstrings  -* 4- *induced cramping    Ankle Plantarflexion (S1-2) 5 5     Ankle Dorsiflexion (L4-5) 5 5        RESTRICTIONS/PRECAUTIONS: none    Exercises/Interventions:   Therapeutic Exercises (58132) Resistance / level Sets/sec Reps Notes   Sci-fit   Bike #1 1.0  3.0   5 mins        Seated piriformis stretch       Swiss ball (red):  · Pelvic tilts: A/P, M/L, CW & CCW circles  · Alt UE flex  · March    · Mid row             2.5 pl             No UE support    Incr difficulty maintaining balance    Supine:  · PPT  · LTR  · Glute set  · March  · Bridge  · SLR  · TrA isometric      -cues to reduce ROM to R to improve pain      -decreased AROM R  -max verbal & visual cues for improved technique   Prone:  · Hip ext  · Knee flex     0#  3#          S/L: clamshells  S/L: hip abd         Standing in //bars:  BOSU  · Hip flex  · Hip ext  · Hip Abd   · Mini squats    Airex  - SLS R only  - 3 way hip bilat  - hip ABD bilat     blue  blue  Blue  black               7/13: done following manual             Minimal cueing for improved form     8/27 - performed with two finger UE A   Cable column  · Side stepping   · 3-way hip  · Fwd, retro, lateral walking   3.0 pl1  4 ea         TG squats                                 Therapeutic Activities (28811)              Education provided: pathomechanics of injury, expectations for therapy, HEP review    Pt with good understanding of all education provided                        Neuromuscular Re-ed (36130)       //bars  · Fwd step ups to SLS  · Lat step ups  · Walking lunges   · Squats    BOSU  · Fwd step ups to SLS  · Lat step up to SLS  · A/P rocking, M/L rocking     6\"  6\"          Blue  Blue                                     No UE assist  No UE assist              Light UE assist   SB \"squeezes\"/crunch; B UE/LE's     Shuttle Balance  · Rhomberg stance  · Rhomberg w/NBOS  · Hip abd   · Fwd step-up to SLS        15 B  10 B   SLS AirEx          [employer]: A/P balance with trunk rotations and up/down ball lift                    Manual Intervention (52546)         L S/L: The Stick R glute med & min, TFL, R glute max & lumbar paraspinals       Prone: IASTM R lumbar paraspinals, R piriformis, R glute max  -blue massage tool       Prone R quad stretch  Piriformis stretch  Prone R hip flexor stretch          -pt able to perform 1 time with instruction   Prone:  STM R PSIS, along R lateral border of sacrum and adjacent R glute max     With free-up cream   IASTM in L sidelying to R lumbar paraspinals and QL    With free-up cream              Pt. Education:      Home Exercise Program:  Access Code: [email or web address]   URL: [email or web address]   Date: 06/23/2020   Prepared by: Elizabeth Arguelles     Exercises   Supine Posterior Pelvic Tilt - 10 reps - 2 sets - 5 hold - 1x daily - 7x weekly   Supine Lower Trunk Rotation - 10 reps - 2 sets - 1x daily - 7x weekly   Hooklying Gluteal Sets - 10 reps - 2 sets - 1x daily - 7x weekly   Supine March - 10 reps - 2 sets - 1x daily - 7x weekly     Therapeutic Exercise and NMR EXR  [x] (90441) Provided verbal/tactile cueing for activities related to strengthening, flexibility, endurance, ROM for improvements in  [x] LE / Lumbar: LE, proximal hip, and core control with self care, mobility, lifting, ambulation.   [] UE / Cervical: cervical, postural, scapular, scapulothoracic and UE control with self care, reaching, carrying, lifting, house/yardwork, driving, computer work.  [] (92219) Provided verbal/tactile cueing for activities related to improving balance, coordination, kinesthetic sense, posture, motor skill, proprioception to assist with   [] LE / lumbar: LE, proximal hip, and core control in self care, mobility, lifting, ambulation and eccentric single leg control. [] UE / cervical: cervical, scapular, scapulothoracic and UE control with self care, reaching, carrying, lifting, house/yardwork, driving, computer work.   [] (06736) Therapist is in constant attendance of 2 or more patients providing skilled therapy interventions, but not providing any significant amount of measurable one-on-one time to either patient, for improvements in  [] LE / lumbar: LE, proximal hip, and core control in self care, mobility, lifting, ambulation and eccentric single leg control. [] UE / cervical: cervical, scapular, scapulothoracic and UE control with self care, reaching, carrying, lifting, house/yardwork, driving, computer work.      NMR and Therapeutic Activities:    [] (07344 or 52863) Provided verbal/tactile cueing for activities related to improving balance, coordination, kinesthetic sense, posture, motor skill, proprioception and motor activation to allow for proper function of   [] LE: / Lumbar core, proximal hip and LE with self care and ADLs  [] UE / Cervical: cervical, postural, scapular, scapulothoracic and UE control with self care, carrying, lifting, driving, computer work.   [] (70697) Gait Re-education- Provided training and instruction to the patient for proper LE, core and proximal hip recruitment and positioning and eccentric body weight control with ambulation re-education including up and down stairs     Home Management Training / Self Care:  [] (60607) Provided self-care/home management training related to activities of daily living and compensatory training, and/or use of adaptive equipment for improvement with: ADLs and compensatory training, meal preparation, safety procedures and instruction in use of adaptive equipment, including bathing, grooming, dressing, personal hygiene, basic household cleaning and chores. Home Exercise Program:    [] (91676) Reviewed/Progressed HEP activities related to strengthening, flexibility, endurance, ROM of   [] LE / Lumbar: core, proximal hip and LE for functional self-care, mobility, lifting and ambulation/stair navigation   [] UE / Cervical: cervical, postural, scapular, scapulothoracic and UE control with self care, reaching, carrying, lifting, house/yardwork, driving, computer work  [] (42445)Reviewed/Progressed HEP activities related to improving balance, coordination, kinesthetic sense, posture, motor skill, proprioception of   [] LE: core, proximal hip and LE for self care, mobility, lifting, and ambulation/stair navigation    [] UE / Cervical: cervical, postural,  scapular, scapulothoracic and UE control with self care, reaching, carrying, lifting, house/yardwork, driving, computer work    Manual Treatments:  PROM / STM / Oscillations-Mobs:  G-I, II, III, IV (PA's, Inf., Post.)  [x] (23159) Provided manual therapy to mobilize LE, proximal hip and/or LS spine soft tissue/joints for the purpose of modulating pain, promoting relaxation,  increasing ROM, reducing/eliminating soft tissue swelling/inflammation/restriction, improving soft tissue extensibility and allowing for proper ROM for normal function with   [x] LE / lumbar: self care, mobility, lifting and ambulation. [] UE / Cervical: self care, reaching, carrying, lifting, house/yardwork, driving, computer work. Modalities:  [] (10958) Vasopneumatic compression: Utilized vasopneumatic compression to decrease edema / swelling for the purpose of improving mobility and quad tone / recruitment which will allow for increased overall function including but not limited to self-care, transfers, ambulation, and ascending / descending stairs.        Modalities:    8/24:  Pt declined MHP this date    8/7, 7/29, 7/22, 7/20:  L S/L: MHP R lateral hip and proximal IT-band x10 mins  7/15:  MHP to R low back/glute x 15 min in prone  7/13: MHP to R low back/glute x 15 min in hooklying  7/10:  Declined CP;  Did ask about BioFreeze and where to obtain, plans to purchase and use on R TFL  7/7:  Prone CP from R ASIS to L QL x15 mins    Charges:  Timed Code Treatment Minutes: 33   Total Treatment Minutes: 33     [] EVAL - LOW (46814)   [] EVAL - MOD (59816)  [] EVAL - HIGH (82191)  [] RE-EVAL (01253)  [x] XA(07327) x 1      [] Ionto  [] NMR (46049) x 1      [] Vaso  [] Manual (98985) x 1      [] Ultrasound  [x] TA x  1     [] Mech Traction (18145)  [] Aquatic Therapy x      [] ES (un) (67818):   [] Home Management Training x  [] ES(attended) (81217)   [] Dry Needling 1-2 muscles (81934):  [] Dry Needling 3+ muscles (618643)  [] Group:      [] Other:     GOALS:   Patient stated goal: decreased pain during functional mobility  [] Progressing: [] Met: [] Not Met: [] Adjusted     Therapist goals for Patient:   Short Term Goals: To be achieved in: 2 weeks  1. Independent in HEP and progression per patient tolerance, in order to prevent re-injury. [] Progressing: [x] Met: [] Not Met: [] Adjusted  2. Patient will have a decrease in pain to facilitate improvement in movement, function, and ADLs as indicated by Functional Deficits. [] Progressing: [x] Met: [] Not Met: [] Adjusted    Long Term Goals: To be achieved in: 8 weeks  1. Disability index score of 30% or less for the ALENA to assist with reaching prior level of function. [] Progressing: [] Met: [] Not Met: [] Adjusted  2. Patient will demonstrate increased AROM to WNL, good LS mobility, good hip ROM to allow for proper joint functioning as indicated by patients Functional Deficits. [] Progressing: [] Met: [] Not Met: [] Adjusted  3.  Patient will demonstrate an increase in Strength to good proximal hip and core activation to allow for proper functional mobility as indicated by patients Functional Deficits. [] Progressing: [] Met: [] Not Met: [] Adjusted  4. Patient will return to functional activities including sitting, standing, walking without increased symptoms or restriction. [x] Progressing: [] Met: [] Not Met: [] Adjusted    Overall Progression Towards Functional goals/ Treatment Progress Update:  [x] Patient is progressing as expected towards functional goals listed. [] Progression is slowed due to complexities/Impairments listed. [] Progression has been slowed due to co-morbidities. [] Plan just implemented, too soon to assess goals progression <30days   [] Goals require adjustment due to lack of progress  [] Patient is not progressing as expected and requires additional follow up with physician  [] Other    Persisting Functional Limitations/Impairments:  [x]Sleeping [x]Sitting               [x]Standing []Transfers        [x]Walking []Kneeling               []Stairs []Squatting / bending   []ADLs []Reaching  []Lifting  []Housework  []Driving []Job related tasks  []Sports/Recreation []Other:        ASSESSMENT:     Patient steadily progressed throughout episode of care w/improved pain levels that remained intermittently present throughout POC. Pt's BLE strength and activity tolerance steadily improved along with balance & gait, pt is IND w/HEP and able to continued to perform to further progress pain levels. Pt is now discharged from PT services. Treatment/Activity Tolerance:  [x] Patient able to complete tx  [] Patient limited by fatigue  [] Patient limited by pain  [] Patient limited by other medical complications  [] Other:     Prognosis: [x] Good [] Fair  [] Poor    Patient Requires Follow-up: [x] Yes  [] No    Plan for next treatment session:    PLAN: See eval. PT 1-2x / week for 8 weeks.    [] Continue per plan of care [] Alter current plan (see comments)  [] Plan of care initiated [] Hold pending MD visit [x] Discharge    Electronically signed by: Macy Aparicio PT,

## 2020-11-26 PROCEDURE — 99283 EMERGENCY DEPT VISIT LOW MDM: CPT

## 2020-11-26 PROCEDURE — 96374 THER/PROPH/DIAG INJ IV PUSH: CPT

## 2020-11-26 PROCEDURE — 96375 TX/PRO/DX INJ NEW DRUG ADDON: CPT

## 2020-11-26 PROCEDURE — 96372 THER/PROPH/DIAG INJ SC/IM: CPT

## 2020-11-26 ASSESSMENT — PAIN SCALES - WONG BAKER: WONGBAKER_NUMERICALRESPONSE: 2

## 2020-11-27 ENCOUNTER — HOSPITAL ENCOUNTER (EMERGENCY)
Age: 55
Discharge: HOME | End: 2020-11-27
Attending: EMERGENCY MEDICINE
Payer: COMMERCIAL

## 2020-11-27 VITALS
SYSTOLIC BLOOD PRESSURE: 116 MMHG | BODY MASS INDEX: 29.45 KG/M2 | HEART RATE: 89 BPM | TEMPERATURE: 98.5 F | HEIGHT: 60 IN | OXYGEN SATURATION: 96 % | DIASTOLIC BLOOD PRESSURE: 52 MMHG | WEIGHT: 150 LBS | RESPIRATION RATE: 16 BRPM

## 2020-11-27 PROCEDURE — 96372 THER/PROPH/DIAG INJ SC/IM: CPT

## 2020-11-27 PROCEDURE — 2500000003 HC RX 250 WO HCPCS: Performed by: EMERGENCY MEDICINE

## 2020-11-27 PROCEDURE — 96375 TX/PRO/DX INJ NEW DRUG ADDON: CPT

## 2020-11-27 PROCEDURE — 6360000002 HC RX W HCPCS: Performed by: EMERGENCY MEDICINE

## 2020-11-27 PROCEDURE — 96374 THER/PROPH/DIAG INJ IV PUSH: CPT

## 2020-11-27 RX ORDER — DIPHENHYDRAMINE HYDROCHLORIDE 50 MG/ML
25 INJECTION INTRAMUSCULAR; INTRAVENOUS ONCE
Status: COMPLETED | OUTPATIENT
Start: 2020-11-27 | End: 2020-11-27

## 2020-11-27 RX ORDER — DEXAMETHASONE SODIUM PHOSPHATE 10 MG/ML
10 INJECTION, SOLUTION INTRAMUSCULAR; INTRAVENOUS ONCE
Status: COMPLETED | OUTPATIENT
Start: 2020-11-27 | End: 2020-11-27

## 2020-11-27 RX ORDER — METHYLPREDNISOLONE 4 MG/1
TABLET ORAL
Qty: 1 KIT | Refills: 0 | Status: SHIPPED | OUTPATIENT
Start: 2020-11-27 | End: 2020-12-03

## 2020-11-27 RX ADMIN — EPINEPHRINE 0.3 MG: 1 INJECTION INTRAMUSCULAR; INTRAVENOUS; SUBCUTANEOUS at 02:30

## 2020-11-27 RX ADMIN — DEXAMETHASONE SODIUM PHOSPHATE 10 MG: 10 INJECTION, SOLUTION INTRAMUSCULAR; INTRAVENOUS at 02:39

## 2020-11-27 RX ADMIN — FAMOTIDINE 20 MG: 10 INJECTION, SOLUTION INTRAVENOUS at 02:37

## 2020-11-27 RX ADMIN — DIPHENHYDRAMINE HYDROCHLORIDE 25 MG: 50 INJECTION, SOLUTION INTRAMUSCULAR; INTRAVENOUS at 02:36

## 2020-11-27 NOTE — ED NOTES
Pt presents with swollen lips x tonight - stated \"allergy\". Speech clear. Respirations even and unlabored. No drooling noted. Saline lock inserted with meds given per orders. Call bell in reach. VSS.      Maykel Guillen RN  11/27/20 0552

## 2020-11-27 NOTE — ED PROVIDER NOTES
CHIEF COMPLAINT  Facial Swelling (Pt states about 30 minutes ago tongue and lips started to swell, pt states been having allergies last 2-3 months but never had lips or tongues involved, states hard to talk, no difficulty breathing)      HISTORY OF PRESENT ILLNESS  Isaiah Gonzalez is a 48 y.o. female who presents to the ED with report of feeling like her tongue and lips is started to swell about 30 minutes prior to arrival.  She has known history of allergies and is dealing with environmental allergies the past 2 to 3 months but states she has never had swelling in her mouth. She feels it is hard to talk but denies any difficulty breathing or swallowing no short of breath or chest pain. No headache or dizziness. Did not take anything for the symptoms prior to arrival nothing makes better or worse. No other complaints, modifying factors or associated symptoms. I have reviewed the following from the nursing documentation. History reviewed. No pertinent past medical history. History reviewed. No pertinent surgical history. History reviewed. No pertinent family history.   Social History     Socioeconomic History    Marital status:      Spouse name: Not on file    Number of children: Not on file    Years of education: Not on file    Highest education level: Not on file   Occupational History    Not on file   Social Needs    Financial resource strain: Not on file    Food insecurity     Worry: Not on file     Inability: Not on file    Transportation needs     Medical: Not on file     Non-medical: Not on file   Tobacco Use    Smoking status: Never Smoker    Smokeless tobacco: Never Used   Substance and Sexual Activity    Alcohol use: No    Drug use: Never    Sexual activity: Not on file   Lifestyle    Physical activity     Days per week: Not on file     Minutes per session: Not on file    Stress: Not on file   Relationships    Social connections     Talks on phone: Not on file     Gets together: Not on file     Attends Oriental orthodox service: Not on file     Active member of club or organization: Not on file     Attends meetings of clubs or organizations: Not on file     Relationship status: Not on file    Intimate partner violence     Fear of current or ex partner: Not on file     Emotionally abused: Not on file     Physically abused: Not on file     Forced sexual activity: Not on file   Other Topics Concern    Not on file   Social History Narrative    Not on file     Current Facility-Administered Medications   Medication Dose Route Frequency Provider Last Rate Last Dose    diphenhydrAMINE (BENADRYL) injection 25 mg  25 mg Intravenous Once Woody Ao V, DO        dexamethasone (PF) (DECADRON) injection 10 mg  10 mg Intravenous Once Woody Ao V, DO        famotidine (PEPCID) injection 20 mg  20 mg Intravenous Once Woody Ao V, DO        EPINEPHrine 1 MG/ML injection 0.3 mg  0.3 mg Intramuscular Once Caleen Klarissa, DO         Current Outpatient Medications   Medication Sig Dispense Refill    naproxen (NAPROSYN) 500 MG tablet Take 1 tablet by mouth 2 times daily for 20 doses 20 tablet 0    lidocaine (LIDODERM) 5 % Place 1 patch onto the skin daily 12 hours on, 12 hours off. 30 patch 0    loratadine (CLARITIN) 10 MG tablet Take 1 tablet by mouth daily 30 tablet 0    fluticasone (FLONASE) 50 MCG/ACT nasal spray 1 spray by Nasal route daily 1 Bottle 0    ibuprofen (IBU) 600 MG tablet Take 1 tablet by mouth every 6 hours as needed for Pain 120 tablet 0    busPIRone (BUSPAR) 10 MG tablet TAKE 1 TABLET BY MOUTH TWICE A DAY  1    DUEXIS 800-26.6 MG TABS TAKE 1 TABLET BY ORAL ROUTE 2 TIMES EVERY DAY AS NEEDED FOR PAIN  2    PARoxetine (PAXIL) 30 MG tablet TAKE 1 TABLET BY MOUTH EVERY DAY  1     No Known Allergies    REVIEW OF SYSTEMS  10 systems reviewed, pertinent positives per HPI otherwise noted to be negative.     PHYSICAL EXAM  BP (!) 140/96   Pulse 93   Temp 98.5 °F (36.9 °C) (Oral)   Resp 18   Ht 5' (1.524 m)   Wt 150 lb (68 kg)   SpO2 96%   BMI 29.29 kg/m²   GENERAL APPEARANCE: Awake and alert. Cooperative. No acute distress. HEAD: Normocephalic. Atraumatic. EYES: PERRL. EOM's grossly intact. ENT: Mucous membranes are moist.  Both lips appear moderately swollen, tongue appears normal in size as well as uvula clear posterior oropharynx. Bilateral TMs and external auditory canal appear unremarkable. NECK: Supple. HEART: RRR. CHEST/LUNGS: Chest atraumatic, nontender, respirations unlabored. CTAB. Good air exchange. Speaking comfortably in full sentences. BACK: No midline spinal tenderness or step-off. ABDOMEN: Soft. Non-distended. Non-tender. No guarding or rebound. Normal bowel sounds. EXTREMITIES: No peripheral edema. Moves all extremities equally. All extremities neurovascularly intact. SKIN: Warm and dry. No acute rashes. NEUROLOGICAL: Alert and oriented. CN 2-12 intact, No gross facial drooping. Strength 5/5, sensation intact. Normal coordination. Gait normal.   PSYCHIATRIC: Normal mood and affect. ED COURSE/MDM  Patient seen and evaluated. Patient presents here with allergic reaction bilateral lip swelling we will give her a dose of IV Benadryl, Decadron, Pepcid, and as well as IM epinephrine considering there is oral involvement. Her lungs are clear on exam and she is in no distress. EXAM IMPROVED< WILL DC HOME WITH MEDROL DOSE PACK. Plan of care discussed with patient. Patient in agreement with plan. New Prescriptions    METHYLPREDNISOLONE (MEDROL, TREMAINE,) 4 MG TABLET    Take by mouth. CLINICAL IMPRESSION  1. Allergic reaction, initial encounter    2. Swelling of both lips        Blood pressure (!) 140/96, pulse 93, temperature 98.5 °F (36.9 °C), temperature source Oral, resp. rate 18, height 5' (1.524 m), weight 150 lb (68 kg), SpO2 96 %. DISPOSITION  Bijan Phillips was discharged to home in stable condition.      This chart was generated in part by using Dragon Dictation system and may contain errors related to that system including errors in grammar, punctuation, and spelling, as well as words and phrases that may be inappropriate. When dictating, effort is made to correct spelling/grammar errors. If there are any questions or concerns please feel free to contact the dictating provider for clarification.      Asia Cohn,   ATTENDING, [address] [facility], DO  11/27/20 6000

## 2021-07-12 ENCOUNTER — HOSPITAL ENCOUNTER (EMERGENCY)
Age: 56
Discharge: HOME | End: 2021-07-12
Payer: COMMERCIAL

## 2021-07-12 VITALS
DIASTOLIC BLOOD PRESSURE: 81 MMHG | RESPIRATION RATE: 18 BRPM | WEIGHT: 140 LBS | HEART RATE: 88 BPM | BODY MASS INDEX: 27.48 KG/M2 | SYSTOLIC BLOOD PRESSURE: 128 MMHG | TEMPERATURE: 98.3 F | HEIGHT: 60 IN | OXYGEN SATURATION: 99 %

## 2021-07-12 DIAGNOSIS — M54.32 SCIATICA OF LEFT SIDE: Primary | ICD-10-CM

## 2021-07-12 PROCEDURE — 99283 EMERGENCY DEPT VISIT LOW MDM: CPT

## 2021-07-12 PROCEDURE — 6370000000 HC RX 637 (ALT 250 FOR IP): Performed by: PHYSICIAN ASSISTANT

## 2021-07-12 RX ORDER — HYDROCODONE BITARTRATE AND ACETAMINOPHEN 5; 325 MG/1; MG/1
1 TABLET ORAL ONCE
Status: COMPLETED | OUTPATIENT
Start: 2021-07-12 | End: 2021-07-12

## 2021-07-12 RX ORDER — PREDNISONE 10 MG/1
20 TABLET ORAL DAILY
Qty: 10 TABLET | Refills: 0 | Status: SHIPPED | OUTPATIENT
Start: 2021-07-12 | End: 2021-07-17

## 2021-07-12 RX ORDER — HYDROCODONE BITARTRATE AND ACETAMINOPHEN 5; 325 MG/1; MG/1
1 TABLET ORAL EVERY 6 HOURS PRN
Qty: 8 TABLET | Refills: 0 | Status: SHIPPED | OUTPATIENT
Start: 2021-07-12 | End: 2021-07-16

## 2021-07-12 RX ORDER — LIDOCAINE 50 MG/G
1 PATCH TOPICAL DAILY
Qty: 10 PATCH | Refills: 0 | Status: SHIPPED | OUTPATIENT
Start: 2021-07-12 | End: 2021-10-29 | Stop reason: SDUPTHER

## 2021-07-12 RX ORDER — LIDOCAINE 4 G/G
1 PATCH TOPICAL DAILY
Status: DISCONTINUED | OUTPATIENT
Start: 2021-07-12 | End: 2021-07-12 | Stop reason: HOSPADM

## 2021-07-12 RX ADMIN — HYDROCODONE BITARTRATE AND ACETAMINOPHEN 1 TABLET: 5; 325 TABLET ORAL at 18:28

## 2021-07-12 ASSESSMENT — PAIN SCALES - GENERAL
PAINLEVEL_OUTOF10: 10
PAINLEVEL_OUTOF10: 10
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
## 2021-08-05 ENCOUNTER — OFFICE VISIT (OUTPATIENT)
Dept: INTERNAL MEDICINE CLINIC | Age: 56
End: 2021-08-05
Payer: COMMERCIAL

## 2021-08-05 VITALS
HEART RATE: 80 BPM | SYSTOLIC BLOOD PRESSURE: 124 MMHG | DIASTOLIC BLOOD PRESSURE: 78 MMHG | WEIGHT: 137 LBS | OXYGEN SATURATION: 97 % | BODY MASS INDEX: 26.76 KG/M2

## 2021-08-05 DIAGNOSIS — Z12.31 ENCOUNTER FOR SCREENING MAMMOGRAM FOR BREAST CANCER: ICD-10-CM

## 2021-08-05 DIAGNOSIS — Z00.00 ANNUAL PHYSICAL EXAM: Primary | ICD-10-CM

## 2021-08-05 DIAGNOSIS — Z12.11 SCREENING FOR COLON CANCER: ICD-10-CM

## 2021-08-05 DIAGNOSIS — Z13.220 SCREENING FOR HYPERLIPIDEMIA: ICD-10-CM

## 2021-08-05 DIAGNOSIS — Z11.4 SCREENING FOR HIV WITHOUT PRESENCE OF RISK FACTORS: ICD-10-CM

## 2021-08-05 DIAGNOSIS — Z11.59 ENCOUNTER FOR HEPATITIS C SCREENING TEST FOR LOW RISK PATIENT: ICD-10-CM

## 2021-08-05 DIAGNOSIS — M54.50 CHRONIC BILATERAL LOW BACK PAIN WITHOUT SCIATICA: ICD-10-CM

## 2021-08-05 DIAGNOSIS — G89.29 CHRONIC BILATERAL LOW BACK PAIN WITHOUT SCIATICA: ICD-10-CM

## 2021-08-05 LAB
BACTERIA: ABNORMAL /HPF
BILIRUBIN URINE: NEGATIVE
BLOOD, URINE: NEGATIVE
CLARITY: ABNORMAL
COLOR: YELLOW
EPITHELIAL CELLS, UA: 5 /HPF (ref 0–5)
GLUCOSE URINE: NEGATIVE MG/DL
HYALINE CASTS: 1 /LPF (ref 0–8)
KETONES, URINE: NEGATIVE MG/DL
LEUKOCYTE ESTERASE, URINE: ABNORMAL
MICROSCOPIC EXAMINATION: YES
NITRITE, URINE: NEGATIVE
PH UA: 6 (ref 5–8)
PROTEIN UA: NEGATIVE MG/DL
RBC UA: 1 /HPF (ref 0–4)
SPECIFIC GRAVITY UA: 1.01 (ref 1–1.03)
URINE TYPE: ABNORMAL
UROBILINOGEN, URINE: 0.2 E.U./DL
WBC UA: 3 /HPF (ref 0–5)

## 2021-08-05 PROCEDURE — G8419 CALC BMI OUT NRM PARAM NOF/U: HCPCS | Performed by: NURSE PRACTITIONER

## 2021-08-05 PROCEDURE — 99214 OFFICE O/P EST MOD 30 MIN: CPT | Performed by: NURSE PRACTITIONER

## 2021-08-05 PROCEDURE — 3017F COLORECTAL CA SCREEN DOC REV: CPT | Performed by: NURSE PRACTITIONER

## 2021-08-05 PROCEDURE — 1036F TOBACCO NON-USER: CPT | Performed by: NURSE PRACTITIONER

## 2021-08-05 PROCEDURE — G8427 DOCREV CUR MEDS BY ELIG CLIN: HCPCS | Performed by: NURSE PRACTITIONER

## 2021-08-05 SDOH — ECONOMIC STABILITY: FOOD INSECURITY: WITHIN THE PAST 12 MONTHS, THE FOOD YOU BOUGHT JUST DIDN'T LAST AND YOU DIDN'T HAVE MONEY TO GET MORE.: NEVER TRUE

## 2021-08-05 SDOH — ECONOMIC STABILITY: FOOD INSECURITY: WITHIN THE PAST 12 MONTHS, YOU WORRIED THAT YOUR FOOD WOULD RUN OUT BEFORE YOU GOT MONEY TO BUY MORE.: NEVER TRUE

## 2021-08-05 ASSESSMENT — PATIENT HEALTH QUESTIONNAIRE - PHQ9
SUM OF ALL RESPONSES TO PHQ QUESTIONS 1-9: 0
SUM OF ALL RESPONSES TO PHQ9 QUESTIONS 1 & 2: 0
2. FEELING DOWN, DEPRESSED OR HOPELESS: 0
1. LITTLE INTEREST OR PLEASURE IN DOING THINGS: 0
SUM OF ALL RESPONSES TO PHQ QUESTIONS 1-9: 0
SUM OF ALL RESPONSES TO PHQ QUESTIONS 1-9: 0

## 2021-08-05 ASSESSMENT — ENCOUNTER SYMPTOMS
BACK PAIN: 1
SHORTNESS OF BREATH: 0
WHEEZING: 0
COUGH: 0
CHEST TIGHTNESS: 0

## 2021-08-05 ASSESSMENT — SOCIAL DETERMINANTS OF HEALTH (SDOH): HOW HARD IS IT FOR YOU TO PAY FOR THE VERY BASICS LIKE FOOD, HOUSING, MEDICAL CARE, AND HEATING?: NOT HARD AT ALL
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
## 2021-08-11 ENCOUNTER — HOSPITAL ENCOUNTER (OUTPATIENT)
Age: 56
Discharge: HOME | End: 2021-08-11
Payer: COMMERCIAL

## 2021-08-11 ENCOUNTER — HOSPITAL ENCOUNTER (OUTPATIENT)
Dept: GENERAL RADIOLOGY | Age: 56
Discharge: HOME | End: 2021-08-11
Payer: COMMERCIAL

## 2021-08-11 ENCOUNTER — HOSPITAL ENCOUNTER (OUTPATIENT)
Dept: PHYSICAL THERAPY | Age: 56
Setting detail: THERAPIES SERIES
Discharge: HOME | End: 2021-08-11
Payer: COMMERCIAL

## 2021-08-11 ENCOUNTER — HOSPITAL ENCOUNTER (OUTPATIENT)
Dept: PHYSICAL THERAPY | Age: 56
Setting detail: THERAPIES SERIES
End: 2021-08-11
Payer: COMMERCIAL

## 2021-08-11 DIAGNOSIS — M54.50 CHRONIC BILATERAL LOW BACK PAIN WITHOUT SCIATICA: ICD-10-CM

## 2021-08-11 DIAGNOSIS — G89.29 CHRONIC BILATERAL LOW BACK PAIN WITHOUT SCIATICA: ICD-10-CM

## 2021-08-11 PROCEDURE — 72100 X-RAY EXAM L-S SPINE 2/3 VWS: CPT

## 2021-08-11 NOTE — PLAN OF CARE
[address] [address], [address] [place] [address] Drive  Phone: [phone]   Fax: [phone]     Physical Therapy Certification    Dear Referring Practitioner: BARRY Tillman,    We had the pleasure of evaluating the following patient for physical therapy services at [address] [place] [address]. A summary of our findings can be found in the initial assessment below. This includes our plan of care. If you have any questions or concerns regarding these findings, please do not hesitate to contact me at the office phone number checked above. Thank you for the referral.       Physician Signature:_______________________________Date:__________________  By signing above (or electronic signature), therapists plan is approved by physician      Patient: Yanci Barrientos   : 1965   MRN: [id number]  Referring Physician: Referring Practitioner: BARRY Tillman      Evaluation Date: 2021      Medical Diagnosis Information:  Diagnosis: Chronic bilateral low back pain without sciatica M54.5, G89.29                                             Insurance information: PT Insurance Information: [employer]     Precautions/ Contra-indications:   Latex Allergy:  [x]NO      []YES  Preferred Language for Healthcare:   []English       [x]Other:  Maori    C-SSRS Triggered by Intake questionnaire (Past 2 wk assessment ):   [x] No, Questionnaire did not trigger screening.   [] Yes, Patient intake triggered C-SSRS Screening     [] Completed, no further action required. [] Completed, PCP notified via Epic    SUBJECTIVE: Patient stated complaint: Pt referred for low back pain. Patient received  16 visits for same condition through 20 and was discharged with IND with HEP and good outcomes. Recently went to the ER on 21 with incident of back pain that was radiating down her left leg.   She was prescribed Lidocaine patches, prednisone, and norco Contraction Scale    Criteria                                               Score  Quality of Contraction   Not Present      [] 0   Rapid, Superificial     [] 1   Just Perceptible     [] 2     Gentle, Slow      [] 3    Substitution   Resting       [] 0   Moderate to Strong     [] 1    Subtle Perceptible     [] 2   None       [] 3    Symmetry of Contraction   Unilateral       [] 0   Bilateral/Asymmetrical     [] 1   Symmetrical       [] 2    Breathing     Inability/Difficulty Breathing during contraction [] 0   Able to hold contraction while Breathing  [] 1    Holding   Able to Hold Contraction <10 s   [] 0   Able to Hold Contraction >10 s   [] 1      __/10  Adapted from Ian Nip al, Copyright 2009        Neural dynamic tension testing Normal Abnormal Comments   Slump Test  - Degree of knee flexion:       SLR       0-30      30-70      Femoral nerve (L2-4)          Orthopedic Special Tests:    Normal Abnormal N/A Comments   Toe walk         Heel Walk       Fwd Bend-aberrant or innominate mvmt)       Trendelenburg       Kemps/Quadrant       Stork       DILMA/Panda       Hip scour       SLR       Crossed SLR       Supine to sit       Hip thrust       SI distraction/compression       PA/Spring       Prone Instability test       Prone knee bend       Sacral Spring/thrust                  [x] Patient history, allergies, meds reviewed. Medical chart reviewed. See intake form. Review Of Systems (ROS):  [x]Performed Review of systems (Integumentary, CardioPulmonary, Neurological) by intake and observation. Intake form has been scanned into medical record. Patient has been instructed to contact their primary care physician regarding ROS issues if not already being addressed at this time.       Co-morbidities/Complexities (which will affect course of rehabilitation):   []None        []Hx of COVID   Arthritic conditions   []Rheumatoid arthritis (M05.9)  []Osteoarthritis (M19.91)  []Gout   Cardiovascular conditions []Hypertension (I10)  []Hyperlipidemia (E78.5)  []Angina pectoris (I20)  []Atherosclerosis (I70)  []Pacemaker  []Hx of CABG/stent/  cardiac surgeries   Musculoskeletal conditions   []Disc pathology   []Congenital spine pathologies   []Osteoporosis (M81.8)  []Osteopenia (M85.8)  []Scoliosis       Endocrine conditions   []Hypothyroid (E03.9)  []Hyperthyroid Gastrointestinal conditions   []Constipation (K31.93)   Metabolic conditions   []Morbid obesity (E66.01)  []Diabetes type 1(E10.65) or 2 (E11.65)   []Neuropathy (G60.9)     Cardio/Pulmonary conditions   []Asthma (J45)  []Coughing   []COPD (J44.9)  []CHF  []A-fib   Psychological Disorders  []Anxiety (F41.9)  []Depression (F32.9)   []Other:   Developmental Disorders  []Autism (F84.0)  []CP (G80)  []Down Syndrome (Q90.9)  []Developmental delay     Neurological conditions  []Prior Stroke (I69.30)  []Parkinson's (G20)  []Encephalopathy (G93.40)  []MS (G35)  []Post-polio (G14)  []SCI  []TBI  []ALS Other conditions  []Fibromyalgia (M79.7)  []Vertigo  []Syncope  []Kidney Failure  []Cancer      []currently undergoing                treatment  []Pregnancy  []Incontinence   Prior surgeries  []involved limb  []previous spinal surgery  [] section birth  []hysterectomy  []bowel / bladder surgery  []other relevant surgeries   []Other:              Barriers to/and or personal factors that will affect rehab potential:              []Age  []Sex    []Smoker              []Motivation/Lack of Motivation                        []Co-Morbidities              []Cognitive Function, education/learning barriers              []Environmental, home barriers              []profession/work barriers  []past PT/medical experience  []other:  Justification:     Falls Risk Assessment (30 days):   [x] Falls Risk assessed and no intervention required.   [] Falls Risk assessed and Patient requires intervention due to being higher risk   TUG score (>12s at risk):     [] Falls education provided, including         ASSESSMENT:   Functional Impairments:     []Noted lumbar/proximal hip hypomobility   []Noted lumbosacral and/or generalized hypermobility   []Decreased Lumbosacral/hip/LE functional ROM   []Decreased core/proximal hip strength and neuromuscular control    []Decreased LE functional strength    []Abnormal reflexes/sensation/myotomal/dermatomal deficits  []Reduced balance/proprioceptive control    []other:      Functional Activity Limitations (from functional questionnaire and intake)   []Reduced ability to tolerate prolonged functional positions   []Reduced ability or difficulty with changes of positions or transfers between positions   []Reduced ability to maintain good posture and demonstrate good body mechanics with sitting, bending, and lifting   []Reduced ability to sleep   [] Reduced ability or tolerance with driving and/or computer work   []Reduced ability to perform lifting, reaching, carrying tasks   []Reduced ability to squat   []Reduced ability to forward bend   []Reduced ability to ambulate prolonged functional periods/distances/surfaces   []Reduced ability to ascend/descend stairs   []other:       Participation Restrictions   []Reduced participation in self care activities   []Reduced participation in home management activities   []Reduced participation in work activities   []Reduced participation in social activities. []Reduced participation in sport/recreational activities. Classification:   []Signs/symptoms consistent with Lumbar instability/stabilization subgroup. []Signs/symptoms consistent with Lumbar mobilization/manipulation subgroup, myotomes and dermatomes intact. Meets manipulation criteria.     []Signs/symptoms consistent with Lumbar direction specific/centralization subgroup   []Signs/symptoms consistent with Lumbar traction subgroup     []Signs/symptoms consistent with lumbar facet dysfunction   []Signs/symptoms consistent with lumbar stenosis type activation and proprioception for glutes , LE and Core   [x]  Manual therapy as indicated for Hip complex, LE and spine to include: Dry Needling/IASTM, STM, PROM, Gr I-IV mobilizations, manipulation. [x]  Modalities as needed that may include: thermal agents, E-stim, Biofeedback, US, iontophoresis as indicated  [x]  Patient education on joint protection, postural re-education, activity modification, progression of HEP. HEP instruction: Written HEP instructions provided and reviewed     GOALS:  Patient stated goal:   [] Progressing: [] Met: [] Not Met: [] Adjusted    Therapist goals for Patient:   Short Term Goals: To be achieved in: 2 weeks  1. Independent in HEP and progression per patient tolerance, in order to prevent re-injury. [] Progressing: [] Met: [] Not Met: [] Adjusted  2. Patient will have a decrease in pain to facilitate improvement in movement, function, and ADLs as indicated by Functional Deficits. [] Progressing: [] Met: [] Not Met: [] Adjusted    Long Term Goals: To be achieved in:  weeks  1. Disability index score of % or less for the ALENA to assist with reaching prior level of function. [] Progressing: [] Met: [] Not Met: [] Adjusted  2. Patient will demonstrate increased AROM to WNL, good LS mobility, good hip ROM to allow for proper joint functioning as indicated by patients Functional Deficits. [] Progressing: [] Met: [] Not Met: [] Adjusted  3. Patient will demonstrate an increase in Strength to good proximal hip and core activation to allow for proper functional mobility as indicated by patients Functional Deficits. [] Progressing: [] Met: [] Not Met: [] Adjusted  4. Patient will return to functional activities including  without increased symptoms or restriction.    [] Progressing: [] Met: [] Not Met: [] Adjusted  5. (patient specific functional goal)    [] Progressing: [] Met: [] Not Met: [] Adjusted     Electronically signed by:  Allison August PT

## 2021-08-11 NOTE — FLOWSHEET NOTE
Physical Therapy  Cancellation/No-show Note  Patient Name:  Alva Murray  :  1965   Date:  2021  Cancelled visits to date: 0  No-shows to date: 1    Patient status for today's appointment patient:  []  Cancelled  []  Rescheduled appointment  [x]  No-show  (eval)     Reason given by patient:  []  Patient ill  []  Conflicting appointment  []  No transportation    []  Conflict with work  []  No reason given  []  Other:     Comments:      Phone call information:   []  Phone call made today to patient at _ time at number provided:      []  Patient answered, conversation as follows:    []  Patient did not answer, message left as follows:  [x]  Phone call not made today  []  Phone call not needed - pt contacted us to cancel and provided reason for cancellation.      Electronically signed by:  Allison August PT

## 2021-08-19 ENCOUNTER — HOSPITAL ENCOUNTER (OUTPATIENT)
Dept: PHYSICAL THERAPY | Age: 56
Setting detail: THERAPIES SERIES
Discharge: HOME | End: 2021-08-19
Payer: COMMERCIAL

## 2021-08-19 PROCEDURE — 97161 PT EVAL LOW COMPLEX 20 MIN: CPT

## 2021-08-19 PROCEDURE — 97110 THERAPEUTIC EXERCISES: CPT

## 2021-08-19 PROCEDURE — 97530 THERAPEUTIC ACTIVITIES: CPT

## 2021-08-19 NOTE — FLOWSHEET NOTE
[facility] Physical Therapy  Phone: [phone]   Fax: [phone]    Physical Therapy Daily Treatment Note  Date:  2021    Patient Name:  Maryuri Ellis    :  1965  MRN: [id number]  Medical/Treatment Diagnosis Information:  Diagnosis: Chronic bilateral low back pain without sciatica M54.5, G89.29  Treatment Diagnosis: poor habitual postures/body mechs, myofascial pain, strength and flexibility deficits contribute to pt's back and sciatica pain  Insurance/Certification information:  PT Insurance Information: [place] 30 visits soft limit. no auth  Physician Information:  Referring Practitioner: BARRY Ruano  Plan of care signed (Y/N): []  Yes [x]  No     Date of Patient follow up with Physician:      Progress Report: [x]  Yes  []  No     Date Range for reporting period:  Beginnin/19  Ending:     Progress report due (10 Rx/or 30 days whichever is less): visit #06 or 783    Recertification due (POC duration/ or 90 days whichever is less): visit #12 or     Visit # Insurance Allowable Auth required? Date Range    30 []  Yes  [x]  No NA         Latex Allergy:  [x]NO      []YES  Preferred Language for Healthcare:   [x]English       []other:    Functional Scale:        Date assessed:  oswestry: raw score = 31, dysfunction = 62%, taken at initial eval    Pain level:  9-10/10     SUBJECTIVE:  See eval    OBJECTIVE:  used Ipad interpretor for [place] [employer]  Pt's son present for PT eval and tx. See eval      RESTRICTIONS/PRECAUTIONS: hx of covid 1 yr ago     Exercises/Interventions: PREFERS MAT EX for HEP BC THEY HELP WITH PAIN.  Per pt, low alix of standing ex, especially  for HEP    Therapeutic Exercises (52540) Resistance / level Sets/sec Reps Notes   Nu step or bike                                   Stabs  TrA iso   10x5\"      Mat exercises  SKTC  Fig 4 pirformis stretch    Supine piriformis stretch   Supine HS stretch    3x30\" B  Attempted on R - too painful so stopped  3x30\" B  3x30\" B   Used towel to assist                  Therapeutic Activities (70179)        Extensive pt ed on activity levels, ex intensity, PT POC                                  Neuromuscular Re-ed (87170)                                                 Manual Intervention (38775)       Roller to B lower quarter NPV - prn                                             Modalities: next session - heat prn    Pt. Education:  -8/19 patient and her son  Educated extensively on diagnosis, prognosis and expectations for rehab. Instructed pt on approp ex /activity intensity.   -all patient questions were answered    Home Exercise Program:    Access Code: [email or web address]  URL: [email or web address]  Date: 08/19/2021  Prepared by: Rajendra Perezbons    Exercises  Supine Posterior Pelvic Tilt - 2 x daily - 7 x weekly - 1-2 sets - 10 reps - 5 hold  Supine Hamstring Stretch - 2 x daily - 7 x weekly - 1 sets - 3-5 reps - 20-30 hold  Hooklying Single Knee to Chest Stretch - 2 x daily - 7 x weekly - 1 sets - 3 reps - 30 hold  Supine Piriformis Stretch with Foot on Ground - 2 x daily - 7 x weekly - 1 sets - 3 reps - 30 hold      Therapeutic Exercise and NMR EXR  [] (90057) Provided verbal/tactile cueing for activities related to strengthening, flexibility, endurance, ROM for improvements in  [] LE / Lumbar: LE, proximal hip, and core control with self care, mobility, lifting, ambulation. [] UE / Cervical: cervical, postural, scapular, scapulothoracic and UE control with self care, reaching, carrying, lifting, house/yardwork, driving, computer work.  [] (10141) Provided verbal/tactile cueing for activities related to improving balance, coordination, kinesthetic sense, posture, motor skill, proprioception to assist with   [] LE / lumbar: LE, proximal hip, and core control in self care, mobility, lifting, ambulation and eccentric single leg control.    [] UE / cervical: cervical, scapular, scapulothoracic and UE control with self care, reaching, carrying, lifting, house/yardwork, driving, computer work.   [] (23092) Therapist is in constant attendance of 2 or more patients providing skilled therapy interventions, but not providing any significant amount of measurable one-on-one time to either patient, for improvements in  [] LE / lumbar: LE, proximal hip, and core control in self care, mobility, lifting, ambulation and eccentric single leg control. [] UE / cervical: cervical, scapular, scapulothoracic and UE control with self care, reaching, carrying, lifting, house/yardwork, driving, computer work. NMR and Therapeutic Activities:    [] (07601 or 23620) Provided verbal/tactile cueing for activities related to improving balance, coordination, kinesthetic sense, posture, motor skill, proprioception and motor activation to allow for proper function of   [] LE: / Lumbar core, proximal hip and LE with self care and ADLs  [] UE / Cervical: cervical, postural, scapular, scapulothoracic and UE control with self care, carrying, lifting, driving, computer work.   [] (05105) Gait Re-education- Provided training and instruction to the patient for proper LE, core and proximal hip recruitment and positioning and eccentric body weight control with ambulation re-education including up and down stairs     Home Management Training / Self Care:  [] (22836) Provided self-care/home management training related to activities of daily living and compensatory training, and/or use of adaptive equipment for improvement with: ADLs and compensatory training, meal preparation, safety procedures and instruction in use of adaptive equipment, including bathing, grooming, dressing, personal hygiene, basic household cleaning and chores.      Home Exercise Program:    [x] (74413) Reviewed/Progressed HEP activities related to strengthening, flexibility, endurance, ROM of   [] LE / Lumbar: core, proximal hip and LE for functional self-care, mobility, lifting and ambulation/stair navigation   [] UE / Cervical: cervical, postural, scapular, scapulothoracic and UE control with self care, reaching, carrying, lifting, house/yardwork, driving, computer work  [] (73213)Reviewed/Progressed HEP activities related to improving balance, coordination, kinesthetic sense, posture, motor skill, proprioception of   [] LE: core, proximal hip and LE for self care, mobility, lifting, and ambulation/stair navigation    [] UE / Cervical: cervical, postural,  scapular, scapulothoracic and UE control with self care, reaching, carrying, lifting, house/yardwork, driving, computer work    Manual Treatments:  PROM / STM / Oscillations-Mobs:  G-I, II, III, IV (PA's, Inf., Post.)  [] (87413) Provided manual therapy to mobilize LE, proximal hip and/or LS spine soft tissue/joints for the purpose of modulating pain, promoting relaxation,  increasing ROM, reducing/eliminating soft tissue swelling/inflammation/restriction, improving soft tissue extensibility and allowing for proper ROM for normal function with   [] LE / lumbar: self care, mobility, lifting and ambulation. [] UE / Cervical: self care, reaching, carrying, lifting, house/yardwork, driving, computer work. Modalities:  [] (47835) Vasopneumatic compression: Utilized vasopneumatic compression to decrease edema / swelling for the purpose of improving mobility and quad tone / recruitment which will allow for increased overall function including but not limited to self-care, transfers, ambulation, and ascending / descending stairs.        Charges:  Timed Code Treatment Minutes: 40   Total Treatment Minutes: 60     [x] EVAL - LOW (68394)   [] EVAL - MOD (66261)  [] EVAL - HIGH (60301)  [] RE-EVAL (62664)  [x] PH(53237) x  1     [] Ionto  [] NMR (55260) x       [] Vaso  [] Manual (11393) x       [] Ultrasound  [x] TA x   2     [] Mech Traction (82804)  [] Aquatic Therapy x     [] ES (un) (80073):   [] Home Management Training x  [] ES(attended) (77360)   [] Dry Needling 1-2 muscles (81618):  [] Dry Needling 3+ muscles (514957)  [] Group:      [] Other:     GOALS:  Patient stated goal: less pain  []? Progressing: []? Met: []? Not Met: []? Adjusted     Therapist goals for Patient:   Short Term Goals: To be achieved in: 2 weeks  1. Independent in HEP and progression per patient tolerance, in order to prevent re-injury. []? Progressing: []? Met: []? Not Met: []? Adjusted  2. Patient will have a decrease in pain to facilitate improvement in movement, function, and ADLs as indicated by improvement with respect to Functional Deficits. []? Progressing: []? Met: []? Not Met: []? Adjusted     Long Term Goals: To be achieved in:6 weeks  1. Disability index score of 20 % or less on the   to assist with reaching prior level of function. []? Progressing: []? Met: []? Not Met: []? Adjusted  2. Patient will demonstrate increased AROM  to [facility], to allow for proper joint functioning to allow pt to resume ADLS/IADLS, home chores, lifting with good posture and body mechs all  without increase in symptoms. []? Progressing: []? Met: []? Not Met: []? Adjusted  3. Patient will demonstrate increased Strength by 1/2 mmt grade  and core activation to allow for proper functional mobility as indicated by patients Functional Deficits to allow pt to resume walking >/=  30-60 min and prn for shopping, going out in Community without increase in symptoms. []? Progressing: []? Met: []? Not Met: []? Adjusted  4. 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 []? Progressing: []? Met: []? Not Met: []? Adjusted     Overall Progression Towards Functional goals/ Treatment Progress Update:  [] Patient is progressing as expected towards functional goals listed. [] Progression is slowed due to complexities/Impairments listed.   [] Progression has been slowed due to co-morbidities. [x] Plan just implemented, too soon to assess goals progression <30days   [] Goals require adjustment due to lack of progress  [] Patient is not progressing as expected and requires additional follow up with physician  [] Other    Persisting Functional Limitations/Impairments:  [x]Sleeping [x]Sitting               [x]Standing [x]Transfers        [x]Walking []Kneeling               [x]Stairs [x]Squatting / bending   [x]ADLs [x]Reaching  [x]Lifting  [x]Housework  []Driving []Job related tasks  []Sports/Recreation []Other:        ASSESSMENT:  See eval  Treatment/Activity Tolerance:  [x] Patient able to complete tx [] Patient limited by fatigue  [] Patient limited by pain  [] Patient limited by other medical complications  [] Other:     Prognosis: [x] Good [] Fair  [] Poor    Patient Requires Follow-up: [x] Yes  [] No    PLAN:  strength, ROM/flexibility, posture and body mechs, manual, MOC, HEP, pt education     Frequency/Duration:   2 days per week for 6 Weeks:  Interventions:  [x]? Therapeutic exercise including:strength, ROM, flexibility  [x]? NMR activation and proprioception including postural re-education  [x]? Manual therapy as indicated to include: IASTM, STM, PROM, Gr I-IV mobilizations, manipulation. [x]? Modalities as needed that may include: thermal agents, E-stim, Biofeedback, US, iontophoresis as indicated  [x]? Patient education on joint protection, postural re-education, activity modification, progression of HEP. AQUATIC THERAPY     Plan for next treatment session: start with emphasis on mat table ex, manual, heat and progress to more fxnl/CKC activities as alix    PLAN: See eval. PT 2x / week for 6  weeks.    [] Continue per plan of care [] Alter current plan (see comments)  [x] Plan of care initiated [] Hold pending MD visit [] Discharge    Electronically signed by: Gabriella Gomez, PT PT, DPT    Note: If patient does not return for scheduled/ recommended follow up visits, this note will serve as a discharge from care along with most recent update on progress.
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
## 2021-08-26 ENCOUNTER — HOSPITAL ENCOUNTER (OUTPATIENT)
Dept: PHYSICAL THERAPY | Age: 56
Setting detail: THERAPIES SERIES
Discharge: HOME | End: 2021-08-26
Payer: COMMERCIAL

## 2021-08-26 PROCEDURE — 97140 MANUAL THERAPY 1/> REGIONS: CPT

## 2021-08-26 PROCEDURE — 97110 THERAPEUTIC EXERCISES: CPT
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
## 2021-08-27 ENCOUNTER — HOSPITAL ENCOUNTER (OUTPATIENT)
Dept: WOMENS IMAGING | Age: 56
Discharge: HOME | End: 2021-08-27
Payer: COMMERCIAL

## 2021-08-27 VITALS — HEIGHT: 62 IN | WEIGHT: 140 LBS | BODY MASS INDEX: 25.76 KG/M2

## 2021-08-27 DIAGNOSIS — Z12.31 ENCOUNTER FOR SCREENING MAMMOGRAM FOR BREAST CANCER: ICD-10-CM

## 2021-08-27 PROCEDURE — 77067 SCR MAMMO BI INCL CAD: CPT

## 2021-08-31 ENCOUNTER — HOSPITAL ENCOUNTER (OUTPATIENT)
Dept: PHYSICAL THERAPY | Age: 56
Setting detail: THERAPIES SERIES
Discharge: HOME | End: 2021-08-31
Payer: COMMERCIAL

## 2021-08-31 PROCEDURE — 97110 THERAPEUTIC EXERCISES: CPT

## 2021-08-31 PROCEDURE — 97535 SELF CARE MNGMENT TRAINING: CPT

## 2021-08-31 NOTE — FLOWSHEET NOTE
[address] [facility] Physical Therapy  Phone: [phone]   Fax: [phone]    Physical Therapy Daily Treatment Note  Date:  2021    Patient Name:  Charo Roach    :  1965  MRN: [id number]  Medical/Treatment Diagnosis Information:  Diagnosis: Chronic bilateral low back pain without sciatica M54.5, G89.29  Treatment Diagnosis: poor habitual postures/body mechs, myofascial pain, strength and flexibility deficits contribute to pt's back and sciatica pain  Insurance/Certification information:  PT Insurance Information: [employer] 30 visits soft limit. no auth  Physician Information:  Referring Practitioner: BARRY Anthony  Plan of care signed (Y/N): []  Yes [x]  No     Date of Patient follow up with Physician:      Progress Report: []  Yes  [x]  No      Date Range for reporting period:  Beginnin/19  Ending:     Progress report due (10 Rx/or 30 days whichever is less): visit #98 or 956    Recertification due (POC duration/ or 90 days whichever is less): visit #12 or     Visit # Insurance Allowable Auth required? Date Range   3 /12 30 []  Yes  [x]  No NA         Latex Allergy:  [x]NO      []YES  Preferred Language for Healthcare:   [x]English       []other:    Functional Scale:        Date assessed:  oswestry: raw score = 31, dysfunction = 62%, taken at initial eval    Pain level:  7-9/10      SUBJECTIVE: still having pain - in back and buttocks     OBJECTIVE: , ,   used Ipad interpretor for Ukrainian   difficulty with TrA iso   Pt's son present for PT eval and tx. See eval      RESTRICTIONS/PRECAUTIONS: hx of covid 1 yr ago     Exercises/Interventions: PREFERS MAT EX for HEP BC THEY HELP WITH PAIN.  Per pt, low alix of standing ex, especially  for HEP    Therapeutic Exercises (36388) Resistance / level Sets Reps Notes   Nu step or bike                                   Stabs  TrA iso   10x5\"    required mod-max cues for correct performance   Mat exercises  [facility]  Fig 4 pirformis stretch in sitting  Supine piriformis stretch   Supine HS stretch    3x30\" B  3x 30\"  R  3x30\" B  3x30\" B   Used towel to assist   8/26: reviewed exercises                  Therapeutic Activities (98807)        Extensive pt ed on HEP - proper form and  activity levels,                                   Neuromuscular Re-ed (94956)                                                 Manual Intervention (64188)       Roller to B lower quarter NPV - prn      SIJ L ASIS higher, leg pull on L  STM to L ITB    8/31 deferred due to time spent re-instructing on HEP    8/26 Big leg length difference                                    Modalities: next session - heat prn    Pt. Education:  -8/31 review HEP - multiple cues for correct performance. Issued new handout with TrA iso and all ex. Re-explained all ex so that  pt could write notes in [place] in order  to remember how to do it correctly  8/19 patient and her son  Educated extensively on diagnosis, prognosis and expectations for rehab. Instructed pt on approp ex /activity intensity.   -all patient questions were answered    Home Exercise Program:  Access Code: [email or web address]  URL: TuneIn/  Date: 08/31/2021  Prepared by: Pearl Trivedi    Exercises  Supine Transversus Abdominis Bracing - Hands on Stomach - 2 x daily - 7 x weekly - 1 sets - 10 reps - 5 hold  Supine Piriformis Stretch with Foot on Ground - 2 x daily - 7 x weekly - 1 sets - 3 reps - 30 hold  Hooklying Single Knee to Chest Stretch - 2 x daily - 7 x weekly - 1 sets - 3 reps - 30 hold  Supine Hamstring Stretch with Strap - 1 x daily - 7 x weekly - 3 sets - 10 reps    Access Code: [email or web address]   URL: TuneIn/  Date: 08/19/2021  Prepared by: Pearl Trivedi    Exercises  Supine Posterior Pelvic Tilt - 2 x daily - 7 x weekly - 1-2 sets - 10 reps - 5 hold  Supine Hamstring Stretch - 2 x daily - 7 x weekly - 1 sets - 3-5 reps - 20-30 hold  Hooklying Single Knee to Chest Stretch - 2 x daily - 7 x weekly - 1 sets - 3 reps - 30 hold  Supine Piriformis Stretch with Foot on Ground - 2 x daily - 7 x weekly - 1 sets - 3 reps - 30 hold      Therapeutic Exercise and NMR EXR  [x] (24126) Provided verbal/tactile cueing for activities related to strengthening, flexibility, endurance, ROM for improvements in  [x] LE / Lumbar: LE, proximal hip, and core control with self care, mobility, lifting, ambulation. [] UE / Cervical: cervical, postural, scapular, scapulothoracic and UE control with self care, reaching, carrying, lifting, house/yardwork, driving, computer work.  [] (64375) Provided verbal/tactile cueing for activities related to improving balance, coordination, kinesthetic sense, posture, motor skill, proprioception to assist with   [] LE / lumbar: LE, proximal hip, and core control in self care, mobility, lifting, ambulation and eccentric single leg control. [] UE / cervical: cervical, scapular, scapulothoracic and UE control with self care, reaching, carrying, lifting, house/yardwork, driving, computer work.   [] (03085) Therapist is in constant attendance of 2 or more patients providing skilled therapy interventions, but not providing any significant amount of measurable one-on-one time to either patient, for improvements in  [] LE / lumbar: LE, proximal hip, and core control in self care, mobility, lifting, ambulation and eccentric single leg control. [] UE / cervical: cervical, scapular, scapulothoracic and UE control with self care, reaching, carrying, lifting, house/yardwork, driving, computer work.      NMR and Therapeutic Activities:    [x] (50400 or 30605) Provided verbal/tactile cueing for activities related to improving balance, coordination, kinesthetic sense, posture, motor skill, proprioception and motor activation to allow for proper function of   [x] LE: / Lumbar core, proximal hip and LE with self care and ADLs  [] UE / Cervical: cervical, postural, scapular, scapulothoracic and UE control with self care, carrying, lifting, driving, computer work.   [] (57806) Gait Re-education- Provided training and instruction to the patient for proper LE, core and proximal hip recruitment and positioning and eccentric body weight control with ambulation re-education including up and down stairs     Home Management Training / Self Care:  [] (69006) Provided self-care/home management training related to activities of daily living and compensatory training, and/or use of adaptive equipment for improvement with: ADLs and compensatory training, meal preparation, safety procedures and instruction in use of adaptive equipment, including bathing, grooming, dressing, personal hygiene, basic household cleaning and chores.      Home Exercise Program:    [x] (43100) Reviewed/Progressed HEP activities related to strengthening, flexibility, endurance, ROM of   [] LE / Lumbar: core, proximal hip and LE for functional self-care, mobility, lifting and ambulation/stair navigation   [] UE / Cervical: cervical, postural, scapular, scapulothoracic and UE control with self care, reaching, carrying, lifting, house/yardwork, driving, computer work  [] (80654)Reviewed/Progressed HEP activities related to improving balance, coordination, kinesthetic sense, posture, motor skill, proprioception of   [] LE: core, proximal hip and LE for self care, mobility, lifting, and ambulation/stair navigation    [] UE / Cervical: cervical, postural,  scapular, scapulothoracic and UE control with self care, reaching, carrying, lifting, house/yardwork, driving, computer work    Manual Treatments:  PROM / STM / Oscillations-Mobs:  G-I, II, III, IV (PA's, Inf., Post.)  [x] (27245) Provided manual therapy to mobilize LE, proximal hip and/or LS spine soft tissue/joints for the purpose of modulating pain, promoting relaxation,  increasing ROM, reducing/eliminating soft tissue swelling/inflammation/restriction, improving soft tissue extensibility and allowing for proper ROM for normal function with   [x] LE / lumbar: self care, mobility, lifting and ambulation. [] UE / Cervical: self care, reaching, carrying, lifting, house/yardwork, driving, computer work. Modalities:  [] (00708) Vasopneumatic compression: Utilized vasopneumatic compression to decrease edema / swelling for the purpose of improving mobility and quad tone / recruitment which will allow for increased overall function including but not limited to self-care, transfers, ambulation, and ascending / descending stairs. Charges:  Timed Code Treatment Minutes: 40   Total Treatment Minutes: 40     [] EVAL - LOW (72880)   [] EVAL - MOD (90647)  [] EVAL - HIGH (94401)  [] RE-EVAL (94490)  [x] EZ(45475) x  1    [] Ionto  [] NMR (10381) x       [] Vaso  [] Manual (16739) x       [] Ultrasound  [] TA x   2    [] Mech Traction (92173)  [] Aquatic Therapy x     [] ES (un) (52180):   [x] Home Management Training x 2  [] ES(attended) (09794)   [] Dry Needling 1-2 muscles (23717):  [] Dry Needling 3+ muscles (894985)  [] Group:      [] Other:     GOALS:  Patient stated goal: less pain  []? Progressing: []? Met: []? Not Met: []? Adjusted     Therapist goals for Patient:   Short Term Goals: To be achieved in: 2 weeks  1. Independent in HEP and progression per patient tolerance, in order to prevent re-injury. []? Progressing: []? Met: []? Not Met: []? Adjusted  2. Patient will have a decrease in pain to facilitate improvement in movement, function, and ADLs as indicated by improvement with respect to Functional Deficits. []? Progressing: []? Met: []? Not Met: []? Adjusted     Long Term Goals: To be achieved in:6 weeks  1. Disability index score of 20 % or less on the   to assist with reaching prior level of function. []? Progressing: []? Met: []? Not Met: []? Adjusted  2.  Patient will demonstrate increased AROM  to [facility],  allow for proper joint functioning to allow pt to resume ADLS/IADLS, home chores, lifting with good posture and body mechs all  without increase in symptoms. []? Progressing: []? Met: []? Not Met: []? Adjusted  3. Patient will demonstrate increased Strength by 1/2 mmt grade  and core activation to allow for proper functional mobility as indicated by patients Functional Deficits to allow pt to resume walking >/=  30-60 min and prn for shopping, going out in [place] without increase in symptoms. []? Progressing: []? Met: []? Not Met: []? Adjusted  4. 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 []? Progressing: []? Met: []? Not Met: []? Adjusted     Overall Progression Towards Functional goals/ Treatment Progress Update:  [] Patient is progressing as expected towards functional goals listed. [] Progression is slowed due to complexities/Impairments listed. [] Progression has been slowed due to co-morbidities. [x] Plan just implemented, too soon to assess goals progression <30days   [] Goals require adjustment due to lack of progress  [] Patient is not progressing as expected and requires additional follow up with physician  [] Other    Persisting Functional Limitations/Impairments:  [x]Sleeping [x]Sitting               [x]Standing [x]Transfers        [x]Walking []Kneeling               [x]Stairs [x]Squatting / bending   [x]ADLs [x]Reaching  [x]Lifting  [x]Housework  []Driving []Job related tasks  []Sports/Recreation []Other:        ASSESSMENT:   Pt cont to have difficulty doing HEP correctly.   Spent extra time re-instructing in how to do HEP correctly - had pt write notes in [place] on HEP handout  Treatment/Activity Tolerance:  [x] Patient able to complete tx [] Patient limited by fatigue  [] Patient limited by pain  [] Patient limited by other medical complications  [] Other:     Prognosis: [x] Good [] Fair  [] Poor    Patient Requires Follow-up: [x] Yes  [] No    PLAN:  strength, ROM/flexibility, posture and body mechs, manual, MOC, HEP, pt education     Frequency/Duration:   2 days per week for 6 Weeks:  Interventions:  [x]? Therapeutic exercise including:strength, ROM, flexibility  [x]? NMR activation and proprioception including postural re-education  [x]? Manual therapy as indicated to include: IASTM, STM, PROM, Gr I-IV mobilizations, manipulation. [x]? Modalities as needed that may include: thermal agents, E-stim, Biofeedback, US, iontophoresis as indicated  [x]? Patient education on joint protection, postural re-education, activity modification, progression of HEP. AQUATIC THERAPY     Plan for next treatment session: start with emphasis on mat table ex, manual, heat and progress to more fxnl/CKC activities as alix    PLAN: See eval. PT 2x / week for 6  weeks. [x] Continue per plan of care [] Alter current plan (see comments)  [] Plan of care initiated [] Hold pending MD visit [] Discharge    Electronically signed by: Jaja Owen, PT PT, DPT    Note: If patient does not return for scheduled/ recommended follow up visits, this note will serve as a discharge from care along with most recent update on progress.

## 2021-09-02 ENCOUNTER — HOSPITAL ENCOUNTER (OUTPATIENT)
Dept: PHYSICAL THERAPY | Age: 56
Setting detail: THERAPIES SERIES
Discharge: HOME | End: 2021-09-02
Payer: COMMERCIAL

## 2021-09-02 PROCEDURE — 97140 MANUAL THERAPY 1/> REGIONS: CPT

## 2021-09-02 PROCEDURE — 97112 NEUROMUSCULAR REEDUCATION: CPT

## 2021-09-02 PROCEDURE — 97110 THERAPEUTIC EXERCISES: CPT

## 2021-09-02 NOTE — FLOWSHEET NOTE
[facility]  Phone: [phone]   Fax: [phone]    Physical Therapy Daily Treatment Note  Date:  2021    Patient Name:  Jackelyn Talley    :  1965  MRN: [id number]  Medical/Treatment Diagnosis Information:  Diagnosis: Chronic bilateral low back pain without sciatica M54.5, G89.29  Treatment Diagnosis: poor habitual postures/body mechs, myofascial pain, strength and flexibility deficits contribute to pt's back and sciatica pain  Insurance/Certification information:  PT Insurance Information: [place] 30 visits soft limit. no auth  Physician Information:  Referring Practitioner: BARRY Quiñones  Plan of care signed (Y/N): []  Yes [x]  No     Date of Patient follow up with Physician:      Progress Report: []  Yes  [x]  No      Date Range for reporting period:  Beginnin/19  Ending:     Progress report due (10 Rx/or 30 days whichever is less): visit #52 or 851    Recertification due (POC duration/ or 90 days whichever is less): visit # or     Visit # Insurance Allowable Auth required? Date Range    30 []  Yes  [x]  No NA         Latex Allergy:  [x]NO      []YES  Preferred Language for Healthcare:   [x]English       []other:    Functional Scale:        Date assessed:  oswestry: raw score = 31, dysfunction = 62%, taken at initial eval    Pain level: 5/10 L medial knee, L post LE, L buttock. B LBP      SUBJECTIVE: back and buttock pain is better; now has L medial knee pain. Knee pain  gets better with walking/ nustep    OBJECTIVE: , , ,   used Ipad interpretor for [place]   palpate; tender L medial tibial condyle, L medial HS.    difficulty with TrA iso   Pt's son present for PT eval and tx. See eval      RESTRICTIONS/PRECAUTIONS: hx of covid 1 yr ago     Exercises/Interventions: PREFERS MAT EX for HEP BC THEY HELP WITH PAIN.  Per pt, low alix of standing ex, especially  for HEP    Therapeutic Exercises (86780) Resistance / level Sets Reps Notes   Nu step  L1, 6.5 min s=9, arms = 5       IB  3x30\"       HS Step stretch 2x30\" B       TG B squat with Tr A iso 15x             Stabs  TrA iso   8/31 required mod-max cues for correct performance   Mat exercises  SKTC  Fig 4 pirformis stretch in sitting  Supine piriformis stretch   Supine HS stretch    Used towel to assist   8/26: reviewed exercises                  Therapeutic Activities (89641)        Extensive pt ed on HEP - proper form and  activity levels,                                   Neuromuscular Re-ed (48052)       Seated SB   Wt shifts x 6: a/p, cw/ccw  R/L   15x ea except on ly 5x on R/L due to increased pain with R/L   CGA and B UE support                                      Manual Intervention (94300)       Roller to B lower quarter  x 9 min with pillows under shins       SIJ L ASIS higher, leg pull on L  STM to L ITB    8/31 deferred due to time spent re-instructing on HEP    8/26 Big leg length difference    MET Check pelvis and lumbar alignment next session as alix and consider MET                               Modalities: next session - heat prn    Pt. Education:  -8/31 review HEP - multiple cues for correct performance. Issued new handout with TrA iso and all ex. Re-explained all ex so that  pt could write notes in [place] in order  to remember how to do it correctly  8/19 patient and her son  Educated extensively on diagnosis, prognosis and expectations for rehab. Instructed pt on approp ex /activity intensity.   -all patient questions were answered    Home Exercise Program:  Access Code: [email or web address]  URL: [email or web address]. [email or web address]/  Date: 08/31/2021  Prepared by: Marky Pablo    Exercises  Supine Transversus Abdominis Bracing - Hands on Stomach - 2 x daily - 7 x weekly - 1 sets - 10 reps - 5 hold  Supine Piriformis Stretch with Foot on Ground - 2 x daily - 7 x weekly - 1 sets - 3 reps - 30 hold  Hooklying Single Knee to Chest Stretch - 2 x daily - 7 x weekly - 1 sets - 3 reps - 30 hold  Supine Hamstring Stretch with Strap - 1 x daily - 7 x weekly - 3 sets - 10 reps    Access Code: [email or web address]   URL: [email or web address]. [email or web address]/  Date: 08/19/2021  Prepared by: Scott Floxiao    Exercises  Supine Posterior Pelvic Tilt - 2 x daily - 7 x weekly - 1-2 sets - 10 reps - 5 hold  Supine Hamstring Stretch - 2 x daily - 7 x weekly - 1 sets - 3-5 reps - 20-30 hold  Hooklying Single Knee to Chest Stretch - 2 x daily - 7 x weekly - 1 sets - 3 reps - 30 hold  Supine Piriformis Stretch with Foot on Ground - 2 x daily - 7 x weekly - 1 sets - 3 reps - 30 hold      Therapeutic Exercise and NMR EXR  [x] (65089) Provided verbal/tactile cueing for activities related to strengthening, flexibility, endurance, ROM for improvements in  [x] LE / Lumbar: LE, proximal hip, and core control with self care, mobility, lifting, ambulation. [] UE / Cervical: cervical, postural, scapular, scapulothoracic and UE control with self care, reaching, carrying, lifting, house/yardwork, driving, computer work.  [] (23909) Provided verbal/tactile cueing for activities related to improving balance, coordination, kinesthetic sense, posture, motor skill, proprioception to assist with   [] LE / lumbar: LE, proximal hip, and core control in self care, mobility, lifting, ambulation and eccentric single leg control. [] UE / cervical: cervical, scapular, scapulothoracic and UE control with self care, reaching, carrying, lifting, house/yardwork, driving, computer work.   [] (70559) Therapist is in constant attendance of 2 or more patients providing skilled therapy interventions, but not providing any significant amount of measurable one-on-one time to either patient, for improvements in  [] LE / lumbar: LE, proximal hip, and core control in self care, mobility, lifting, ambulation and eccentric single leg control.    [] UE / cervical: cervical, scapular, scapulothoracic and UE control with self care, reaching, carrying, lifting, house/yardwork, driving, computer work. NMR and Therapeutic Activities:    [x] (86949 or 50883) Provided verbal/tactile cueing for activities related to improving balance, coordination, kinesthetic sense, posture, motor skill, proprioception and motor activation to allow for proper function of   [x] LE: / Lumbar core, proximal hip and LE with self care and ADLs  [] UE / Cervical: cervical, postural, scapular, scapulothoracic and UE control with self care, carrying, lifting, driving, computer work.   [] (25852) Gait Re-education- Provided training and instruction to the patient for proper LE, core and proximal hip recruitment and positioning and eccentric body weight control with ambulation re-education including up and down stairs     Home Management Training / Self Care:  [] (60852) Provided self-care/home management training related to activities of daily living and compensatory training, and/or use of adaptive equipment for improvement with: ADLs and compensatory training, meal preparation, safety procedures and instruction in use of adaptive equipment, including bathing, grooming, dressing, personal hygiene, basic household cleaning and chores.      Home Exercise Program:    [x] (58422) Reviewed/Progressed HEP activities related to strengthening, flexibility, endurance, ROM of   [] LE / Lumbar: core, proximal hip and LE for functional self-care, mobility, lifting and ambulation/stair navigation   [] UE / Cervical: cervical, postural, scapular, scapulothoracic and UE control with self care, reaching, carrying, lifting, house/yardwork, driving, computer work  [] (78529)Reviewed/Progressed HEP activities related to improving balance, coordination, kinesthetic sense, posture, motor skill, proprioception of   [] LE: core, proximal hip and LE for self care, mobility, lifting, and ambulation/stair navigation    [] UE / Cervical: cervical, postural,  scapular, scapulothoracic and UE control with self care, reaching, carrying, lifting, house/yardwork, driving, computer work    Manual Treatments:  PROM / STM / Oscillations-Mobs:  G-I, II, III, IV (PA's, Inf., Post.)  [x] (81292) Provided manual therapy to mobilize LE, proximal hip and/or LS spine soft tissue/joints for the purpose of modulating pain, promoting relaxation,  increasing ROM, reducing/eliminating soft tissue swelling/inflammation/restriction, improving soft tissue extensibility and allowing for proper ROM for normal function with   [x] LE / lumbar: self care, mobility, lifting and ambulation. [] UE / Cervical: self care, reaching, carrying, lifting, house/yardwork, driving, computer work. Modalities:  [] (74025) Vasopneumatic compression: Utilized vasopneumatic compression to decrease edema / swelling for the purpose of improving mobility and quad tone / recruitment which will allow for increased overall function including but not limited to self-care, transfers, ambulation, and ascending / descending stairs. Charges:  Timed Code Treatment Minutes: 41   Total Treatment Minutes: 41     [] EVAL - LOW (64681)   [] EVAL - MOD (07003)  [] EVAL - HIGH (51973)  [] RE-EVAL (27350)  [x] WT(58313) x  1    [] Ionto  [x] NMR (82881) x 1     [] Vaso  [x] Manual (64823) x    1   [] Ultrasound  [] TA x   2    [] Mech Traction (52874)  [] Aquatic Therapy x     [] ES (un) (04771):   [] Home Management Training x 2  [] ES(attended) (78850)   [] Dry Needling 1-2 muscles (76857):  [] Dry Needling 3+ muscles (267496)  [] Group:      [] Other:     GOALS:  Patient stated goal: less pain  []? Progressing: []? Met: []? Not Met: []? Adjusted     Therapist goals for Patient:   Short Term Goals: To be achieved in: 2 weeks  1. Independent in HEP and progression per patient tolerance, in order to prevent re-injury. []? Progressing: []? Met: []? Not Met: []? Adjusted  2.  Patient will have a decrease in pain to facilitate improvement in movement, function, and ADLs as indicated by improvement with respect to Functional Deficits. []? Progressing: []? Met: []? Not Met: []? Adjusted     Long Term Goals: To be achieved in:6 weeks  1. Disability index score of 20 % or less on the   to assist with reaching prior level of function. []? Progressing: []? Met: []? Not Met: []? Adjusted  2. Patient will demonstrate increased AROM  to [facility], to allow for proper joint functioning to allow pt to resume ADLS/IADLS, home chores, lifting with good posture and body mechs all  without increase in symptoms. []? Progressing: []? Met: []? Not Met: []? Adjusted  3. Patient will demonstrate increased Strength by 1/2 mmt grade  and core activation to allow for proper functional mobility as indicated by patients Functional Deficits to allow pt to resume walking >/=  30-60 min and prn for shopping, going out in Community without increase in symptoms. []? Progressing: []? Met: []? Not Met: []? Adjusted  4. 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 []? Progressing: []? Met: []? Not Met: []? Adjusted     Overall Progression Towards Functional goals/ Treatment Progress Update:  [x] Patient is progressing as expected towards functional goals listed. [] Progression is slowed due to complexities/Impairments listed. [] Progression has been slowed due to co-morbidities.   [] Plan just implemented, too soon to assess goals progression <30days   [] Goals require adjustment due to lack of progress  [] Patient is not progressing as expected and requires additional follow up with physician  [] Other    Persisting Functional Limitations/Impairments:  [x]Sleeping [x]Sitting               [x]Standing [x]Transfers        [x]Walking []Kneeling               [x]Stairs [x]Squatting / bending   [x]ADLs [x]Reaching  [x]Lifting  [x]Housework  []Driving []Job related tasks  []Sports/Recreation []Other:        ASSESSMENT:   9/2 improved ex alix and decreased pain. STM to myofascial pain after tx helps with pain management  8/31 Pt cont to have difficulty doing HEP correctly. Spent extra time re-instructing in how to do HEP correctly - had pt write notes in [place] on HEP handout  Treatment/Activity Tolerance:  [x] Patient able to complete tx [] Patient limited by fatigue  [] Patient limited by pain  [] Patient limited by other medical complications  [] Other:     Prognosis: [x] Good [] Fair  [] Poor    Patient Requires Follow-up: [x] Yes  [] No    PLAN:  strength, ROM/flexibility, posture and body mechs, manual, MOC, HEP, pt education     Frequency/Duration:   2 days per week for 6 Weeks:  Interventions:  [x]? Therapeutic exercise including:strength, ROM, flexibility  [x]? NMR activation and proprioception including postural re-education  [x]? Manual therapy as indicated to include: IASTM, STM, PROM, Gr I-IV mobilizations, manipulation. [x]? Modalities as needed that may include: thermal agents, E-stim, Biofeedback, US, iontophoresis as indicated  [x]? Patient education on joint protection, postural re-education, activity modification, progression of HEP. AQUATIC THERAPY     Plan for next treatment session: start with emphasis on mat table ex, manual, heat and progress to more fxnl/CKC activities as alix    PLAN: See eval. PT 2x / week for 6  weeks. [x] Continue per plan of care [] Alter current plan (see comments)  [] Plan of care initiated [] Hold pending MD visit [] Discharge    Electronically signed by: Bean Mon, PT PT, DPT    Note: If patient does not return for scheduled/ recommended follow up visits, this note will serve as a discharge from care along with most recent update on progress.

## 2021-09-07 ENCOUNTER — HOSPITAL ENCOUNTER (OUTPATIENT)
Dept: PHYSICAL THERAPY | Age: 56
Setting detail: THERAPIES SERIES
Discharge: HOME | End: 2021-09-07
Payer: COMMERCIAL

## 2021-09-07 PROCEDURE — 97530 THERAPEUTIC ACTIVITIES: CPT

## 2021-09-07 PROCEDURE — 97110 THERAPEUTIC EXERCISES: CPT

## 2021-09-07 NOTE — FLOWSHEET NOTE
[address] [facility]  Phone: [phone]   Fax: [phone]    Physical Therapy Daily Treatment Note  Date:  2021    Patient Name:  Charo Box) :  1965  MRN: [id number]  Medical/Treatment Diagnosis Information:  Diagnosis: Chronic bilateral low back pain without sciatica M54.5, G89.29  Treatment Diagnosis: poor habitual postures/body mechs, myofascial pain, strength and flexibility deficits contribute to pt's back and sciatica pain  Insurance/Certification information:  PT Insurance Information: [place] 30 visits soft limit. no auth  Physician Information:  Referring Practitioner: BARRY Anthony  Plan of care signed (Y/N): []  Yes [x]  No     Date of Patient follow up with Physician:      Progress Report: []  Yes  [x]  No      Date Range for reporting period:  Beginnin/19  Ending:     Progress report due (10 Rx/or 30 days whichever is less): visit #45 or 621    Recertification due (POC duration/ or 90 days whichever is less): visit # or     Visit # Insurance Allowable Auth required? Date Range    30 []  Yes  [x]  No NA         Latex Allergy:  [x]NO      []YES  Preferred Language for Healthcare:   [x]English       []other:    Functional Scale:        Date assessed:  oswestry: raw score = 31, dysfunction = 62%, taken at initial eval    Pain level: 7/10  B LBP      SUBJECTIVE:  no knee pain today. Does have LBP today. Had L ankle pain on wkd but no ankle pain today. No L LE pain today.  back and buttock pain is better; now has L medial knee pain. Knee pain  gets better with walking/ nustep    OBJECTIVE: , , , ,   used Ipad interpretor for Yoruba   palpate; tender L medial tibial condyle, L medial HS.    difficulty with TrA iso   Pt's son present for PT eval and tx.  See eval      RESTRICTIONS/PRECAUTIONS: hx of covid 1 yr ago     Exercises/Interventions: PREFERS MAT EX for HEP BC THEY HELP WITH PAIN. Per pt, low alix of standing ex, especially  for HEP    Therapeutic Exercises (96304) Resistance / level Sets Reps Notes   Nu step  L1, min s=9, arms = 5       IB  3x30\"       HS Step stretch 2x30\" B      TG B squat with Tr A iso              Stabs  TrA iso 10x5\"  8/31 required mod-max cues for correct performance   Mat exercises  SKTC  Fig 4 pirformis stretch in sitting  Supine piriformis stretch   Supine HS stretch    Used towel to assist   8/26: reviewed exercises    Pelvic bridge  1x5   Just lifting bottom off of the table           Therapeutic Activities (28583)        Extensive pt ed on HEP - proper form and  activity levels,       TA in standing (against wall) 10x5\"      Sitting posture- instructed in pelvic neutral positioning. 12 min   9/7 instructed patient to use towel roll in sitting at lordosis curve and pillow below feet to maintain appropriate body mechanics in the car and sitting in a chair at home. Patient required extensive TC and VC in order to understand exercise with multiple reps in order for her to be able to perform at home. Standing at counter  12 min   9/7 instructed patient to rest feet inside of counter to shift position of low back when standing for longer periods of time. Patient required extensive TC and VC in order to understand exercise with multiple reps in order for her to be able to perform at home. Neuromuscular Re-ed (23027)       Seated SB   Wt shifts x 6: a/p, cw/ccw  R/L    CGA and B UE support                                      Manual Intervention (08324)       Roller to B lower quarter       SIJ L ASIS higher, leg pull on L  STM to L ITB    8/31 deferred due to time spent re-instructing on HEP    8/26 Big leg length difference    MET                                Modalities: next session - heat prn    Pt.  Education: 9/7: reviewed HEP, patient was able to take notes in [place] in addition to drawing pictures on her HEP printouts so she is able to easily perform them at home.   -8/31 review HEP - multiple cues for correct performance. Issued new handout with TrA iso and all ex. Re-explained all ex so that  pt could write notes in [place] in order  to remember how to do it correctly  8/19 patient and her son  Educated extensively on diagnosis, prognosis and expectations for rehab. Instructed pt on approp ex /activity intensity.   -all patient questions were answered    Home Exercise Program:  9/7 pt instructed in pelvic neutral and putting one foot on inside of cabinet to reduce pain while standing in kitchen to cook and wash dishes. Upright sitting posture with lumbar roll and feet on folded pillow (used as stool)  Access Code: [email or web address]  URL: [email or web address]/  Date: 08/31/2021  Prepared by: Erenest Baptise    Exercises  Supine Transversus Abdominis Bracing - Hands on Stomach - 2 x daily - 7 x weekly - 1 sets - 10 reps - 5 hold  Supine Piriformis Stretch with Foot on Ground - 2 x daily - 7 x weekly - 1 sets - 3 reps - 30 hold  Hooklying Single Knee to Chest Stretch - 2 x daily - 7 x weekly - 1 sets - 3 reps - 30 hold  Supine Hamstring Stretch with Strap - 1 x daily - 7 x weekly - 3 sets - 10 reps    Access Code: [email or web address]   URL: [email or web address]  Date: 08/19/2021  Prepared by: Erenest Baptise    Exercises  Supine Posterior Pelvic Tilt - 2 x daily - 7 x weekly - 1-2 sets - 10 reps - 5 hold  Supine Hamstring Stretch - 2 x daily - 7 x weekly - 1 sets - 3-5 reps - 20-30 hold  Hooklying Single Knee to Chest Stretch - 2 x daily - 7 x weekly - 1 sets - 3 reps - 30 hold  Supine Piriformis Stretch with Foot on Ground - 2 x daily - 7 x weekly - 1 sets - 3 reps - 30 hold      Therapeutic Exercise and NMR EXR  [x] (47515) Provided verbal/tactile cueing for activities related to strengthening, flexibility, endurance, ROM for improvements in  [x] LE / Lumbar: LE, proximal hip, and core control with self care, mobility, lifting, ambulation. [] UE / Cervical: cervical, postural, scapular, scapulothoracic and UE control with self care, reaching, carrying, lifting, house/yardwork, driving, computer work.  [] (78375) Provided verbal/tactile cueing for activities related to improving balance, coordination, kinesthetic sense, posture, motor skill, proprioception to assist with   [] LE / lumbar: LE, proximal hip, and core control in self care, mobility, lifting, ambulation and eccentric single leg control. [] UE / cervical: cervical, scapular, scapulothoracic and UE control with self care, reaching, carrying, lifting, house/yardwork, driving, computer work.   [] (57966) Therapist is in constant attendance of 2 or more patients providing skilled therapy interventions, but not providing any significant amount of measurable one-on-one time to either patient, for improvements in  [] LE / lumbar: LE, proximal hip, and core control in self care, mobility, lifting, ambulation and eccentric single leg control. [] UE / cervical: cervical, scapular, scapulothoracic and UE control with self care, reaching, carrying, lifting, house/yardwork, driving, computer work.      NMR and Therapeutic Activities:    [x] (82371 or 04730) Provided verbal/tactile cueing for activities related to improving balance, coordination, kinesthetic sense, posture, motor skill, proprioception and motor activation to allow for proper function of   [x] LE: / Lumbar core, proximal hip and LE with self care and ADLs  [] UE / Cervical: cervical, postural, scapular, scapulothoracic and UE control with self care, carrying, lifting, driving, computer work.   [] (66268) Gait Re-education- Provided training and instruction to the patient for proper LE, core and proximal hip recruitment and positioning and eccentric body weight control with ambulation re-education including up and down stairs     Home Management Training / Self Care:  [] (56100) Provided self-care/home management training related to activities of daily living and compensatory training, and/or use of adaptive equipment for improvement with: ADLs and compensatory training, meal preparation, safety procedures and instruction in use of adaptive equipment, including bathing, grooming, dressing, personal hygiene, basic household cleaning and chores. Home Exercise Program:    [x] (36014) Reviewed/Progressed HEP activities related to strengthening, flexibility, endurance, ROM of   [] LE / Lumbar: core, proximal hip and LE for functional self-care, mobility, lifting and ambulation/stair navigation   [] UE / Cervical: cervical, postural, scapular, scapulothoracic and UE control with self care, reaching, carrying, lifting, house/yardwork, driving, computer work  [] (29790)Reviewed/Progressed HEP activities related to improving balance, coordination, kinesthetic sense, posture, motor skill, proprioception of   [] LE: core, proximal hip and LE for self care, mobility, lifting, and ambulation/stair navigation    [] UE / Cervical: cervical, postural,  scapular, scapulothoracic and UE control with self care, reaching, carrying, lifting, house/yardwork, driving, computer work    Manual Treatments:  PROM / STM / Oscillations-Mobs:  G-I, II, III, IV (PA's, Inf., Post.)  [x] (39140) Provided manual therapy to mobilize LE, proximal hip and/or LS spine soft tissue/joints for the purpose of modulating pain, promoting relaxation,  increasing ROM, reducing/eliminating soft tissue swelling/inflammation/restriction, improving soft tissue extensibility and allowing for proper ROM for normal function with   [x] LE / lumbar: self care, mobility, lifting and ambulation. [] UE / Cervical: self care, reaching, carrying, lifting, house/yardwork, driving, computer work.      Modalities:  [] (42079) Vasopneumatic compression: Utilized vasopneumatic compression to decrease edema / swelling for the purpose of improving mobility and quad tone / recruitment which will allow for increased overall function including but not limited to self-care, transfers, ambulation, and ascending / descending stairs. Charges:  Timed Code Treatment Minutes: 45   Total Treatment Minutes: 45     [] EVAL - LOW (89749)   [] EVAL - MOD (55171)  [] EVAL - HIGH (30529)  [] RE-EVAL (34236)  [x] JW(56702) x  1    [] Ionto  [] NMR (22121) x 1     [] Vaso  [] Manual (90191) x    1   [] Ultrasound  [x] TA x   2    [] Mech Traction (82769)  [] Aquatic Therapy x     [] ES (un) (98550):   [] Home Management Training x 2  [] ES(attended) (48972)   [] Dry Needling 1-2 muscles (19880):  [] Dry Needling 3+ muscles (207395)  [] Group:      [] Other:     GOALS:  Patient stated goal: less pain  []? Progressing: []? Met: []? Not Met: []? Adjusted     Therapist goals for Patient:   Short Term Goals: To be achieved in: 2 weeks  1. Independent in HEP and progression per patient tolerance, in order to prevent re-injury. []? Progressing: []? Met: []? Not Met: []? Adjusted  2. Patient will have a decrease in pain to facilitate improvement in movement, function, and ADLs as indicated by improvement with respect to Functional Deficits. []? Progressing: []? Met: []? Not Met: []? Adjusted     Long Term Goals: To be achieved in:6 weeks  1. Disability index score of 20 % or less on the   to assist with reaching prior level of function. []? Progressing: []? Met: []? Not Met: []? Adjusted  2. Patient will demonstrate increased AROM  to [facility], to allow for proper joint functioning to allow pt to resume ADLS/IADLS, home chores, lifting with good posture and body mechs all  without increase in symptoms. []? Progressing: []? Met: []? Not Met: []? Adjusted  3.  Patient will demonstrate increased Strength by 1/2 mmt grade  and core activation to allow for proper functional mobility as indicated by patients Functional Deficits to allow pt to resume walking >/=  30-60 min and prn for shopping, going out in [employer] without increase in symptoms. []? Progressing: []? Met: []? Not Met: []? Adjusted  4. 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 []? Progressing: []? Met: []? Not Met: []? Adjusted     Overall Progression Towards Functional goals/ Treatment Progress Update:  [x] Patient is progressing as expected towards functional goals listed. [] Progression is slowed due to complexities/Impairments listed. [] Progression has been slowed due to co-morbidities. [] Plan just implemented, too soon to assess goals progression <30days   [] Goals require adjustment due to lack of progress  [] Patient is not progressing as expected and requires additional follow up with physician  [] Other    Persisting Functional Limitations/Impairments:  [x]Sleeping [x]Sitting               [x]Standing [x]Transfers        [x]Walking []Kneeling               [x]Stairs [x]Squatting / bending   [x]ADLs [x]Reaching  [x]Lifting  [x]Housework  []Driving []Job related tasks  []Sports/Recreation []Other:        ASSESSMENT:   9/7: Improved ex tolerance and decreased pain in legs, pain has centralized to low back. Patient continues to make core-strength stabilization gains and desired to work towards standing and sitting for longer periods of time. 9/2 improved ex alix and decreased pain. STM to myofascial pain after tx helps with pain management  8/31 Pt cont to have difficulty doing HEP correctly.   Spent extra time re-instructing in how to do HEP correctly - had pt write notes in [place] on HEP handout  Treatment/Activity Tolerance:  [x] Patient able to complete tx [] Patient limited by fatigue  [] Patient limited by pain  [] Patient limited by other medical complications  [] Other:     Prognosis: [x] Good [] Fair  [] Poor    Patient Requires Follow-up: [x] Yes  [] No    PLAN:  strength, ROM/flexibility, posture and body mechs, manual, MOC, HEP, pt education     Frequency/Duration:   2 days per week for 6 Weeks:  Interventions:  [x]? Therapeutic exercise including:strength, ROM, flexibility  [x]? NMR activation and proprioception including postural re-education  [x]? Manual therapy as indicated to include: IASTM, STM, PROM, Gr I-IV mobilizations, manipulation. [x]? Modalities as needed that may include: thermal agents, E-stim, Biofeedback, US, iontophoresis as indicated  [x]? Patient education on joint protection, postural re-education, activity modification, progression of HEP. AQUATIC THERAPY     Plan for next treatment session: start with emphasis on mat table ex, manual, heat and progress to more fxnl/CKC activities as alix    PLAN: See eval. PT 2x / week for 6  weeks. [x] Continue per plan of care [] Alter current plan (see comments)  [] Plan of care initiated [] Hold pending MD visit [] Discharge    Electronically signed by: Miri Lara, PT, DPT  Therapist was present, directed the patient's care, made skilled judgement, and was responsible for assessment and treatment of the patient. Rudy Gaston, SPT      Note: If patient does not return for scheduled/ recommended follow up visits, this note will serve as a discharge from care along with most recent update on progress.

## 2021-09-09 ENCOUNTER — HOSPITAL ENCOUNTER (OUTPATIENT)
Dept: PHYSICAL THERAPY | Age: 56
Setting detail: THERAPIES SERIES
Discharge: HOME | End: 2021-09-09
Payer: COMMERCIAL

## 2021-09-09 PROCEDURE — 97140 MANUAL THERAPY 1/> REGIONS: CPT

## 2021-09-09 PROCEDURE — 97110 THERAPEUTIC EXERCISES: CPT

## 2021-09-09 PROCEDURE — 97530 THERAPEUTIC ACTIVITIES: CPT

## 2021-09-09 NOTE — FLOWSHEET NOTE
[facility] Therapy  Phone: [phone]   Fax: [phone]    Physical Therapy Daily Treatment Note  Date:  2021    Patient Name:  Jackelyn Talley   (Aida Keen) :  1965  MRN: [id number]  Medical/Treatment Diagnosis Information:  Diagnosis: Chronic bilateral low back pain without sciatica M54.5, G89.29  Treatment Diagnosis: poor habitual postures/body mechs, myofascial pain, strength and flexibility deficits contribute to pt's back and sciatica pain  Insurance/Certification information:  PT Insurance Information: [place] 30 visits soft limit. no auth  Physician Information:  Referring Practitioner: BARRY Quiñones  Plan of care signed (Y/N): []  Yes [x]  No     Date of Patient follow up with Physician:      Progress Report: []  Yes  [x]  No      Date Range for reporting period:  Beginnin/19  Ending:     Progress report due (10 Rx/or 30 days whichever is less): visit #12 or 014    Recertification due (POC duration/ or 90 days whichever is less): visit #12 or     Visit # Insurance Allowable Auth required? Date Range    30 []  Yes  [x]  No NA         Latex Allergy:  [x]NO      []YES  Preferred Language for Healthcare:   [x]English       []other:    Functional Scale:        Date assessed:  oswestry: raw score = 31, dysfunction = 62%, taken at initial eval    Pain level: 7-8/10  B LBP, R knee , R gluteal area        SUBJECTIVE: : Patient has increased pain today going from low back into R gluteal region and R knee. Patient has increased the amount of time she is standing in the kitchen and suspects that could bethe potential cause of her increase in pain. Patient reports that she is performing her HEP as often as she can.    no knee pain today. Does have LBP today. Had L ankle pain on wkd but no ankle pain today. No L LE pain today.  back and buttock pain is better; now has L medial knee pain.   Knee pain  gets better with walking/ nustep    OBJECTIVE: 9/9 9/7, 9/2, 8/31, 8/26,  8/19 used Ipad interpretor for [place]  9/2 palpate; tender L medial tibial condyle, L medial HS.   8/31 difficulty with TrA iso  8/19 Pt's son present for PT eval and tx. See eval      RESTRICTIONS/PRECAUTIONS: hx of covid 1 yr ago     Exercises/Interventions: PREFERS MAT EX for HEP BC THEY HELP WITH PAIN. Per pt, low alix of standing ex, especially  for HEP    Therapeutic Exercises (06801) Resistance / level Sets Reps Notes   Nu step  L1, min s=9, arms = 5       IB  3x30\"       HS Step stretch 2x30\" B      TG B squat with Tr A iso              Stabs  TrA iso   8/31 required mod-max cues for correct performance   Mat exercises  SKTC  Fig 4 pirformis stretch in sitting  Supine piriformis stretch   Supine HS stretch    Used towel to assist   8/26: reviewed exercises    Pelvic bridge  1x5   Just lifting bottom off of the table           Therapeutic Activities (81832)       See PT education section below 15 min       Extensive pt ed on HEP - proper form and  activity levels,       TA in standing (against wall)       Sitting posture- instructed in pelvic neutral positioning. 9/7 instructed patient to use towel roll in sitting at lordosis curve and pillow below feet to maintain appropriate body mechanics in the car and sitting in a chair at home. Patient required extensive TC and VC in order to understand exercise with multiple reps in order for her to be able to perform at home. Standing at counter     9/7 instructed patient to rest feet inside of counter to shift position of low back when standing for longer periods of time. Patient required extensive TC and VC in order to understand exercise with multiple reps in order for her to be able to perform at home.              Neuromuscular Re-ed (35060)       Seated SB   Wt shifts x 6: a/p, cw/ccw  R/L    CGA and B UE support   Balance exercises  When pt able to tolerate Manual Intervention (52801)       Roller to B lower quarter, manual stretching to hamstrings, rectus femoris, and piriformis. in MAURY position:  X 15 min with pillows under shins       SIJ L ASIS higher, leg pull on L  STM to L ITB    8/31 deferred due to time spent re-instructing on HEP    8/26 Big leg length difference    MET                                Modalities: next session - heat prn    Pt. Education:  9/9: reviewed HEP. Educated patient on the importance of  feet and using a wide base of support to reduce fear and risk of falling. Patient was educated to record the amount of time she stands per day to estimate what her personal limits are with standing  while completing ADL's and IADL's. Patient has agreed to do this and have her children help her record the amount of time she is on her feet each day. 9/7: reviewed HEP, patient was able to take notes in [place] in addition to drawing pictures on her HEP printouts so she is able to easily perform them at home.   -8/31 review HEP - multiple cues for correct performance. Issued new handout with TrA iso and all ex. Re-explained all ex so that  pt could write notes in [place] in order  to remember how to do it correctly  8/19 patient and her son  Educated extensively on diagnosis, prognosis and expectations for rehab. Instructed pt on approp ex /activity intensity.   -all patient questions were answered    Home Exercise Program: 9/9 Patient continues to perform HEP and will record the amount of time she stands per to in order to learn what her standing limit is per day. 9/7 pt instructed in pelvic neutral and putting one foot on inside of cabinet to reduce pain while standing in kitchen to cook and wash dishes. Upright sitting posture with lumbar roll and feet on folded pillow (used as stool)  Access Code: [email or web address]  URL: [email or web address]  Date: 08/31/2021  Prepared by: Dontae Montenegro    Exercises  Supine Transversus Abdominis Bracing - Hands on Stomach - 2 x daily - 7 x weekly - 1 sets - 10 reps - 5 hold  Supine Piriformis Stretch with Foot on Ground - 2 x daily - 7 x weekly - 1 sets - 3 reps - 30 hold  Hooklying Single Knee to Chest Stretch - 2 x daily - 7 x weekly - 1 sets - 3 reps - 30 hold  Supine Hamstring Stretch with Strap - 1 x daily - 7 x weekly - 3 sets - 10 reps    Access Code: [email or web address]   URL: [email or web address]  Date: 08/19/2021  Prepared by: Mic Franco    Exercises  Supine Posterior Pelvic Tilt - 2 x daily - 7 x weekly - 1-2 sets - 10 reps - 5 hold  Supine Hamstring Stretch - 2 x daily - 7 x weekly - 1 sets - 3-5 reps - 20-30 hold  Hooklying Single Knee to Chest Stretch - 2 x daily - 7 x weekly - 1 sets - 3 reps - 30 hold  Supine Piriformis Stretch with Foot on Ground - 2 x daily - 7 x weekly - 1 sets - 3 reps - 30 hold      Therapeutic Exercise and NMR EXR  [x] (41937) Provided verbal/tactile cueing for activities related to strengthening, flexibility, endurance, ROM for improvements in  [x] LE / Lumbar: LE, proximal hip, and core control with self care, mobility, lifting, ambulation. [] UE / Cervical: cervical, postural, scapular, scapulothoracic and UE control with self care, reaching, carrying, lifting, house/yardwork, driving, computer work.  [] (38958) Provided verbal/tactile cueing for activities related to improving balance, coordination, kinesthetic sense, posture, motor skill, proprioception to assist with   [] LE / lumbar: LE, proximal hip, and core control in self care, mobility, lifting, ambulation and eccentric single leg control.    [] UE / cervical: cervical, scapular, scapulothoracic and UE control with self care, reaching, carrying, lifting, house/yardwork, driving, computer work.   [] (15777) Therapist is in constant attendance of 2 or more patients providing skilled therapy interventions, but not providing any significant amount of measurable one-on-one time to either patient, for improvements in  [] LE / lumbar: LE, proximal hip, and core control in self care, mobility, lifting, ambulation and eccentric single leg control. [] UE / cervical: cervical, scapular, scapulothoracic and UE control with self care, reaching, carrying, lifting, house/yardwork, driving, computer work. NMR and Therapeutic Activities:    [x] (41128 or 58226) Provided verbal/tactile cueing for activities related to improving balance, coordination, kinesthetic sense, posture, motor skill, proprioception and motor activation to allow for proper function of   [x] LE: / Lumbar core, proximal hip and LE with self care and ADLs  [] UE / Cervical: cervical, postural, scapular, scapulothoracic and UE control with self care, carrying, lifting, driving, computer work.   [] (32567) Gait Re-education- Provided training and instruction to the patient for proper LE, core and proximal hip recruitment and positioning and eccentric body weight control with ambulation re-education including up and down stairs     Home Management Training / Self Care:  [] (14694) Provided self-care/home management training related to activities of daily living and compensatory training, and/or use of adaptive equipment for improvement with: ADLs and compensatory training, meal preparation, safety procedures and instruction in use of adaptive equipment, including bathing, grooming, dressing, personal hygiene, basic household cleaning and chores.      Home Exercise Program:    [x] (22666) Reviewed/Progressed HEP activities related to strengthening, flexibility, endurance, ROM of   [] LE / Lumbar: core, proximal hip and LE for functional self-care, mobility, lifting and ambulation/stair navigation   [] UE / Cervical: cervical, postural, scapular, scapulothoracic and UE control with self care, reaching, carrying, lifting, house/yardwork, driving, computer work  [] (22134)Reviewed/Progressed HEP activities related to improving balance, coordination, kinesthetic sense, posture, motor skill, proprioception of   [] LE: core, proximal hip and LE for self care, mobility, lifting, and ambulation/stair navigation    [] UE / Cervical: cervical, postural,  scapular, scapulothoracic and UE control with self care, reaching, carrying, lifting, house/yardwork, driving, computer work    Manual Treatments:  PROM / STM / Oscillations-Mobs:  G-I, II, III, IV (PA's, Inf., Post.)  [x] (92663) Provided manual therapy to mobilize LE, proximal hip and/or LS spine soft tissue/joints for the purpose of modulating pain, promoting relaxation,  increasing ROM, reducing/eliminating soft tissue swelling/inflammation/restriction, improving soft tissue extensibility and allowing for proper ROM for normal function with   [x] LE / lumbar: self care, mobility, lifting and ambulation. [] UE / Cervical: self care, reaching, carrying, lifting, house/yardwork, driving, computer work. Modalities:  [] (20158) Vasopneumatic compression: Utilized vasopneumatic compression to decrease edema / swelling for the purpose of improving mobility and quad tone / recruitment which will allow for increased overall function including but not limited to self-care, transfers, ambulation, and ascending / descending stairs. Charges:  Timed Code Treatment Minutes: 45   Total Treatment Minutes: 45     [] EVAL - LOW (55500)   [] EVAL - MOD (94053)  [] EVAL - HIGH (99017)  [] RE-EVAL (25703)  [x] EQ(95567) x  1    [] Ionto  [] NMR (97722) x 1     [] Vaso  [x] Manual (13043) x    1   [] Ultrasound  [x] TA x   1    [] Mech Traction (71551)  [] Aquatic Therapy x     [] ES (un) (51569):   [] Home Management Training x 2  [] ES(attended) (31086)   [] Dry Needling 1-2 muscles (61387):  [] Dry Needling 3+ muscles (514077)  [] Group:      [] Other:     GOALS:  Patient stated goal: less pain  []? Progressing: []? Met: []? Not Met: []? Adjusted     Therapist goals for Patient:   Short Term Goals:  To be achieved in: 2 weeks  1. Independent in HEP and progression per patient tolerance, in order to prevent re-injury. []? Progressing: []? Met: []? Not Met: []? Adjusted  2. Patient will have a decrease in pain to facilitate improvement in movement, function, and ADLs as indicated by improvement with respect to Functional Deficits. []? Progressing: []? Met: []? Not Met: []? Adjusted     Long Term Goals: To be achieved in:6 weeks  1. Disability index score of 20 % or less on the   to assist with reaching prior level of function. []? Progressing: []? Met: []? Not Met: []? Adjusted  2. Patient will demonstrate increased AROM  to [facility], to allow for proper joint functioning to allow pt to resume ADLS/IADLS, home chores, lifting with good posture and body mechs all  without increase in symptoms. []? Progressing: []? Met: []? Not Met: []? Adjusted  3. Patient will demonstrate increased Strength by 1/2 mmt grade  and core activation to allow for proper functional mobility as indicated by patients Functional Deficits to allow pt to resume walking >/=  30-60 min and prn for shopping, going out in Community without increase in symptoms. []? Progressing: []? Met: []? Not Met: []? Adjusted  4. 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 []? Progressing: []? Met: []? Not Met: []? Adjusted     Overall Progression Towards Functional goals/ Treatment Progress Update:  [x] Patient is progressing as expected towards functional goals listed. [] Progression is slowed due to complexities/Impairments listed. [] Progression has been slowed due to co-morbidities.   [] Plan just implemented, too soon to assess goals progression <30days   [] Goals require adjustment due to lack of progress  [] Patient is not progressing as expected and requires additional follow up with physician  [] Other    Persisting Functional Limitations/Impairments:  [x]Sleeping [x]Sitting               [x]Standing [x]Transfers        [x]Walking []Kneeling               [x]Stairs [x]Squatting / bending   [x]ADLs [x]Reaching  [x]Lifting  [x]Housework  []Driving []Job related tasks  []Sports/Recreation []Other:        ASSESSMENT:   9/9: Patient unable to tolerate full gym program today due to increase in pain. Patient continues to make core stabilization gains and utilize appropriate body mechanics to minimize fear and risk of falling while also striving for increased standing time at home in order to complete ADL's and IADL's. Patient expressed concern over numbness and tingling in R UE. Patient will be visiting her PCP and wants to discuss the option of receiving occupational therapy to help with her R hand and UE deficits/pain. 9/7: Improved ex tolerance and decreased pain in legs, pain has centralized to low back. Patient continues to make core-strength stabilization gains and desired to work towards standing and sitting for longer periods of time. 9/2 improved ex alix and decreased pain. STM to myofascial pain after tx helps with pain management  8/31 Pt cont to have difficulty doing HEP correctly. Spent extra time re-instructing in how to do HEP correctly - had pt write notes in [place] on HEP handout  Treatment/Activity Tolerance:  [x] Patient able to complete tx [] Patient limited by fatigue  [] Patient limited by pain  [] Patient limited by other medical complications  [] Other:     Prognosis: [x] Good [] Fair  [] Poor    Patient Requires Follow-up: [x] Yes  [] No    PLAN:  strength, ROM/flexibility, posture and body mechs, manual, MOC, HEP, pt education     Frequency/Duration:   2 days per week for 6 Weeks:  Interventions:  [x]? Therapeutic exercise including:strength, ROM, flexibility  [x]? NMR activation and proprioception including postural re-education  [x]?   Manual therapy as indicated to include: IASTM, STM, PROM, Gr I-IV mobilizations, manipulation. [x]? Modalities as needed that may include: thermal agents, E-stim, Biofeedback, US, iontophoresis as indicated  [x]? Patient education on joint protection, postural re-education, activity modification, progression of HEP. AQUATIC THERAPY     Plan for next treatment session: start with emphasis on mat table ex, manual, heat and progress to more fxnl/CKC activities as alix    PLAN: See eval. PT 2x / week for 6  weeks. [x] Continue per plan of care [] Alter current plan (see comments)  [] Plan of care initiated [] Hold pending MD visit [] Discharge    Electronically signed by: Scott Bella, PT, DPT  Therapist was present, directed the patient's care, made skilled judgement, and was responsible for assessment and treatment of the patient. Rudy Gaston, SPT      Note: If patient does not return for scheduled/ recommended follow up visits, this note will serve as a discharge from care along with most recent update on progress.

## 2021-09-14 ENCOUNTER — HOSPITAL ENCOUNTER (OUTPATIENT)
Dept: PHYSICAL THERAPY | Age: 56
Setting detail: THERAPIES SERIES
Discharge: HOME | End: 2021-09-14
Payer: COMMERCIAL

## 2021-09-14 PROCEDURE — 97530 THERAPEUTIC ACTIVITIES: CPT

## 2021-09-14 PROCEDURE — 97110 THERAPEUTIC EXERCISES: CPT

## 2021-09-14 NOTE — FLOWSHEET NOTE
[address] [facility] Physical Therapy  Phone: [phone]   Fax: [phone]    Physical Therapy Daily Treatment Note  Date:  2021    Patient Name:  Isaiah Brittle   (Delmi Painter) :  1965  MRN: [id number]  Medical/Treatment Diagnosis Information:  Diagnosis: Chronic bilateral low back pain without sciatica M54.5, G89.29  Treatment Diagnosis: poor habitual postures/body mechs, myofascial pain, strength and flexibility deficits contribute to pt's back and sciatica pain  Insurance/Certification information:  PT Insurance Information: [employer] 30 visits soft limit. no auth  Physician Information:  Referring Practitioner: BARRY Mccarty  Plan of care signed (Y/N): []  Yes [x]  No     Date of Patient follow up with Physician:      Progress Report: []  Yes  [x]  No      Date Range for reporting period:  Beginnin/19  Ending:     Progress report due (10 Rx/or 30 days whichever is less): visit #78 or 832    Recertification due (POC duration/ or 90 days whichever is less): visit # or     Visit # Insurance Allowable Auth required? Date Range    30 []  Yes  [x]  No NA         Latex Allergy:  [x]NO      []YES  Preferred Language for Healthcare:   [x]English       []other:    Functional Scale:        Date assessed:  oswestry: raw score = 31, dysfunction = 62%, taken at initial eval    Pain level: 10  B LBP, B knees       SUBJECTIVE: : Patient is feeling well and has decreased pain today. Patient recently purchased a massage roller and had her  roll out her back. Pt states that her  went a little too hard and she has been sore since Wednesday and is also sore today. HEP is  going well and pt notices less pain when she performs her exercises. Patient has been keeping track of how long she is standing in a journal (with the help of her children) and the patient reports that she unable to stand for more than 3-5 min.     : Patient has increased pain today going from low back into R gluteal region and R knee. Patient has increased the amount of time she is standing in the kitchen and suspects that could bethe potential cause of her increase in pain. Patient reports that she is performing her HEP as often as she can.   9/7 no knee pain today. Does have LBP today. Had L ankle pain on wkd but no ankle pain today. No L LE pain today. 9/2 back and buttock pain is better; now has L medial knee pain. Knee pain  gets better with walking/ nustep    OBJECTIVE: 9/14, 9/9 9/7, 9/2, 8/31, 8/26,  8/19 used Ipad interpretor for [place]  9/2 palpate; tender L medial tibial condyle, L medial HS.   8/31 difficulty with TrA iso  8/19 Pt's son present for PT eval and tx. See eval      RESTRICTIONS/PRECAUTIONS: hx of covid 1 yr ago     Exercises/Interventions: PREFERS MAT EX for HEP BC THEY HELP WITH PAIN.  Per pt, low alix of standing ex, especially  for HEP    Therapeutic Exercises (05331) Resistance / level Sets Reps Notes   Nu step     Not done due to time constraints    IB   HR/TR 3x30\"       HS Step stretch       TG B squat with Tr A iso 2x5x Next session as alix: try min squats to approx 40-45 deg and  orange tband looped at thighs to promote hip ER,   Bilat knee pain increased with this exercise-           Stabs  TrA iso   8/31 required mod-max cues for correct performance   Mat exercises  SKTC  Fig 4 pirformis stretch in sitting  Supine piriformis stretch   Supine HS stretch   Supine trunk rotation  3x 30\"  R3x30\" B2x bilat 20-30 sec  9/13: reviewed how to use towel to assist with stretch   8/26: reviewed exercises       9/13 VC and TC to let legs fall to one side   Pelvic bridge with TA       Prone press up 1x5      5x3-5 sec   Just lifting bottom off of the table           Therapeutic Activities (89346)       See PT education section below 15 min       Extensive pt ed on HEP - proper form and  activity levels,    9/14: patient took notes on printed exercises that were newly prescribed from today   TA  10x5\"      Sitting posture- instructed in pelvic neutral positioning. 9/7 instructed patient to use towel roll in sitting at lordosis curve and pillow below feet to maintain appropriate body mechanics in the car and sitting in a chair at home. Patient required extensive TC and VC in order to understand exercise with multiple reps in order for her to be able to perform at home. Standing at counter     9/7 instructed patient to rest feet inside of counter to shift position of low back when standing for longer periods of time. Patient required extensive TC and VC in order to understand exercise with multiple reps in order for her to be able to perform at home. Neuromuscular Re-ed (34925)       Seated SB   Wt shifts x 6: a/p, cw/ccw  R/L    CGA and B UE support   Balance exercises  When pt able to tolerate                                   Manual Intervention (26958)       Roller to B lower quarter, manual stretching to hamstrings, rectus femoris, and piriformis. 10 min- instructed pt for HEP     9/14: Educated patient on how to instruct  to not roll with max pressure to prevent extensive soreness. SIJ L ASIS higher, leg pull on L  STM to L ITB    8/31 deferred due to time spent re-instructing on HEP    8/26 Big leg length difference    MET                                Modalities: next session - heat prn    Pt. Education:  9/14: Reviewed HEP and the importance of using the massage roller with gentle force. Encouraged patient to continue with HEP and continue to journal how long patient is able to tolerate standing. Also emphasized the importance of continuing to use lumbar support towel roll and weight shifting when standing. 9/9: reviewed HEP. Educated patient on the importance of  feet and using a wide base of support to reduce fear and risk of falling.  Patient was educated to record the amount of time she stands per day to estimate what her personal limits are with standing  while completing ADL's and IADL's. Patient has agreed to do this and have her children help her record the amount of time she is on her feet each day. 9/7: reviewed HEP, patient was able to take notes in [place] in addition to drawing pictures on her HEP printouts so she is able to easily perform them at home.   -8/31 review HEP - multiple cues for correct performance. Issued new handout with TrA iso and all ex. Re-explained all ex so that  pt could write notes in [place] in order  to remember how to do it correctly  8/19 patient and her son  Educated extensively on diagnosis, prognosis and expectations for rehab. Instructed pt on approp ex /activity intensity.   -all patient questions were answered    Home Exercise Program: 9/14: Patient continues to perform HEP and notes that she enjoys her newly prescribed exercises and notes that they are helping her to reduce her pain. Access Code: [email or web address]  URL: [email or web address]  Date: 09/14/2021  Prepared by: Maisha Chappell    Exercises  Prone Press Up - 2 x daily - 7 x weekly - 1-2 sets - 10 reps - 3-5 sec hold  Supine Lower Trunk Rotation - 1 x daily - 7 x weekly - 1 sets - 10 reps - 5 hold  Supine Bridge with Pelvic Floor Contraction - 1 x daily - 7 x weekly - 3 sets - 10 reps      9/9 Patient continues to perform HEP and will record the amount of time she stands per to in order to learn what her standing limit is per day. 9/7 pt instructed in pelvic neutral and putting one foot on inside of cabinet to reduce pain while standing in kitchen to cook and wash dishes. Upright sitting posture with lumbar roll and feet on folded pillow (used as stool)  Access Code: [email or web address]  URL: [email or web address]  Date: 08/31/2021  Prepared by: Maisha Chappell    Exercises  Supine Transversus Abdominis Bracing - Hands on Stomach - 2 x daily - 7 x weekly - 1 sets - 10 reps - 5 hold  Supine Piriformis Stretch with Foot on Ground - 2 x daily - 7 x weekly - 1 sets - 3 reps - 30 hold  Hooklying Single Knee to Chest Stretch - 2 x daily - 7 x weekly - 1 sets - 3 reps - 30 hold  Supine Hamstring Stretch with Strap - 1 x daily - 7 x weekly - 3 sets - 10 reps    Access Code: [email or web address]   URL: [employer]/  Date: 08/19/2021  Prepared by: Bean Aas    Exercises  Supine Posterior Pelvic Tilt - 2 x daily - 7 x weekly - 1-2 sets - 10 reps - 5 hold  Supine Hamstring Stretch - 2 x daily - 7 x weekly - 1 sets - 3-5 reps - 20-30 hold  Hooklying Single Knee to Chest Stretch - 2 x daily - 7 x weekly - 1 sets - 3 reps - 30 hold  Supine Piriformis Stretch with Foot on Ground - 2 x daily - 7 x weekly - 1 sets - 3 reps - 30 hold      Therapeutic Exercise and NMR EXR  [x] (31196) Provided verbal/tactile cueing for activities related to strengthening, flexibility, endurance, ROM for improvements in  [x] LE / Lumbar: LE, proximal hip, and core control with self care, mobility, lifting, ambulation. [] UE / Cervical: cervical, postural, scapular, scapulothoracic and UE control with self care, reaching, carrying, lifting, house/yardwork, driving, computer work.  [] (67897) Provided verbal/tactile cueing for activities related to improving balance, coordination, kinesthetic sense, posture, motor skill, proprioception to assist with   [] LE / lumbar: LE, proximal hip, and core control in self care, mobility, lifting, ambulation and eccentric single leg control.    [] UE / cervical: cervical, scapular, scapulothoracic and UE control with self care, reaching, carrying, lifting, house/yardwork, driving, computer work.   [] (95837) Therapist is in constant attendance of 2 or more patients providing skilled therapy interventions, but not providing any significant amount of measurable one-on-one time to either patient, for improvements in  [] LE / lumbar: LE, proximal hip, and core control in self care, mobility, lifting, ambulation and eccentric single leg control. [] UE / cervical: cervical, scapular, scapulothoracic and UE control with self care, reaching, carrying, lifting, house/yardwork, driving, computer work. NMR and Therapeutic Activities:    [x] (81045 or 72070) Provided verbal/tactile cueing for activities related to improving balance, coordination, kinesthetic sense, posture, motor skill, proprioception and motor activation to allow for proper function of   [x] LE: / Lumbar core, proximal hip and LE with self care and ADLs  [] UE / Cervical: cervical, postural, scapular, scapulothoracic and UE control with self care, carrying, lifting, driving, computer work.   [] (14098) Gait Re-education- Provided training and instruction to the patient for proper LE, core and proximal hip recruitment and positioning and eccentric body weight control with ambulation re-education including up and down stairs     Home Management Training / Self Care:  [] (00359) Provided self-care/home management training related to activities of daily living and compensatory training, and/or use of adaptive equipment for improvement with: ADLs and compensatory training, meal preparation, safety procedures and instruction in use of adaptive equipment, including bathing, grooming, dressing, personal hygiene, basic household cleaning and chores.      Home Exercise Program:    [x] (81003) Reviewed/Progressed HEP activities related to strengthening, flexibility, endurance, ROM of   [] LE / Lumbar: core, proximal hip and LE for functional self-care, mobility, lifting and ambulation/stair navigation   [] UE / Cervical: cervical, postural, scapular, scapulothoracic and UE control with self care, reaching, carrying, lifting, house/yardwork, driving, computer work  [] (46553)Reviewed/Progressed HEP activities related to improving balance, coordination, kinesthetic sense, posture, motor skill, proprioception of   [] LE: core, proximal hip and LE for self care, mobility, Met: []? Not Met: []? Adjusted  2. Patient will have a decrease in pain to facilitate improvement in movement, function, and ADLs as indicated by improvement with respect to Functional Deficits. []? Progressing: []? Met: []? Not Met: []? Adjusted     Long Term Goals: To be achieved in:6 weeks  1. Disability index score of 20 % or less on the   to assist with reaching prior level of function. []? Progressing: []? Met: []? Not Met: []? Adjusted  2. Patient will demonstrate increased AROM  to [facility], to allow for proper joint functioning to allow pt to resume ADLS/IADLS, home chores, lifting with good posture and body mechs all  without increase in symptoms. []? Progressing: []? Met: []? Not Met: []? Adjusted  3. Patient will demonstrate increased Strength by 1/2 mmt grade  and core activation to allow for proper functional mobility as indicated by patients Functional Deficits to allow pt to resume walking >/=  30-60 min and prn for shopping, going out in Community without increase in symptoms. []? Progressing: []? Met: []? Not Met: []? Adjusted  4. 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 []? Progressing: []? Met: []? Not Met: []? Adjusted     Overall Progression Towards Functional goals/ Treatment Progress Update:  [x] Patient is progressing as expected towards functional goals listed. [] Progression is slowed due to complexities/Impairments listed. [] Progression has been slowed due to co-morbidities.   [] Plan just implemented, too soon to assess goals progression <30days   [] Goals require adjustment due to lack of progress  [] Patient is not progressing as expected and requires additional follow up with physician  [] Other    Persisting Functional Limitations/Impairments:  [x]Sleeping [x]Sitting               [x]Standing [x]Transfers        [x]Walking []Kneeling               [x]Stairs [x]Squatting / bending   [x]ADLs [x]Reaching  [x]Lifting  [x]Housework  []Driving []Job related tasks  []Sports/Recreation []Other:        ASSESSMENT:   9/14: Patient able to tolerate gym and mat exercises today due to decreased pain. Patient continues to make core stabilization gains. With the help of her  and children, patient is able to journal how long she stands per day in addition to how easy/hard it is for her to perform her ADL's and IADL's. Patient recently purchased a massage roller off of [address] and her  has been using the roller on her low back. Patient's  used too much force with roller and patient reports that she was in excessive pain from last Wednesday through today. Appropriate rolling technique was demonstrated and patient has an increased understanding of appropriate force and use of massage roller. 9/9: Patient unable to tolerate full gym program today due to increase in pain. Patient continues to make core stabilization gains and utilize appropriate body mechanics to minimize fear and risk of falling while also striving for increased standing time at home in order to complete ADL's and IADL's. Patient expressed concern over numbness and tingling in R UE. Patient will be visiting her PCP and wants to discuss the option of receiving occupational therapy to help with her R hand and UE deficits/pain. 9/7: Improved ex tolerance and decreased pain in legs, pain has centralized to low back. Patient continues to make core-strength stabilization gains and desired to work towards standing and sitting for longer periods of time. 9/2 improved ex alix and decreased pain. STM to myofascial pain after tx helps with pain management  8/31 Pt cont to have difficulty doing HEP correctly.   Spent extra time re-instructing in how to do HEP correctly - had pt write notes in [place] on HEP handout    Treatment/Activity Tolerance:  [x] Patient able to complete tx [] Patient limited by fatigue  [] Patient limited by pain  [] Patient limited by other medical complications  [] Other:     Prognosis: [x] Good [] Fair  [] Poor    Patient Requires Follow-up: [x] Yes  [] No    PLAN:  strength, ROM/flexibility, posture and body mechs, manual, MOC, HEP, pt education     Frequency/Duration:   2 days per week for 6 Weeks:  Interventions:  [x]? Therapeutic exercise including:strength, ROM, flexibility  [x]? NMR activation and proprioception including postural re-education  [x]? Manual therapy as indicated to include: IASTM, STM, PROM, Gr I-IV mobilizations, manipulation. [x]? Modalities as needed that may include: thermal agents, E-stim, Biofeedback, US, iontophoresis as indicated  [x]? Patient education on joint protection, postural re-education, activity modification, progression of HEP. AQUATIC THERAPY     Plan for next treatment session: start with emphasis on mat table ex, manual, heat and progress to more fxnl/CKC activities as alix    PLAN: See eval. PT 2x / week for 6  weeks. [x] Continue per plan of care [] Alter current plan (see comments)  [] Plan of care initiated [] Hold pending MD visit [] Discharge    Electronically signed by: Alfreda Rodgers, PT, DPT  Therapist was present, directed the patient's care, made skilled judgement, and was responsible for assessment and treatment of the patient. Rudy Gaston, SPT      Note: If patient does not return for scheduled/ recommended follow up visits, this note will serve as a discharge from care along with most recent update on progress.

## 2021-09-16 ENCOUNTER — HOSPITAL ENCOUNTER (OUTPATIENT)
Dept: PHYSICAL THERAPY | Age: 56
Setting detail: THERAPIES SERIES
Discharge: HOME | End: 2021-09-16
Payer: COMMERCIAL

## 2021-09-16 PROCEDURE — 97110 THERAPEUTIC EXERCISES: CPT

## 2021-09-16 PROCEDURE — 97530 THERAPEUTIC ACTIVITIES: CPT

## 2021-09-16 NOTE — FLOWSHEET NOTE
[address] [facility] Physical Therapy  Phone: [phone]   Fax: [phone]    Physical Therapy Daily Treatment Note  Date:  2021    Patient Name:  Andrea Marion) :  1965  MRN: [id number]  Medical/Treatment Diagnosis Information:  Diagnosis: Chronic bilateral low back pain without sciatica M54.5, G89.29  Treatment Diagnosis: poor habitual postures/body mechs, myofascial pain, strength and flexibility deficits contribute to pt's back and sciatica pain  Insurance/Certification information:  PT Insurance Information: [place] 30 visits soft limit. no auth  Physician Information:  Referring Practitioner: BARRY Quiles  Plan of care signed (Y/N): []  Yes [x]  No     Date of Patient follow up with Physician:      Progress Report: []  Yes  [x]  No      Date Range for reporting period:  Beginnin/19  Ending:     Progress report due (10 Rx/or 30 days whichever is less): visit #72 or 289    Recertification due (POC duration/ or 90 days whichever is less): visit # or     Visit # Insurance Allowable Auth required? Date Range    30 []  Yes  [x]  No NA         Latex Allergy:  [x]NO      []YES  Preferred Language for Healthcare:   [x]English       []other:    Functional Scale:        Date assessed:  oswestry: raw score = 31, dysfunction = 62%, taken at initial eval    Pain level: 3-4/10  B LBP, B knees       SUBJECTIVE: : Patient reports that she is feeling better and less sore. She is able to stand for 8-10 min, but standing in her kitchen is harder. HEP is going well and she has pain relief with all of her exercises. : Patient is feeling well and has decreased pain today. Patient recently purchased a massage roller and had her  roll out her back. Pt states that her  went a little too hard and she has been sore since Wednesday and is also sore today.  HEP is  going well and pt notices less pain when she performs her exercises. Patient has been keeping track of how long she is standing in a journal (with the help of her children) and the patient reports that she unable to stand for more than 3-5 min. 9/9: Patient has increased pain today going from low back into R gluteal region and R knee. Patient has increased the amount of time she is standing in the kitchen and suspects that could bethe potential cause of her increase in pain. Patient reports that she is performing her HEP as often as she can.   9/7 no knee pain today. Does have LBP today. Had L ankle pain on wkd but no ankle pain today. No L LE pain today. 9/2 back and buttock pain is better; now has L medial knee pain. Knee pain  gets better with walking/ nustep    OBJECTIVE: 9/16, 9/14, 9/9 9/7, 9/2, 8/31, 8/26,  8/19 used Ipad interpretor for [place]  9/2 palpate; tender L medial tibial condyle, L medial HS.   8/31 difficulty with TrA iso  8/19 Pt's son present for PT eval and tx. See eval      RESTRICTIONS/PRECAUTIONS: hx of covid 1 yr ago     Exercises/Interventions: PREFERS MAT EX for HEP BC THEY HELP WITH PAIN.  Per pt, low alix of standing ex, especially  for HEP    Therapeutic Exercises (26597) Resistance / level Sets Reps Notes   Nu step  L1, min s=12, arms = 5       IB   HR/TR 3x30\"       HS Step stretch 2x30\" B       TG B squat with Tr A iso and organge band around knees 2x10  squats to approx 40-45 deg            Stabs  TrA iso   8/31 required mod-max cues for correct performance   Mat exercises  SKTC  Fig 4 pirformis stretch in sitting  Supine piriformis stretch   Supine HS stretch   Supine trunk rotation  2x bilat 20-30 sec  9/13: reviewed how to use towel to assist with stretch   8/26: reviewed exercises       9/13 VC and TC to let legs fall to one side   Pelvic bridge with TA       Prone press up 1x5      5x3-5 sec             Therapeutic Activities (07837)       See PT education section below 15 min       Extensive pt ed on HEP and activity levels / working in [employer] - proper form     9/14: patient took notes on printed exercises that were newly prescribed from today   TA  10x5\"      Sitting posture- instructed in pelvic neutral positioning. 9/7 instructed patient to use towel roll in sitting at lordosis curve and pillow below feet to maintain appropriate body mechanics in the car and sitting in a chair at home. Patient required extensive TC and VC in order to understand exercise with multiple reps in order for her to be able to perform at home. Standing at counter     9/7 instructed patient to rest feet inside of counter to shift position of low back when standing for longer periods of time. Patient required extensive TC and VC in order to understand exercise with multiple reps in order for her to be able to perform at home. Neuromuscular Re-ed (60358)       Seated SB   Wt shifts x 6: a/p, cw/ccw  R/L    CGA and B UE support   Balance exercises  When pt able to tolerate                                   Manual Intervention (74523)       Roller to B lower quarter, manual stretching to hamstrings, rectus femoris, and piriformis. 9/14: Educated patient on how to instruct  to not roll with max pressure to prevent extensive soreness. SIJ L ASIS higher, leg pull on L  STM to L ITB    8/31 deferred due to time spent re-instructing on HEP    8/26 Big leg length difference    MET                                Modalities: next session - heat prn    Pt. Education:  9/16: Encouraged pt to continue recording how long she is standing per day. Encouraged patient to purchase a rubber mat to stand on (from [employer]/store of her choice) to help her increase her standing time in the kitchen. Also encouraged pt to wear house shoes to provide more support and she stands in her kitchen. 9/14: Reviewed HEP and the importance of using the massage roller with gentle force.  Encouraged patient to continue with HEP and continue to journal how long patient is able to tolerate standing. Also emphasized the importance of continuing to use lumbar support towel roll and weight shifting when standing. 9/9: reviewed HEP. Educated patient on the importance of  feet and using a wide base of support to reduce fear and risk of falling. Patient was educated to record the amount of time she stands per day to estimate what her personal limits are with standing  while completing ADL's and IADL's. Patient has agreed to do this and have her children help her record the amount of time she is on her feet each day. 9/7: reviewed HEP, patient was able to take notes in [place] in addition to drawing pictures on her HEP printouts so she is able to easily perform them at home.   -8/31 review HEP - multiple cues for correct performance. Issued new handout with TrA iso and all ex. Re-explained all ex so that  pt could write notes in [place] in order  to remember how to do it correctly  8/19 patient and her son  Educated extensively on diagnosis, prognosis and expectations for rehab. Instructed pt on approp ex /activity intensity.   -all patient questions were answered    Home Exercise Program: 9/16 anti-fatigue mat for kitchen; supportive house shoes   9/14: Patient continues to perform HEP and notes that she enjoys her newly prescribed exercises and notes that they are helping her to reduce her pain. Access Code: [email or web address]  URL: [email or web address]  Date: 09/14/2021  Prepared by: Charleen Pinedo    Exercises  Prone Press Up - 2 x daily - 7 x weekly - 1-2 sets - 10 reps - 3-5 sec hold  Supine Lower Trunk Rotation - 1 x daily - 7 x weekly - 1 sets - 10 reps - 5 hold  Supine Bridge with Pelvic Floor Contraction - 1 x daily - 7 x weekly - 3 sets - 10 reps      9/9 Patient continues to perform HEP and will record the amount of time she stands per to in order to learn what her standing limit is per day.    9/7 pt instructed in pelvic neutral and putting one foot on inside of cabinet to reduce pain while standing in kitchen to cook and wash dishes. Upright sitting posture with lumbar roll and feet on folded pillow (used as stool)  Access Code: [email or web address]  URL: [email or web address]  Date: 08/31/2021  Prepared by: Jim Danas    Exercises  Supine Transversus Abdominis Bracing - Hands on Stomach - 2 x daily - 7 x weekly - 1 sets - 10 reps - 5 hold  Supine Piriformis Stretch with Foot on Ground - 2 x daily - 7 x weekly - 1 sets - 3 reps - 30 hold  Hooklying Single Knee to Chest Stretch - 2 x daily - 7 x weekly - 1 sets - 3 reps - 30 hold  Supine Hamstring Stretch with Strap - 1 x daily - 7 x weekly - 3 sets - 10 reps    Access Code: [email or web address]   URL: [email or web address]  Date: 08/19/2021  Prepared by: Jim Danas    Exercises  Supine Posterior Pelvic Tilt - 2 x daily - 7 x weekly - 1-2 sets - 10 reps - 5 hold  Supine Hamstring Stretch - 2 x daily - 7 x weekly - 1 sets - 3-5 reps - 20-30 hold  Hooklying Single Knee to Chest Stretch - 2 x daily - 7 x weekly - 1 sets - 3 reps - 30 hold  Supine Piriformis Stretch with Foot on Ground - 2 x daily - 7 x weekly - 1 sets - 3 reps - 30 hold      Therapeutic Exercise and NMR EXR  [x] (13754) Provided verbal/tactile cueing for activities related to strengthening, flexibility, endurance, ROM for improvements in  [x] LE / Lumbar: LE, proximal hip, and core control with self care, mobility, lifting, ambulation. [] UE / Cervical: cervical, postural, scapular, scapulothoracic and UE control with self care, reaching, carrying, lifting, house/yardwork, driving, computer work.  [] (54015) Provided verbal/tactile cueing for activities related to improving balance, coordination, kinesthetic sense, posture, motor skill, proprioception to assist with   [] LE / lumbar: LE, proximal hip, and core control in self care, mobility, lifting, ambulation and eccentric single leg control.    [] UE / cervical: cervical, scapular, scapulothoracic and UE control with self care, reaching, carrying, lifting, house/yardwork, driving, computer work.   [] (06035) Therapist is in constant attendance of 2 or more patients providing skilled therapy interventions, but not providing any significant amount of measurable one-on-one time to either patient, for improvements in  [] LE / lumbar: LE, proximal hip, and core control in self care, mobility, lifting, ambulation and eccentric single leg control. [] UE / cervical: cervical, scapular, scapulothoracic and UE control with self care, reaching, carrying, lifting, house/yardwork, driving, computer work. NMR and Therapeutic Activities:    [x] (88320 or 13641) Provided verbal/tactile cueing for activities related to improving balance, coordination, kinesthetic sense, posture, motor skill, proprioception and motor activation to allow for proper function of   [x] LE: / Lumbar core, proximal hip and LE with self care and ADLs  [] UE / Cervical: cervical, postural, scapular, scapulothoracic and UE control with self care, carrying, lifting, driving, computer work.   [] (94750) Gait Re-education- Provided training and instruction to the patient for proper LE, core and proximal hip recruitment and positioning and eccentric body weight control with ambulation re-education including up and down stairs     Home Management Training / Self Care:  [] (48279) Provided self-care/home management training related to activities of daily living and compensatory training, and/or use of adaptive equipment for improvement with: ADLs and compensatory training, meal preparation, safety procedures and instruction in use of adaptive equipment, including bathing, grooming, dressing, personal hygiene, basic household cleaning and chores.      Home Exercise Program:    [x] (78680) Reviewed/Progressed HEP activities related to strengthening, flexibility, endurance, ROM of   [] LE / Lumbar: core, proximal hip and LE for functional self-care, mobility, lifting and ambulation/stair navigation   [] UE / Cervical: cervical, postural, scapular, scapulothoracic and UE control with self care, reaching, carrying, lifting, house/yardwork, driving, computer work  [] (61972)Reviewed/Progressed HEP activities related to improving balance, coordination, kinesthetic sense, posture, motor skill, proprioception of   [] LE: core, proximal hip and LE for self care, mobility, lifting, and ambulation/stair navigation    [] UE / Cervical: cervical, postural,  scapular, scapulothoracic and UE control with self care, reaching, carrying, lifting, house/yardwork, driving, computer work    Manual Treatments:  PROM / STM / Oscillations-Mobs:  G-I, II, III, IV (PA's, Inf., Post.)  [x] (61448) Provided manual therapy to mobilize LE, proximal hip and/or LS spine soft tissue/joints for the purpose of modulating pain, promoting relaxation,  increasing ROM, reducing/eliminating soft tissue swelling/inflammation/restriction, improving soft tissue extensibility and allowing for proper ROM for normal function with   [x] LE / lumbar: self care, mobility, lifting and ambulation. [] UE / Cervical: self care, reaching, carrying, lifting, house/yardwork, driving, computer work. Modalities:  [] (20552) Vasopneumatic compression: Utilized vasopneumatic compression to decrease edema / swelling for the purpose of improving mobility and quad tone / recruitment which will allow for increased overall function including but not limited to self-care, transfers, ambulation, and ascending / descending stairs.        Charges:  Timed Code Treatment Minutes: 45   Total Treatment Minutes: 45     [] EVAL - LOW (23121)   [] EVAL - MOD (08277)  [] EVAL - HIGH (83012)  [] RE-EVAL (60934)  [x] FS(63081) x  2    [] Ionto  [] NMR (62002) x 1     [] Vaso  [] Manual (60142) x    1   [] Ultrasound  [x] TA x   1    [] Mech Traction (86069)  [] Aquatic Therapy x     [] ES (un) (49198):   [] Home Management Training x 2  [] ES(attended) (66671)   [] Dry Needling 1-2 muscles (88408):  [] Dry Needling 3+ muscles (883037)  [] Group:      [] Other:     GOALS:  Patient stated goal: less pain  []? Progressing: []? Met: []? Not Met: []? Adjusted     Therapist goals for Patient:   Short Term Goals: To be achieved in: 2 weeks  1. Independent in HEP and progression per patient tolerance, in order to prevent re-injury. []? Progressing: []? Met: []? Not Met: []? Adjusted  2. Patient will have a decrease in pain to facilitate improvement in movement, function, and ADLs as indicated by improvement with respect to Functional Deficits. []? Progressing: []? Met: []? Not Met: []? Adjusted     Long Term Goals: To be achieved in:6 weeks  1. Disability index score of 20 % or less on the   to assist with reaching prior level of function. []? Progressing: []? Met: []? Not Met: []? Adjusted  2. Patient will demonstrate increased AROM  to [facility], to allow for proper joint functioning to allow pt to resume ADLS/IADLS, home chores, lifting with good posture and body mechs all  without increase in symptoms. []? Progressing: []? Met: []? Not Met: []? Adjusted  3. Patient will demonstrate increased Strength by 1/2 mmt grade  and core activation to allow for proper functional mobility as indicated by patients Functional Deficits to allow pt to resume walking >/=  30-60 min and prn for shopping, going out in Community without increase in symptoms. []? Progressing: []? Met: []? Not Met: []? Adjusted  4. 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 []? Progressing: []? Met: []? Not Met: []? Adjusted     Overall Progression Towards Functional goals/ Treatment Progress Update:  [x] Patient is progressing as expected towards functional goals listed. [] Progression is slowed due to complexities/Impairments listed.   [] Progression has been slowed due to co-morbidities. [] Plan just implemented, too soon to assess goals progression <30days   [] Goals require adjustment due to lack of progress  [] Patient is not progressing as expected and requires additional follow up with physician  [] Other    Persisting Functional Limitations/Impairments:  [x]Sleeping [x]Sitting               [x]Standing [x]Transfers        [x]Walking []Kneeling               [x]Stairs [x]Squatting / bending   [x]ADLs [x]Reaching  [x]Lifting  [x]Housework  []Driving []Job related tasks  []Sports/Recreation []Other:        ASSESSMENT:   9/16 Patient able to tolerate exercise well with minimal increase in pain. Patient is very compliant with HEP and notes that it is helping her with her pain control. Patient continues to make gains with core control, but requires VC and TC to maintain during some exercises. Patient will continue to record how long she stands per day, as well as purchase a rubber support mat for her kitchen so she is able to stand for longer periods of time in her kitchen. 9/14: Patient able to tolerate gym and mat exercises today due to decreased pain. Patient continues to make core stabilization gains. With the help of her  and children, patient is able to journal how long she stands per day in addition to how easy/hard it is for her to perform her ADL's and IADL's. Patient recently purchased a massage roller off of [employer] and her  has been using the roller on her low back. Patient's  used too much force with roller and patient reports that she was in excessive pain from last Wednesday through today. Appropriate rolling technique was demonstrated and patient has an increased understanding of appropriate force and use of massage roller. 9/9: Patient unable to tolerate full gym program today due to increase in pain.  Patient continues to make core stabilization gains and utilize appropriate body mechanics to minimize fear and risk of falling while also striving for increased standing time at home in order to complete ADL's and IADL's. Patient expressed concern over numbness and tingling in R UE. Patient will be visiting her PCP and wants to discuss the option of receiving occupational therapy to help with her R hand and UE deficits/pain. 9/7: Improved ex tolerance and decreased pain in legs, pain has centralized to low back. Patient continues to make core-strength stabilization gains and desired to work towards standing and sitting for longer periods of time. 9/2 improved ex alix and decreased pain. STM to myofascial pain after tx helps with pain management  8/31 Pt cont to have difficulty doing HEP correctly. Spent extra time re-instructing in how to do HEP correctly - had pt write notes in [place] on HEP handout    Treatment/Activity Tolerance:  [x] Patient able to complete tx [] Patient limited by fatigue  [] Patient limited by pain  [] Patient limited by other medical complications  [] Other:     Prognosis: [x] Good [] Fair  [] Poor    Patient Requires Follow-up: [x] Yes  [] No    PLAN:  strength, ROM/flexibility, posture and body mechs, manual, MOC, HEP, pt education     Frequency/Duration:   2 days per week for 6 Weeks:  Interventions:  [x]? Therapeutic exercise including:strength, ROM, flexibility  [x]? NMR activation and proprioception including postural re-education  [x]? Manual therapy as indicated to include: IASTM, STM, PROM, Gr I-IV mobilizations, manipulation. [x]? Modalities as needed that may include: thermal agents, E-stim, Biofeedback, US, iontophoresis as indicated  [x]? Patient education on joint protection, postural re-education, activity modification, progression of HEP. AQUATIC THERAPY     Plan for next treatment session: start with emphasis on mat table ex, manual, heat and progress to more fxnl/CKC activities as alix    PLAN: See eval. PT 2x / week for 6  weeks.    [x] Continue per plan of care [] Alter current plan (see comments)  [] Plan of care initiated [] Hold pending MD visit [] Discharge    Electronically signed by: Tanika Mclean, PT, DPT  Therapist was present, directed the patient's care, made skilled judgement, and was responsible for assessment and treatment of the patient. Rudy Gaston, SPT      Note: If patient does not return for scheduled/ recommended follow up visits, this note will serve as a discharge from care along with most recent update on progress.

## 2021-09-21 ENCOUNTER — HOSPITAL ENCOUNTER (OUTPATIENT)
Dept: PHYSICAL THERAPY | Age: 56
Setting detail: THERAPIES SERIES
Discharge: HOME | End: 2021-09-21
Payer: COMMERCIAL

## 2021-09-21 PROCEDURE — 97110 THERAPEUTIC EXERCISES: CPT

## 2021-09-21 PROCEDURE — 97530 THERAPEUTIC ACTIVITIES: CPT

## 2021-09-21 NOTE — FLOWSHEET NOTE
[facility]  Phone: [phone]   Fax: [phone]    Physical Therapy Daily Treatment Note  Date:  2021    Patient Name:  Carmencita Carmichael   (Ashok ) :  1965  MRN: [id number]  Medical/Treatment Diagnosis Information:  Diagnosis: Chronic bilateral low back pain without sciatica M54.5, G89.29  Treatment Diagnosis: poor habitual postures/body mechs, myofascial pain, strength and flexibility deficits contribute to pt's back and sciatica pain  Insurance/Certification information:  PT Insurance Information: [employer] 30 visits soft limit. no auth  Physician Information:  Referring Practitioner: BARRY Warner  Plan of care signed (Y/N): []  Yes [x]  No     Date of Patient follow up with Physician:      Progress Report: []  Yes  [x]  No      Date Range for reporting period:  Beginnin/19  Ending:     Progress report due (10 Rx/or 30 days whichever is less): visit #71 or 354     Recertification due (POC duration/ or 90 days whichever is less): visit #12 or     Visit # Insurance Allowable Auth required? Date Range     *progress note at next visit* 30 []  Yes  [x]  No NA         Latex Allergy:  [x]NO      []YES  Preferred Language for Healthcare:   [x]English       []other:    Functional Scale:        Date assessed:  oswestry: raw score = 31, dysfunction = 62%, taken at initial eval    Pain level: 3-4/10  B LBP, B knees       SUBJECTIVE: : Patient is feeling better today. Has some slight pain in her L knee. Patient states that she is having trouble with weight shifting as she is standing at her kitchen counter. Patient reports that she is able to  her kitchen for 8-15 min. The addition of a more \"plush\" kitchen mat has helped her stand for longer periods of time. : Patient reports that she is feeling better and less sore. She is able to stand for 8-10 min, but standing in her kitchen is harder.  HEP is going well and she has pain relief with all of her exercises. 9/14: Patient is feeling well and has decreased pain today. Patient recently purchased a massage roller and had her  roll out her back. Pt states that her  went a little too hard and she has been sore since Wednesday and is also sore today. HEP is  going well and pt notices less pain when she performs her exercises. Patient has been keeping track of how long she is standing in a journal (with the help of her children) and the patient reports that she unable to stand for more than 3-5 min. 9/9: Patient has increased pain today going from low back into R gluteal region and R knee. Patient has increased the amount of time she is standing in the kitchen and suspects that could bethe potential cause of her increase in pain. Patient reports that she is performing her HEP as often as she can.   9/7 no knee pain today. Does have LBP today. Had L ankle pain on wkd but no ankle pain today. No L LE pain today. 9/2 back and buttock pain is better; now has L medial knee pain. Knee pain  gets better with walking/ nustep    OBJECTIVE: 9/21, 9/16, 9/14, 9/9 9/7, 9/2, 8/31, 8/26,  8/19 used Ipad interpretor for Thai  9/2 palpate; tender L medial tibial condyle, L medial HS.   8/31 difficulty with TrA iso  8/19 Pt's son present for PT eval and tx. See eval      RESTRICTIONS/PRECAUTIONS: hx of covid 1 yr ago     Exercises/Interventions: PREFERS MAT EX for HEP BC THEY HELP WITH PAIN.  Per pt, low alix of standing ex, especially  for HEP    Therapeutic Exercises (45067) Resistance / level Sets Reps Notes   Nu step  L1, min s=12, arms = 5       IB   HR/TR 3x30\"       HS Step stretch 2x30\" B       TG B squat with Tr A iso and organge band around knees   squats to approx 40-45 deg  9/21 Not performed due to increased pain in knee          Stabs  TrA iso  Mat exercises  SKTC  Fig 4 pirformis stretch in sitting  Supine piriformis stretch   Supine HS stretch Supine trunk rotation  2x bilat 20-30 sec     Pelvic bridge with TA       Prone press up 1x10            SLR 2x30\" bilat      Therapeutic Activities (04713)       See PT education section below 15 min       Extensive pt ed on HEP and activity levels / working in [employer] - proper form   9/21 patient to purchase exercise ball to help with low back pain, may kneed to blow up ball at next session for patient. TA  10x5\"      Sitting posture- instructed in pelvic neutral positioning. Standing at counter                 Neuromuscular Re-ed (05060)       Seated SB   Wt shifts x 6: a/p, cw/ccw  R/L 15x ea except on ly 5x on R/L due to increased pain with R/L   CGA to get on/off   SBA while on SB with B UE support    9/21: patient to purchase SB, may need to blow up for pt at next visit. Balance exercises  When pt able to tolerate                                   Manual Intervention (23324)       Roller to B lower quarter, manual stretching to hamstrings, rectus femoris, and piriformis. 9/14: Educated patient on how to instruct  to not roll with max pressure to prevent extensive soreness. SIJ L ASIS higher, leg pull on L  STM to L ITB    8/31 deferred due to time spent re-instructing on HEP    8/26 Big leg length difference    MET                                Modalities: next session - heat prn    Pt. Education:  9/21:Patient expressed desire to purchase SB, educated patient on retailers and types of balls that she could purchase. Patient will bring in ball in future session to be blown up with air pump. Pt educated re she will need UE support in order to do SB ex at home    9/16: Encouraged pt to continue recording how long she is standing per day. Encouraged patient to purchase a rubber mat to stand on (from [employer]/store of her choice) to help her increase her standing time in the kitchen. Also encouraged pt to wear house shoes to provide more support and she stands in her kitchen.    9/14: Reviewed HEP and the importance of using the massage roller with gentle force. Encouraged patient to continue with HEP and continue to journal how long patient is able to tolerate standing. Also emphasized the importance of continuing to use lumbar support towel roll and weight shifting when standing. 9/9: reviewed HEP. Educated patient on the importance of  feet and using a wide base of support to reduce fear and risk of falling. Patient was educated to record the amount of time she stands per day to estimate what her personal limits are with standing  while completing ADL's and IADL's. Patient has agreed to do this and have her children help her record the amount of time she is on her feet each day. 9/7: reviewed HEP, patient was able to take notes in [place] in addition to drawing pictures on her HEP printouts so she is able to easily perform them at home.   -8/31 review HEP - multiple cues for correct performance. Issued new handout with TrA iso and all ex. Re-explained all ex so that  pt could write notes in [place] in order  to remember how to do it correctly  8/19 patient and her son  Educated extensively on diagnosis, prognosis and expectations for rehab. Instructed pt on approp ex /activity intensity.   -all patient questions were answered    Home Exercise Program:   9/21  Access Code: [email or web address]  URL: [email or web address]  Date: 09/21/2021  Prepared by: Cato Bamberger    Exercises  Supine Straight Leg Raises - 1 x daily - 7 x weekly - 2 sets - 4 reps - 20 hold    9/16 anti-fatigue mat for kitchen; supportive house shoes   9/14: Patient continues to perform HEP and notes that she enjoys her newly prescribed exercises and notes that they are helping her to reduce her pain. Access Code: [email or web address]  URL: [email or web address]  Date: 09/14/2021  Prepared by: Cato Bamberger    Exercises  Prone Press Up - 2 x daily - 7 x weekly - 1-2 sets - 10 reps - 3-5 sec hold  Supine Lower Trunk Rotation - 1 x daily - 7 x weekly - 1 sets - 10 reps - 5 hold  Supine Bridge with Pelvic Floor Contraction - 1 x daily - 7 x weekly - 3 sets - 10 reps      9/9 Patient continues to perform HEP and will record the amount of time she stands per to in order to learn what her standing limit is per day. 9/7 pt instructed in pelvic neutral and putting one foot on inside of cabinet to reduce pain while standing in kitchen to cook and wash dishes. Upright sitting posture with lumbar roll and feet on folded pillow (used as stool)  Access Code: [email or web address]  URL: [email or web address]  Date: 08/31/2021  Prepared by: Mic Franco    Exercises  Supine Transversus Abdominis Bracing - Hands on Stomach - 2 x daily - 7 x weekly - 1 sets - 10 reps - 5 hold  Supine Piriformis Stretch with Foot on Ground - 2 x daily - 7 x weekly - 1 sets - 3 reps - 30 hold  Hooklying Single Knee to Chest Stretch - 2 x daily - 7 x weekly - 1 sets - 3 reps - 30 hold  Supine Hamstring Stretch with Strap - 1 x daily - 7 x weekly - 3 sets - 10 reps    Access Code: [email or web address]   URL: [email or web address]  Date: 08/19/2021  Prepared by: Mic Franco    Exercises  Supine Posterior Pelvic Tilt - 2 x daily - 7 x weekly - 1-2 sets - 10 reps - 5 hold  Supine Hamstring Stretch - 2 x daily - 7 x weekly - 1 sets - 3-5 reps - 20-30 hold  Hooklying Single Knee to Chest Stretch - 2 x daily - 7 x weekly - 1 sets - 3 reps - 30 hold  Supine Piriformis Stretch with Foot on Ground - 2 x daily - 7 x weekly - 1 sets - 3 reps - 30 hold      Therapeutic Exercise and NMR EXR  [x] (35356) Provided verbal/tactile cueing for activities related to strengthening, flexibility, endurance, ROM for improvements in  [x] LE / Lumbar: LE, proximal hip, and core control with self care, mobility, lifting, ambulation.   [] UE / Cervical: cervical, postural, scapular, scapulothoracic and UE control with self care, reaching, carrying, lifting, house/yardwork, driving, computer work.  [] (09108) Provided verbal/tactile cueing for activities related to improving balance, coordination, kinesthetic sense, posture, motor skill, proprioception to assist with   [] LE / lumbar: LE, proximal hip, and core control in self care, mobility, lifting, ambulation and eccentric single leg control. [] UE / cervical: cervical, scapular, scapulothoracic and UE control with self care, reaching, carrying, lifting, house/yardwork, driving, computer work.   [] (01080) Therapist is in constant attendance of 2 or more patients providing skilled therapy interventions, but not providing any significant amount of measurable one-on-one time to either patient, for improvements in  [] LE / lumbar: LE, proximal hip, and core control in self care, mobility, lifting, ambulation and eccentric single leg control. [] UE / cervical: cervical, scapular, scapulothoracic and UE control with self care, reaching, carrying, lifting, house/yardwork, driving, computer work.      NMR and Therapeutic Activities:    [x] (96782 or 38982) Provided verbal/tactile cueing for activities related to improving balance, coordination, kinesthetic sense, posture, motor skill, proprioception and motor activation to allow for proper function of   [x] LE: / Lumbar core, proximal hip and LE with self care and ADLs  [] UE / Cervical: cervical, postural, scapular, scapulothoracic and UE control with self care, carrying, lifting, driving, computer work.   [] (83790) Gait Re-education- Provided training and instruction to the patient for proper LE, core and proximal hip recruitment and positioning and eccentric body weight control with ambulation re-education including up and down stairs     Home Management Training / Self Care:  [] (66310) Provided self-care/home management training related to activities of daily living and compensatory training, and/or use of adaptive equipment for improvement with: ADLs and compensatory training, meal preparation, safety procedures and instruction in use of adaptive equipment, including bathing, grooming, dressing, personal hygiene, basic household cleaning and chores. Home Exercise Program:    [x] (57189) Reviewed/Progressed HEP activities related to strengthening, flexibility, endurance, ROM of   [] LE / Lumbar: core, proximal hip and LE for functional self-care, mobility, lifting and ambulation/stair navigation   [] UE / Cervical: cervical, postural, scapular, scapulothoracic and UE control with self care, reaching, carrying, lifting, house/yardwork, driving, computer work  [] (13897)Reviewed/Progressed HEP activities related to improving balance, coordination, kinesthetic sense, posture, motor skill, proprioception of   [] LE: core, proximal hip and LE for self care, mobility, lifting, and ambulation/stair navigation    [] UE / Cervical: cervical, postural,  scapular, scapulothoracic and UE control with self care, reaching, carrying, lifting, house/yardwork, driving, computer work    Manual Treatments:  PROM / STM / Oscillations-Mobs:  G-I, II, III, IV (PA's, Inf., Post.)  [x] (16312) Provided manual therapy to mobilize LE, proximal hip and/or LS spine soft tissue/joints for the purpose of modulating pain, promoting relaxation,  increasing ROM, reducing/eliminating soft tissue swelling/inflammation/restriction, improving soft tissue extensibility and allowing for proper ROM for normal function with   [x] LE / lumbar: self care, mobility, lifting and ambulation. [] UE / Cervical: self care, reaching, carrying, lifting, house/yardwork, driving, computer work. Modalities:  [] (23683) Vasopneumatic compression: Utilized vasopneumatic compression to decrease edema / swelling for the purpose of improving mobility and quad tone / recruitment which will allow for increased overall function including but not limited to self-care, transfers, ambulation, and ascending / descending stairs.        Charges:  Timed Code Treatment Minutes: 43   Total Treatment Minutes: 43     [] EVAL - LOW (82062)   [] EVAL - MOD (73479)  [] EVAL - HIGH (10761)  [] RE-EVAL (00460)  [x] XC(53424) x  2    [] Ionto  [] NMR (50939) x 1     [] Vaso  [] Manual (43463) x    1   [] Ultrasound  [x] TA x   1    [] Mech Traction (26946)  [] Aquatic Therapy x     [] ES (un) (91875):   [] Home Management Training x 2  [] ES(attended) (63438)   [] Dry Needling 1-2 muscles (70723):  [] Dry Needling 3+ muscles (459198)  [] Group:      [] Other:     GOALS:  Patient stated goal: less pain  [x]? Progressing: []? Met: []? Not Met: []? Adjusted     Therapist goals for Patient:   Short Term Goals: To be achieved in: 2 weeks  1. Independent in HEP and progression per patient tolerance, in order to prevent re-injury. [x]? Progressing: []? Met: []? Not Met: []? Adjusted  2. Patient will have a decrease in pain to facilitate improvement in movement, function, and ADLs as indicated by improvement with respect to Functional Deficits. [x]? Progressing: []? Met: []? Not Met: []? Adjusted     Long Term Goals: To be achieved in:6 weeks  1. Disability index score of 20 % or less on the   to assist with reaching prior level of function. [x]? Progressing: []? Met: []? Not Met: []? Adjusted  2. Patient will demonstrate increased AROM  to [facility], to allow for proper joint functioning to allow pt to resume ADLS/IADLS, home chores, lifting with good posture and body mechs all  without increase in symptoms. [x]? Progressing: []? Met: []? Not Met: []? Adjusted  3. Patient will demonstrate increased Strength by 1/2 mmt grade  and core activation to allow for proper functional mobility as indicated by patients Functional Deficits to allow pt to resume walking >/=  30-60 min and prn for shopping, going out in Community without increase in symptoms. [x]? Progressing: []? Met: []? Not Met: []? Adjusted  4.  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 [x]? Progressing: []? Met: []? Not Met: []? Adjusted     Overall Progression Towards Functional goals/ Treatment Progress Update:  [x] Patient is progressing as expected towards functional goals listed. [] Progression is slowed due to complexities/Impairments listed. [] Progression has been slowed due to co-morbidities. [] Plan just implemented, too soon to assess goals progression <30days   [] Goals require adjustment due to lack of progress  [] Patient is not progressing as expected and requires additional follow up with physician  [] Other    Persisting Functional Limitations/Impairments:  [x]Sleeping [x]Sitting               [x]Standing [x]Transfers        [x]Walking []Kneeling               [x]Stairs [x]Squatting / bending   [x]ADLs [x]Reaching  [x]Lifting  [x]Housework  []Driving []Job related tasks  []Sports/Recreation []Other:        ASSESSMENT:  9/21: Patient is progressing well with gym routine and HEP. Patient very compliant with HEP and understands how it can help her reduce her pain and improve symptoms during ADLs and IADLs. Patient still requires VC and TC with some exercises. Patient continues to document how long she is standing per day with the help of her children and . Patient experiences a great amount of relief with SB exercises. Patient expressed desire to purchase SB, educated patient on retailers and types of balls that she could purchase. Patient will bring in ball in future session to be blown up with air pump. Progress note to be done at next session with balance assessment. 9/16 Patient able to tolerate exercise well with minimal increase in pain. Patient is very compliant with HEP and notes that it is helping her with her pain control. Patient continues to make gains with core control, but requires VC and TC to maintain during some exercises.  Patient will continue to record how long she stands per day, as well as purchase a rubber support mat for her kitchen so she is able to stand for longer periods of time in her kitchen. 9/14: Patient able to tolerate gym and mat exercises today due to decreased pain. Patient continues to make core stabilization gains. With the help of her  and children, patient is able to journal how long she stands per day in addition to how easy/hard it is for her to perform her ADL's and IADL's. Patient recently purchased a massage roller off of [employer] and her  has been using the roller on her low back. Patient's  used too much force with roller and patient reports that she was in excessive pain from last Wednesday through today. Appropriate rolling technique was demonstrated and patient has an increased understanding of appropriate force and use of massage roller. 9/9: Patient unable to tolerate full gym program today due to increase in pain. Patient continues to make core stabilization gains and utilize appropriate body mechanics to minimize fear and risk of falling while also striving for increased standing time at home in order to complete ADL's and IADL's. Patient expressed concern over numbness and tingling in R UE. Patient will be visiting her PCP and wants to discuss the option of receiving occupational therapy to help with her R hand and UE deficits/pain. 9/7: Improved ex tolerance and decreased pain in legs, pain has centralized to low back. Patient continues to make core-strength stabilization gains and desired to work towards standing and sitting for longer periods of time. 9/2 improved ex alix and decreased pain. STM to myofascial pain after tx helps with pain management  8/31 Pt cont to have difficulty doing HEP correctly.   Spent extra time re-instructing in how to do HEP correctly - had pt write notes in [place] on HEP handout    Treatment/Activity Tolerance:  [x] Patient able to complete tx [] Patient limited by fatigue  [] Patient limited by pain  [] Patient limited by other medical complications  [] Other:     Prognosis: [x] Good [] Fair  [] Poor    Patient Requires Follow-up: [x] Yes  [] No    PLAN:  strength, ROM/flexibility, posture and body mechs, manual, MOC, HEP, pt education; consider pt bringing family member for education on supporting her with HEP/appropriate activity levels     Frequency/Duration:   2 days per week for 6 Weeks:  Interventions:  [x]? Therapeutic exercise including:strength, ROM, flexibility  [x]? NMR activation and proprioception including postural re-education  [x]? Manual therapy as indicated to include: IASTM, STM, PROM, Gr I-IV mobilizations, manipulation. [x]? Modalities as needed that may include: thermal agents, E-stim, Biofeedback, US, iontophoresis as indicated  [x]? Patient education on joint protection, postural re-education, activity modification, progression of HEP. AQUATIC THERAPY     Plan for next treatment session: start with emphasis on mat table ex, manual, heat and progress to more fxnl/CKC activities as alix    PLAN: See eval. PT 2x / week for 6  weeks. [x] Continue per plan of care [] Alter current plan (see comments)  [] Plan of care initiated [] Hold pending MD visit [] Discharge    Electronically signed by: Dontae Montenegro PT, DPT  Therapist was present, directed the patient's care, made skilled judgement, and was responsible for assessment and treatment of the patient. Rudy Gaston, SPT      Note: If patient does not return for scheduled/ recommended follow up visits, this note will serve as a discharge from care along with most recent update on progress.

## 2021-09-23 ENCOUNTER — HOSPITAL ENCOUNTER (OUTPATIENT)
Dept: PHYSICAL THERAPY | Age: 56
Setting detail: THERAPIES SERIES
Discharge: HOME | End: 2021-09-23
Payer: COMMERCIAL

## 2021-09-23 PROCEDURE — 97530 THERAPEUTIC ACTIVITIES: CPT

## 2021-09-23 PROCEDURE — 97110 THERAPEUTIC EXERCISES: CPT

## 2021-09-23 NOTE — PLAN OF CARE
Information:  Diagnosis: Chronic bilateral low back pain without sciatica M54.5, G89.29  Treatment Diagnosis: poor habitual postures/body mechs, myofascial pain, strength and flexibility deficits contribute to pt's back and sciatica pain  Insurance/Certification information:  PT Insurance Information: [employer] 30 visits soft limit. no auth  Physician Information:  Referring Practitioner: BARRY Poe  Plan of care signed (Y/N): []  Yes [x]  No     Date of Patient follow up with Physician:      Progress Report: []  Yes  [x]  No      Date Range for reporting period:  Beginnin/19  Ending:     Progress report due (10 Rx/or 30 days whichever is less): visit #67 or 055     Recertification due (POC duration/ or 90 days whichever is less): visit #12 or     Visit # Insurance Allowable Auth required? Date Range   10 /12  *progress note at next visit* 30 []  Yes  [x]  No NA         Latex Allergy:  [x]NO      []YES  Preferred Language for Healthcare:   [x]English       []other:    Functional Scale:        Date assessed:  oswestry: raw score = 31, dysfunction = 62%, taken at initial eval    Pain level: 3-4/10  B LBP,      SUBJECTIVE:  still has trouble cooking due to pain so her family helps her. No knee pain today. Able to stand 15 - 18 min when cooking.     : Patient is feeling better today. Has some slight pain in her L knee. Patient states that she is having trouble with weight shifting as she is standing at her kitchen counter. Patient reports that she is able to  her kitchen for 8-15 min. The addition of a more \"plush\" kitchen mat has helped her stand for longer periods of time. : Patient reports that she is feeling better and less sore. She is able to stand for 8-10 min, but standing in her kitchen is harder. HEP is going well and she has pain relief with all of her exercises. : Patient is feeling well and has decreased pain today.  Patient recently purchased a massage roller and had her  roll out her back. Pt states that her  went a little too hard and she has been sore since Wednesday and is also sore today. HEP is  going well and pt notices less pain when she performs her exercises. Patient has been keeping track of how long she is standing in a journal (with the help of her children) and the patient reports that she unable to stand for more than 3-5 min. 9/9: Patient has increased pain today going from low back into R gluteal region and R knee. Patient has increased the amount of time she is standing in the kitchen and suspects that could bethe potential cause of her increase in pain. Patient reports that she is performing her HEP as often as she can.   9/7 no knee pain today. Does have LBP today. Had L ankle pain on wkd but no ankle pain today. No L LE pain today. 9/2 back and buttock pain is better; now has L medial knee pain. Knee pain  gets better with walking/ nustep    OBJECTIVE: 9/23, 9/21, 9/16, 9/14, 9/9 9/7, 9/2, 8/31, 8/26,  8/19 used Ipad interpretor for Thai  9/2 palpate; tender L medial tibial condyle, L medial HS.   8/31 difficulty with TrA iso  8/19 Pt's son present for PT eval and tx. See eval      RESTRICTIONS/PRECAUTIONS: hx of covid 1 yr ago     Exercises/Interventions: PREFERS MAT EX for HEP BC THEY HELP WITH PAIN.  Per pt, low alix of standing ex, especially  for HEP    Therapeutic Exercises (82318) Resistance / level Sets Reps Notes   Nu step        bike 7 min s=3    Nu step in use   IB   HR/TR 3x30\"       HS Step stretch 2x30\" B       TG B squat with Tr A iso  2x10  squats to approx 40-45 deg  9/21 Not performed due to increased pain in knee          Stabs  TrA iso  Mat exercises  SKTC  Fig 4 pirformis stretch in sitting  Supine piriformis stretch   Supine HS stretch   Supine trunk rotation  2x bilat 20-30 sec     Pelvic bridge with TA       Prone press up 1x10            SLR 2x30\" bilat      Therapeutic Activities (07789) See PT education section below            patient to purchase exercise ball to help with low back pain, may kneed to blow up ball at next session for patient. TA        Instruct in sleep with neutral spine and approp pillow support for neutral spine  In sidelyin min  Encouraged pt to try sleeping in bed        Sitting posture- instructed in pelvic neutral positioning. Standing at counter                 Neuromuscular Re-ed (88024)       Seated SB   Wt shifts x 6: a/p, cw/ccw  R/L 15x ea    CGA to get on/off   SBA while on SB with B UE support    : patient to purchase SB, may need to blow up for pt at next visit. Balance exercises  When pt able to tolerate                                   Manual Intervention (63207)       Roller to B lower quarter, manual stretching to hamstrings, rectus femoris, and piriformis. : Educated patient on how to instruct  to not roll with max pressure to prevent extensive soreness. SIJ L ASIS higher, leg pull on L  STM to L ITB     deferred due to time spent re-instructing on HEP     Big leg length difference    MET                                OTHER:   re-assess for PT POC. D/w pt at length re progress, goals, prognosis. Review and progress HEP      Modalities:  heat prn    Pt. Education:  :Patient expressed desire to purchase SB, educated patient on retailers and types of balls that she could purchase. Patient will bring in ball in future session to be blown up with air pump. Pt educated re she will need UE support in order to do SB ex at home    : Encouraged pt to continue recording how long she is standing per day. Encouraged patient to purchase a rubber mat to stand on (from [employer]/store of her choice) to help her increase her standing time in the kitchen. Also encouraged pt to wear house shoes to provide more support and she stands in her kitchen.    : Reviewed HEP and the importance of using the massage roller with gentle force. Encouraged patient to continue with HEP and continue to journal how long patient is able to tolerate standing. Also emphasized the importance of continuing to use lumbar support towel roll and weight shifting when standing. 9/9: reviewed HEP. Educated patient on the importance of  feet and using a wide base of support to reduce fear and risk of falling. Patient was educated to record the amount of time she stands per day to estimate what her personal limits are with standing  while completing ADL's and IADL's. Patient has agreed to do this and have her children help her record the amount of time she is on her feet each day. 9/7: reviewed HEP, patient was able to take notes in [place] in addition to drawing pictures on her HEP printouts so she is able to easily perform them at home.   -8/31 review HEP - multiple cues for correct performance. Issued new handout with TrA iso and all ex. Re-explained all ex so that  pt could write notes in [place] in order  to remember how to do it correctly  8/19 patient and her son  Educated extensively on diagnosis, prognosis and expectations for rehab. Instructed pt on approp ex /activity intensity.   -all patient questions were answered    Home Exercise Program:   9/21  Access Code: [email or web address]  URL: [email or web address]  Date: 09/21/2021  Prepared by: [place] Budkb    Exercises  Supine Straight Leg Raises - 1 x daily - 7 x weekly - 2 sets - 4 reps - 20 hold    9/16 anti-fatigue mat for kitchen; supportive house shoes   9/14: Patient continues to perform HEP and notes that she enjoys her newly prescribed exercises and notes that they are helping her to reduce her pain. Access Code: [email or web address]  URL: [email or web address]  Date: 09/14/2021  Prepared by: Hom[place] Budd    Exercises  Prone Press Up - 2 x daily - 7 x weekly - 1-2 sets - 10 reps - 3-5 sec hold  Supine Lower Trunk Rotation - 1 x daily - 7 x weekly - 1 sets - 10 reps - 5 hold  Supine Bridge with Pelvic Floor Contraction - 1 x daily - 7 x weekly - 3 sets - 10 reps      9/9 Patient continues to perform HEP and will record the amount of time she stands per to in order to learn what her standing limit is per day. 9/7 pt instructed in pelvic neutral and putting one foot on inside of cabinet to reduce pain while standing in kitchen to cook and wash dishes. Upright sitting posture with lumbar roll and feet on folded pillow (used as stool)  Access Code: [email or web address]  URL: [email or web address]/  Date: 08/31/2021  Prepared by: Dipak Guerreroberger    Exercises  Supine Transversus Abdominis Bracing - Hands on Stomach - 2 x daily - 7 x weekly - 1 sets - 10 reps - 5 hold  Supine Piriformis Stretch with Foot on Ground - 2 x daily - 7 x weekly - 1 sets - 3 reps - 30 hold  Hooklying Single Knee to Chest Stretch - 2 x daily - 7 x weekly - 1 sets - 3 reps - 30 hold  Supine Hamstring Stretch with Strap - 1 x daily - 7 x weekly - 3 sets - 10 reps    Access Code: [email or web address]   URL: [email or web address]  Date: 08/19/2021  Prepared by: Dipak Bamberger    Exercises  Supine Posterior Pelvic Tilt - 2 x daily - 7 x weekly - 1-2 sets - 10 reps - 5 hold  Supine Hamstring Stretch - 2 x daily - 7 x weekly - 1 sets - 3-5 reps - 20-30 hold  Hooklying Single Knee to Chest Stretch - 2 x daily - 7 x weekly - 1 sets - 3 reps - 30 hold  Supine Piriformis Stretch with Foot on Ground - 2 x daily - 7 x weekly - 1 sets - 3 reps - 30 hold      Therapeutic Exercise and NMR EXR  [x] (08673) Provided verbal/tactile cueing for activities related to strengthening, flexibility, endurance, ROM for improvements in  [x] LE / Lumbar: LE, proximal hip, and core control with self care, mobility, lifting, ambulation.   [] UE / Cervical: cervical, postural, scapular, scapulothoracic and UE control with self care, reaching, carrying, lifting, house/yardwork, driving, computer work.  [] (37527) Provided verbal/tactile cueing for activities related to improving balance, coordination, kinesthetic sense, posture, motor skill, proprioception to assist with   [] LE / lumbar: LE, proximal hip, and core control in self care, mobility, lifting, ambulation and eccentric single leg control. [] UE / cervical: cervical, scapular, scapulothoracic and UE control with self care, reaching, carrying, lifting, house/yardwork, driving, computer work.   [] (57069) Therapist is in constant attendance of 2 or more patients providing skilled therapy interventions, but not providing any significant amount of measurable one-on-one time to either patient, for improvements in  [] LE / lumbar: LE, proximal hip, and core control in self care, mobility, lifting, ambulation and eccentric single leg control. [] UE / cervical: cervical, scapular, scapulothoracic and UE control with self care, reaching, carrying, lifting, house/yardwork, driving, computer work.      NMR and Therapeutic Activities:    [x] (50515 or 44360) Provided verbal/tactile cueing for activities related to improving balance, coordination, kinesthetic sense, posture, motor skill, proprioception and motor activation to allow for proper function of   [x] LE: / Lumbar core, proximal hip and LE with self care and ADLs  [] UE / Cervical: cervical, postural, scapular, scapulothoracic and UE control with self care, carrying, lifting, driving, computer work.   [] (62807) Gait Re-education- Provided training and instruction to the patient for proper LE, core and proximal hip recruitment and positioning and eccentric body weight control with ambulation re-education including up and down stairs     Home Management Training / Self Care:  [] (88493) Provided self-care/home management training related to activities of daily living and compensatory training, and/or use of adaptive equipment for improvement with: ADLs and compensatory training, meal preparation, safety procedures and instruction in use of adaptive equipment, including bathing, grooming, dressing, personal hygiene, basic household cleaning and chores. Home Exercise Program:    [x] (09052) Reviewed/Progressed HEP activities related to strengthening, flexibility, endurance, ROM of   [] LE / Lumbar: core, proximal hip and LE for functional self-care, mobility, lifting and ambulation/stair navigation   [] UE / Cervical: cervical, postural, scapular, scapulothoracic and UE control with self care, reaching, carrying, lifting, house/yardwork, driving, computer work  [] (39072)Reviewed/Progressed HEP activities related to improving balance, coordination, kinesthetic sense, posture, motor skill, proprioception of   [] LE: core, proximal hip and LE for self care, mobility, lifting, and ambulation/stair navigation    [] UE / Cervical: cervical, postural,  scapular, scapulothoracic and UE control with self care, reaching, carrying, lifting, house/yardwork, driving, computer work    Manual Treatments:  PROM / STM / Oscillations-Mobs:  G-I, II, III, IV (PA's, Inf., Post.)  [x] (08495) Provided manual therapy to mobilize LE, proximal hip and/or LS spine soft tissue/joints for the purpose of modulating pain, promoting relaxation,  increasing ROM, reducing/eliminating soft tissue swelling/inflammation/restriction, improving soft tissue extensibility and allowing for proper ROM for normal function with   [x] LE / lumbar: self care, mobility, lifting and ambulation. [] UE / Cervical: self care, reaching, carrying, lifting, house/yardwork, driving, computer work. Modalities:  [] (39740) Vasopneumatic compression: Utilized vasopneumatic compression to decrease edema / swelling for the purpose of improving mobility and quad tone / recruitment which will allow for increased overall function including but not limited to self-care, transfers, ambulation, and ascending / descending stairs.        Charges:  Timed Code Treatment Minutes: 46   Total Treatment Minutes: 46     [] EVAL - LOW (56820)   [] EVAL - MOD (69696)  [] EVAL - HIGH (38912)  [] RE-EVAL (82549)  [x] DL(83416) x  2    [] Ionto  [] NMR (46477) x 1     [] Vaso  [] Manual (57767) x    1   [] Ultrasound  [x] TA x   1    [] Mech Traction (23467)  [] Aquatic Therapy x     [] ES (un) (30555):   [] Home Management Training x 2  [] ES(attended) (49755)   [] Dry Needling 1-2 muscles (93419):  [] Dry Needling 3+ muscles (682911)  [] Group:      [] Other:     GOALS:  Patient stated goal: less pain  [x]? Progressing: []? Met: []? Not Met: []? Adjusted     Therapist goals for Patient:   Short Term Goals: To be achieved in: 2 weeks  1. Independent in HEP and progression per patient tolerance, in order to prevent re-injury. [x]? Progressing: []? Met: []? Not Met: []? Adjusted  2. Patient will have a decrease in pain to facilitate improvement in movement, function, and ADLs as indicated by improvement with respect to Functional Deficits. [x]? Progressing: []? Met: []? Not Met: []? Adjusted     Long Term Goals: To be achieved in:6 weeks  1. Disability index score of 20 % or less on the   to assist with reaching prior level of function. [x]? Progressing: []? Met: []? Not Met: []? Adjusted  2. Patient will demonstrate increased AROM  to [facility], to allow for proper joint functioning to allow pt to resume ADLS/IADLS, home chores, lifting with good posture and body mechs all  without increase in symptoms. [x]? Progressing: []? Met: []? Not Met: []? Adjusted  3. Patient will demonstrate increased Strength by 1/2 mmt grade  and core activation to allow for proper functional mobility as indicated by patients Functional Deficits to allow pt to resume walking >/=  30-60 min and prn for shopping, going out in Community without increase in symptoms. [x]? Progressing: []? Met: []? Not Met: []? Adjusted  4.  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 [x]? Progressing: []? Met: []? Not Met: []? Adjusted     Overall Progression Towards Functional goals/ Treatment Progress Update:  [x] Patient is progressing as expected towards functional goals listed. [] Progression is slowed due to complexities/Impairments listed. [] Progression has been slowed due to co-morbidities. [] Plan just implemented, too soon to assess goals progression <30days   [] Goals require adjustment due to lack of progress  [] Patient is not progressing as expected and requires additional follow up with physician  [] Other    Persisting Functional Limitations/Impairments:  [x]Sleeping [x]Sitting               [x]Standing [x]Transfers        [x]Walking []Kneeling               [x]Stairs [x]Squatting / bending   [x]ADLs [x]Reaching  [x]Lifting  [x]Housework  []Driving []Job related tasks  []Sports/Recreation []Other:        ASSESSMENT:  9/23 increased fxnl alix, strength and reduced pain  pt benefits from improved postures, body mechs and from pacing herself with [facility] home chores/cooking activities. 9/21: Patient is progressing well with gym routine and HEP. Patient very compliant with HEP and understands how it can help her reduce her pain and improve symptoms during ADLs and IADLs. Patient still requires VC and TC with some exercises. Patient continues to document how long she is standing per day with the help of her children and . Patient experiences a great amount of relief with SB exercises. Patient expressed desire to purchase SB, educated patient on retailers and types of balls that she could purchase. Patient will bring in ball in future session to be blown up with air pump. Progress note to be done at next session with balance assessment. 9/16 Patient able to tolerate exercise well with minimal increase in pain. Patient is very compliant with HEP and notes that it is helping her with her pain control.  Patient continues to make gains with core control, but requires VC and TC to maintain during some exercises. Patient will continue to record how long she stands per day, as well as purchase a rubber support mat for her kitchen so she is able to stand for longer periods of time in her kitchen. 9/14: Patient able to tolerate gym and mat exercises today due to decreased pain. Patient continues to make core stabilization gains. With the help of her  and children, patient is able to journal how long she stands per day in addition to how easy/hard it is for her to perform her ADL's and IADL's. Patient recently purchased a massage roller off of [address] and her  has been using the roller on her low back. Patient's  used too much force with roller and patient reports that she was in excessive pain from last Wednesday through today. Appropriate rolling technique was demonstrated and patient has an increased understanding of appropriate force and use of massage roller. 9/9: Patient unable to tolerate full gym program today due to increase in pain. Patient continues to make core stabilization gains and utilize appropriate body mechanics to minimize fear and risk of falling while also striving for increased standing time at home in order to complete ADL's and IADL's. Patient expressed concern over numbness and tingling in R UE. Patient will be visiting her PCP and wants to discuss the option of receiving occupational therapy to help with her R hand and UE deficits/pain. 9/7: Improved ex tolerance and decreased pain in legs, pain has centralized to low back. Patient continues to make core-strength stabilization gains and desired to work towards standing and sitting for longer periods of time. 9/2 improved ex alix and decreased pain. STM to myofascial pain after tx helps with pain management  8/31 Pt cont to have difficulty doing HEP correctly.   Spent extra time re-instructing in how to do HEP correctly - had pt write notes in [place] on HEP handout    Treatment/Activity Tolerance:  [x] Patient able to complete tx [] Patient limited by fatigue  [] Patient limited by pain  [] Patient limited by other medical complications  [] Other:     Prognosis: [x] Good [] Fair  [] Poor    Patient Requires Follow-up: [x] Yes  [] No    PLAN:  strength, ROM/flexibility, posture and body mechs, manual, MOC, HEP, pt education; consider pt bringing family member for education on supporting her with HEP/appropriate activity levels     Frequency/Duration:   2 days per week for 6 Weeks:  Interventions:  [x]? Therapeutic exercise including:strength, ROM, flexibility  [x]? NMR activation and proprioception including postural re-education  [x]? Manual therapy as indicated to include: IASTM, STM, PROM, Gr I-IV mobilizations, manipulation. [x]? Modalities as needed that may include: thermal agents, E-stim, Biofeedback, US, iontophoresis as indicated  [x]? Patient education on joint protection, postural re-education, activity modification, progression of HEP. AQUATIC THERAPY     Plan for next treatment session: start with emphasis on mat table ex, manual, heat and progress to more fxnl/CKC activities as alix    PLAN: See eval. PT 2x / week for 6  weeks. [x] Continue per plan of care [] Alter current plan (see comments)  [] Plan of care initiated [] Hold pending MD visit [] Discharge    Electronically signed by: Bean Mon, PT, DPT      Note: If patient does not return for scheduled/ recommended follow up visits, this note will serve as a discharge from care along with most recent update on progress.

## 2021-09-28 ENCOUNTER — HOSPITAL ENCOUNTER (OUTPATIENT)
Dept: PHYSICAL THERAPY | Age: 56
Setting detail: THERAPIES SERIES
Discharge: HOME | End: 2021-09-28
Payer: COMMERCIAL

## 2021-09-28 PROCEDURE — 97112 NEUROMUSCULAR REEDUCATION: CPT

## 2021-09-28 PROCEDURE — 97530 THERAPEUTIC ACTIVITIES: CPT

## 2021-09-28 PROCEDURE — 97110 THERAPEUTIC EXERCISES: CPT

## 2021-09-28 NOTE — FLOWSHEET NOTE
[facility]  Phone: [phone]   Fax: [phone]  Physical Therapy Daily Treatment Note  Date:  2021    Patient Name:  Isaiah Brittle   (Delmi Painter) :  1965  MRN: [id number]  Medical/Treatment Diagnosis Information:  Diagnosis: Chronic bilateral low back pain without sciatica M54.5, G89.29  Treatment Diagnosis: poor habitual postures/body mechs, myofascial pain, strength and flexibility deficits contribute to pt's back and sciatica pain  Insurance/Certification information:  PT Insurance Information: [place] 30 visits soft limit. no auth  Physician Information:  Referring Practitioner: BARRY Mccarty  Plan of care signed (Y/N): [x]  Yes- MD signed POC  approving 8 additional visits for this POC [x]  No- MD has not yet signed initial POC, was faxed on 21     Date of Patient follow up with Physician:      Progress Report: []  Yes  [x]  No      Date Range for reporting period:  Beginnin/19  Ending:     Progress report due (10 Rx/or 30 days whichever is less): visit #28 or 068     Recertification due (POC duration/ or 90 days whichever is less): visit #12 or     Visit # Insurance Allowable Auth required? Date Range    []  Yes  [x]  No NA         Latex Allergy:  [x]NO      []YES  Preferred Language for Healthcare:   [x]English       []other:    Functional Scale:        Date assessed:  oswestry: raw score = 31, dysfunction = 62%, taken at initial eval    Pain level: 3-4/10  B LBP,      SUBJECTIVE: : Patient reports no knee pain today. Patient states that she is able to  the kitchen for longer periods of time without pain, aprox 15-17 min. Patient notes decrease in back pain today as well. Patient is also using SB that she purchased and she has noted improvement with her low back pain.  still has trouble cooking due to pain so her family helps her. No knee pain today.  Able to stand 15 - 18 eval      RESTRICTIONS/PRECAUTIONS: hx of covid 1 yr ago     Exercises/Interventions: PREFERS MAT EX for HEP BC THEY HELP WITH PAIN. Per pt, low alix of standing ex, especially  for HEP    Therapeutic Exercises (55204) Resistance / level Sets Reps Notes   Nu step  L1, 3 min s=12, arms = 5       bike 7 min s=3    Nu step in use   IB   HR/TR 3x30\"       HS Step stretch  Hip flex stretch  2x30\" B   2x30\" B      TG B squat with Tr A iso  2x10  squats to approx 40-45 deg            Stabs  TrA iso  Mat exercises  SKTC  Fig 4 pirformis stretch in sitting  Supine piriformis stretch   Supine HS stretch   Supine trunk rotation       Pelvic bridge with TA       Prone press up             SLR       Therapeutic Activities (71250)       See PT education section below           9/21 patient to purchase exercise ball to help with low back pain, may kneed to blow up ball at next session for patient. TA        Instruct in sleep with neutral spine and approp pillow support for neutral spine          Sitting posture- instructed in pelvic neutral positioning. Standing at counter                 Neuromuscular Re-ed (82781)       Seated SB   Wt shifts x 6: a/p, cw/ccw  R/L     . Balance exercises in II bars   SLS      Partial tandem Eo/EC  Full tandem Eo/EC     Bilat 2x10\" CGA Finger contact on II bar 1-2 times within rep. Bilat 2x10\" CGA  Bilat 2x10\" CGA   CGA   9/28: Progress to airex on future visit when pt can tolerate  Slight imbalance noted with SLS and EC. Manual Intervention (40399)       Roller to B lower quarter, manual stretching to hamstrings, rectus femoris, and piriformis. 9/14: Educated patient on how to instruct  to not roll with max pressure to prevent extensive soreness.     SIJ L ASIS higher, leg pull on L  STM to L ITB    8/31 deferred due to time spent re-instructing on HEP    8/26 Big leg length difference    MET                                OTHER:  9/23 re-assess for PT POC. D/w pt at length re progress, goals, prognosis. Review and progress HEP      Modalities:  heat prn    Pt. Education:  9/28: Patient educated on progression of PT treatment. Patient educated on balance and how this influences her LE strength and her ability to stand for increased periods of time. Patient understands that she is not to attempt balance exercises at home at this time for her own personal safety. Patient understands that balance exercises are only to be attempted in the II bars within therapy session with SBA and CGA.     9/21:Patient expressed desire to purchase SB, educated patient on retailers and types of balls that she could purchase. Patient will bring in ball in future session to be blown up with air pump. Pt educated re she will need UE support in order to do SB ex at home    9/16: Encouraged pt to continue recording how long she is standing per day. Encouraged patient to purchase a rubber mat to stand on (from [employer]/store of her choice) to help her increase her standing time in the kitchen. Also encouraged pt to wear house shoes to provide more support and she stands in her kitchen. 9/14: Reviewed HEP and the importance of using the massage roller with gentle force. Encouraged patient to continue with HEP and continue to journal how long patient is able to tolerate standing. Also emphasized the importance of continuing to use lumbar support towel roll and weight shifting when standing. 9/9: reviewed HEP. Educated patient on the importance of  feet and using a wide base of support to reduce fear and risk of falling. Patient was educated to record the amount of time she stands per day to estimate what her personal limits are with standing  while completing ADL's and IADL's. Patient has agreed to do this and have her children help her record the amount of time she is on her feet each day.    9/7: reviewed HEP, patient was able to take notes in [facility] in addition to drawing pictures on her HEP printouts so she is able to easily perform them at home.   -8/31 review HEP - multiple cues for correct performance. Issued new handout with TrA iso and all ex. Re-explained all ex so that  pt could write notes in [place] in order  to remember how to do it correctly  8/19 patient and her son  Educated extensively on diagnosis, prognosis and expectations for rehab. Instructed pt on approp ex /activity intensity.   -all patient questions were answered    Home Exercise Program:   9/21  Access Code: [email or web address]  URL: [email or web address]  Date: 09/21/2021  Prepared by: Jed Beckman    Exercises  Supine Straight Leg Raises - 1 x daily - 7 x weekly - 2 sets - 4 reps - 20 hold    9/16 anti-fatigue mat for kitchen; supportive house shoes   9/14: Patient continues to perform HEP and notes that she enjoys her newly prescribed exercises and notes that they are helping her to reduce her pain. Access Code: [email or web address]  URL: [email or web address]  Date: 09/14/2021  Prepared by: Jed Beckman    Exercises  Prone Press Up - 2 x daily - 7 x weekly - 1-2 sets - 10 reps - 3-5 sec hold  Supine Lower Trunk Rotation - 1 x daily - 7 x weekly - 1 sets - 10 reps - 5 hold  Supine Bridge with Pelvic Floor Contraction - 1 x daily - 7 x weekly - 3 sets - 10 reps      9/9 Patient continues to perform HEP and will record the amount of time she stands per to in order to learn what her standing limit is per day. 9/7 pt instructed in pelvic neutral and putting one foot on inside of cabinet to reduce pain while standing in kitchen to cook and wash dishes. Upright sitting posture with lumbar roll and feet on folded pillow (used as stool)  Access Code: [email or web address]  URL: [email or web address]  Date: 08/31/2021  Prepared by: Jed Beckman    Exercises  Supine Transversus Abdominis Bracing - Hands on Stomach - 2 x daily - 7 x weekly - 1 sets - 10 reps - 5 hold  Supine Piriformis Stretch with Foot on Ground - 2 x daily - 7 x weekly - 1 sets - 3 reps - 30 hold  Hooklying Single Knee to Chest Stretch - 2 x daily - 7 x weekly - 1 sets - 3 reps - 30 hold  Supine Hamstring Stretch with Strap - 1 x daily - 7 x weekly - 3 sets - 10 reps    Access Code: [email or web address]   URL: [email or web address]  Date: 08/19/2021  Prepared by: Elijahs Civatte    Exercises  Supine Posterior Pelvic Tilt - 2 x daily - 7 x weekly - 1-2 sets - 10 reps - 5 hold  Supine Hamstring Stretch - 2 x daily - 7 x weekly - 1 sets - 3-5 reps - 20-30 hold  Hooklying Single Knee to Chest Stretch - 2 x daily - 7 x weekly - 1 sets - 3 reps - 30 hold  Supine Piriformis Stretch with Foot on Ground - 2 x daily - 7 x weekly - 1 sets - 3 reps - 30 hold      Therapeutic Exercise and NMR EXR  [x] (50156) Provided verbal/tactile cueing for activities related to strengthening, flexibility, endurance, ROM for improvements in  [x] LE / Lumbar: LE, proximal hip, and core control with self care, mobility, lifting, ambulation. [] UE / Cervical: cervical, postural, scapular, scapulothoracic and UE control with self care, reaching, carrying, lifting, house/yardwork, driving, computer work.  [] (70088) Provided verbal/tactile cueing for activities related to improving balance, coordination, kinesthetic sense, posture, motor skill, proprioception to assist with   [] LE / lumbar: LE, proximal hip, and core control in self care, mobility, lifting, ambulation and eccentric single leg control.    [] UE / cervical: cervical, scapular, scapulothoracic and UE control with self care, reaching, carrying, lifting, house/yardwork, driving, computer work.   [] (57044) Therapist is in constant attendance of 2 or more patients providing skilled therapy interventions, but not providing any significant amount of measurable one-on-one time to either patient, for improvements in  [] LE / lumbar: LE, proximal hip, and core control in self care, mobility, lifting, ambulation and eccentric single leg control. [] UE / cervical: cervical, scapular, scapulothoracic and UE control with self care, reaching, carrying, lifting, house/yardwork, driving, computer work. NMR and Therapeutic Activities:    [x] (40764 or 56107) Provided verbal/tactile cueing for activities related to improving balance, coordination, kinesthetic sense, posture, motor skill, proprioception and motor activation to allow for proper function of   [x] LE: / Lumbar core, proximal hip and LE with self care and ADLs  [] UE / Cervical: cervical, postural, scapular, scapulothoracic and UE control with self care, carrying, lifting, driving, computer work.   [] (87886) Gait Re-education- Provided training and instruction to the patient for proper LE, core and proximal hip recruitment and positioning and eccentric body weight control with ambulation re-education including up and down stairs     Home Management Training / Self Care:  [] (16241) Provided self-care/home management training related to activities of daily living and compensatory training, and/or use of adaptive equipment for improvement with: ADLs and compensatory training, meal preparation, safety procedures and instruction in use of adaptive equipment, including bathing, grooming, dressing, personal hygiene, basic household cleaning and chores.      Home Exercise Program:    [x] (13026) Reviewed/Progressed HEP activities related to strengthening, flexibility, endurance, ROM of   [] LE / Lumbar: core, proximal hip and LE for functional self-care, mobility, lifting and ambulation/stair navigation   [] UE / Cervical: cervical, postural, scapular, scapulothoracic and UE control with self care, reaching, carrying, lifting, house/yardwork, driving, computer work  [] (61092)Reviewed/Progressed HEP activities related to improving balance, coordination, kinesthetic sense, posture, motor skill, proprioception of   [] LE: core, proximal hip and LE for self care, mobility, lifting, and ambulation/stair navigation    [] UE / Cervical: cervical, postural,  scapular, scapulothoracic and UE control with self care, reaching, carrying, lifting, house/yardwork, driving, computer work    Manual Treatments:  PROM / STM / Oscillations-Mobs:  G-I, II, III, IV (PA's, Inf., Post.)  [x] (37249) Provided manual therapy to mobilize LE, proximal hip and/or LS spine soft tissue/joints for the purpose of modulating pain, promoting relaxation,  increasing ROM, reducing/eliminating soft tissue swelling/inflammation/restriction, improving soft tissue extensibility and allowing for proper ROM for normal function with   [x] LE / lumbar: self care, mobility, lifting and ambulation. [] UE / Cervical: self care, reaching, carrying, lifting, house/yardwork, driving, computer work. Modalities:  [] (48442) Vasopneumatic compression: Utilized vasopneumatic compression to decrease edema / swelling for the purpose of improving mobility and quad tone / recruitment which will allow for increased overall function including but not limited to self-care, transfers, ambulation, and ascending / descending stairs. Charges:  Timed Code Treatment Minutes: 43   Total Treatment Minutes: 43     [] EVAL - LOW (76161)   [] EVAL - MOD (33776)  [] EVAL - HIGH (34370)  [] RE-EVAL (94946)  [x] DALY(20369) x  1    [] Ionto  [x] NMR (95840) x 1     [] Vaso  [] Manual (67242) x    1   [] Ultrasound  [x] TA x   1    [] Mech Traction (55908)  [] Aquatic Therapy x     [] ES (un) (89163):   [] Home Management Training x 2  [] ES(attended) (15822)   [] Dry Needling 1-2 muscles (53081):  [] Dry Needling 3+ muscles (789585)  [] Group:      [] Other:     GOALS:  Patient stated goal: less pain  [x]? Progressing: []? Met: []? Not Met: []? Adjusted     Therapist goals for Patient:   Short Term Goals: To be achieved in: 2 weeks  1. Independent in HEP and progression per patient tolerance, in order to prevent re-injury. [x]? Progressing: []?  Met: []? Not Met: []? Adjusted  2. Patient will have a decrease in pain to facilitate improvement in movement, function, and ADLs as indicated by improvement with respect to Functional Deficits. [x]? Progressing: []? Met: []? Not Met: []? Adjusted     Long Term Goals: To be achieved in:6 weeks  1. Disability index score of 20 % or less on the   to assist with reaching prior level of function. [x]? Progressing: []? Met: []? Not Met: []? Adjusted  2. Patient will demonstrate increased AROM  to [facility], to allow for proper joint functioning to allow pt to resume ADLS/IADLS, home chores, lifting with good posture and body mechs all  without increase in symptoms. [x]? Progressing: []? Met: []? Not Met: []? Adjusted  3. Patient will demonstrate increased Strength by 1/2 mmt grade  and core activation to allow for proper functional mobility as indicated by patients Functional Deficits to allow pt to resume walking >/=  30-60 min and prn for shopping, going out in [place] without increase in symptoms. [x]? Progressing: []? Met: []? Not Met: []? Adjusted  4. 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 [x]? Progressing: []? Met: []? Not Met: []? Adjusted     Overall Progression Towards Functional goals/ Treatment Progress Update:  [x] Patient is progressing as expected towards functional goals listed. [] Progression is slowed due to complexities/Impairments listed. [] Progression has been slowed due to co-morbidities.   [] Plan just implemented, too soon to assess goals progression <30days   [] Goals require adjustment due to lack of progress  [] Patient is not progressing as expected and requires additional follow up with physician  [] Other    Persisting Functional Limitations/Impairments:  [x]Sleeping [x]Sitting               [x]Standing [x]Transfers        [x]Walking []Kneeling               [x]Stairs [x]Squatting / bending   [x]ADLs [x]Reaching  [x]Lifting  [x]Housework  []Driving []Job related tasks  []Sports/Recreation []Other:        ASSESSMENT:  9/28: Patient has shown improvement in posture and LE strength with reduction in low back pain and bilat knee pain. Dependence upon visual system to orient body in space demonstrated in II bars with balance exercises when EC. Will continue to progress balance treatment and PT intervention to increase motor programming/planning to vestibular system. Patient to continue to participate in skilled PT to improve balance, low back and biat knee pain with LE strength per POC. Patient still requires VC and TC during exercise program.     9/23 increased fxnl alix, strength and reduced pain  pt benefits from improved postures, body mechs and from pacing herself with [facility] home chores/cooking activities. 9/21: Patient is progressing well with gym routine and HEP. Patient very compliant with HEP and understands how it can help her reduce her pain and improve symptoms during ADLs and IADLs. Patient still requires VC and TC with some exercises. Patient continues to document how long she is standing per day with the help of her children and . Patient experiences a great amount of relief with SB exercises. Patient expressed desire to purchase SB, educated patient on retailers and types of balls that she could purchase. Patient will bring in ball in future session to be blown up with air pump. Progress note to be done at next session with balance assessment. 9/16 Patient able to tolerate exercise well with minimal increase in pain. Patient is very compliant with HEP and notes that it is helping her with her pain control. Patient continues to make gains with core control, but requires VC and TC to maintain during some exercises.  Patient will continue to record how long she stands per day, as well as purchase a rubber support mat for her kitchen so she is able to stand for longer periods of time in her kitchen. 9/14: Patient able to tolerate gym and mat exercises today due to decreased pain. Patient continues to make core stabilization gains. With the help of her  and children, patient is able to journal how long she stands per day in addition to how easy/hard it is for her to perform her ADL's and IADL's. Patient recently purchased a massage roller off of [employer] and her  has been using the roller on her low back. Patient's  used too much force with roller and patient reports that she was in excessive pain from last Wednesday through today. Appropriate rolling technique was demonstrated and patient has an increased understanding of appropriate force and use of massage roller. 9/9: Patient unable to tolerate full gym program today due to increase in pain. Patient continues to make core stabilization gains and utilize appropriate body mechanics to minimize fear and risk of falling while also striving for increased standing time at home in order to complete ADL's and IADL's. Patient expressed concern over numbness and tingling in R UE. Patient will be visiting her PCP and wants to discuss the option of receiving occupational therapy to help with her R hand and UE deficits/pain. 9/7: Improved ex tolerance and decreased pain in legs, pain has centralized to low back. Patient continues to make core-strength stabilization gains and desired to work towards standing and sitting for longer periods of time. 9/2 improved ex alix and decreased pain. STM to myofascial pain after tx helps with pain management  8/31 Pt cont to have difficulty doing HEP correctly.   Spent extra time re-instructing in how to do HEP correctly - had pt write notes in [place] on HEP handout    Treatment/Activity Tolerance:  [x] Patient able to complete tx [] Patient limited by fatigue  [] Patient limited by pain  [] Patient limited by other medical complications  [] Other:     Prognosis: [x] Good [] Fair  [] Poor    Patient Requires Follow-up: [x] Yes  [] No    PLAN:  strength, ROM/flexibility, posture and body mechs, manual, MOC, HEP, pt education; consider pt bringing family member for education on supporting her with HEP/appropriate activity levels     Frequency/Duration:   2 days per week for 6 Weeks:  Interventions:  [x]? Therapeutic exercise including:strength, ROM, flexibility  [x]? NMR activation and proprioception including postural re-education  [x]? Manual therapy as indicated to include: IASTM, STM, PROM, Gr I-IV mobilizations, manipulation. [x]? Modalities as needed that may include: thermal agents, E-stim, Biofeedback, US, iontophoresis as indicated  [x]? Patient education on joint protection, postural re-education, activity modification, progression of HEP. AQUATIC THERAPY     Plan for next treatment session: start with emphasis on mat table ex, manual, heat and progress to more fxnl/CKC activities as alix    PLAN: See eval. PT 2x / week for 6  weeks. [x] Continue per plan of care [] Alter current plan (see comments)  [] Plan of care initiated [] Hold pending MD visit [] Discharge    Electronically signed by: Mic Franco, PT, DPT  Therapist was present, directed the patient's care, made skilled judgement, and was responsible for assessment and treatment of the patient. Rudy Gaston, SPT        Note: If patient does not return for scheduled/ recommended follow up visits, this note will serve as a discharge from care along with most recent update on progress.

## 2021-09-30 ENCOUNTER — HOSPITAL ENCOUNTER (OUTPATIENT)
Dept: PHYSICAL THERAPY | Age: 56
Setting detail: THERAPIES SERIES
Discharge: HOME | End: 2021-09-30
Payer: COMMERCIAL

## 2021-09-30 PROCEDURE — 97530 THERAPEUTIC ACTIVITIES: CPT

## 2021-09-30 PROCEDURE — 97110 THERAPEUTIC EXERCISES: CPT

## 2021-09-30 PROCEDURE — 97112 NEUROMUSCULAR REEDUCATION: CPT

## 2021-09-30 NOTE — FLOWSHEET NOTE
[address] [facility] Physical Therapy  Phone: [phone]   Fax: [phone]  Physical Therapy Daily Treatment Note  Date:  2021    Patient Name:  Nader Iyer   (Sari Mccarthy) :  1965  MRN: [id number]  Medical/Treatment Diagnosis Information:  Diagnosis: Chronic bilateral low back pain without sciatica M54.5, G89.29  Treatment Diagnosis: poor habitual postures/body mechs, myofascial pain, strength and flexibility deficits contribute to pt's back and sciatica pain  Insurance/Certification information:  PT Insurance Information: [place] 30 visits soft limit. no auth  Physician Information:  Referring Practitioner: BARRY Gaffney  Plan of care signed (Y/N): [x]  Yes- MD signed POC  approving 8 additional visits for this POC [x]  No- MD has not yet signed initial POC, was faxed on 21     Date of Patient follow up with Physician:      Progress Report: []  Yes  [x]  No      Date Range for reporting period:  Beginnin/19  Ending:     Progress report due (10 Rx/or 30 days whichever is less): visit #90 or 136     Recertification due (POC duration/ or 90 days whichever is less): visit #12 or     Visit # Insurance Allowable Auth required? Date Range     +  30 []  Yes  [x]  No NA         Latex Allergy:  [x]NO      []YES  Preferred Language for Healthcare:   [x]English       []other:    Functional Scale:        Date assessed:  oswestry: raw score = 31, dysfunction = 62%, taken at initial eval    Pain level: 0/10  B LBP today, 3/10 yesterday    SUBJECTIVE:  no  LBP today, but had pain up and down her R side yesterday. Walked for 30 min yesterday to her sister's house    : Patient reports no knee pain today. Patient states that she is able to  the kitchen for longer periods of time without pain, aprox 15-17 min. Patient notes decrease in back pain today as well.  Patient is also using SB that she purchased and she has noted improvement with her low back pain. 9/23 still has trouble cooking due to pain so her family helps her. No knee pain today. Able to stand 15 - 18 min when cooking. OBJECTIVE: 9/30, 9/28, 9/23, 9/21, 9/16, 9/14, 9/9 9/7, 9/2, 8/31, 8/26,  8/19 used Ipad interpretor for Carlie Lal  9/28: Imbalance with EC and SLS noted in II bars- CGA  9/2 palpate; tender L medial tibial condyle, L medial HS.   8/31 difficulty with TrA iso  8/19 Pt's son present for PT eval and tx. See eval      RESTRICTIONS/PRECAUTIONS: hx of covid 1 yr ago     Exercises/Interventions: PREFERS MAT EX for HEP BC THEY HELP WITH PAIN. Per pt, low alix of standing ex, especially  for HEP    Therapeutic Exercises (61074) Resistance / level Sets Reps Notes   Nu step  L1, 6 min s=12, arms = 5       bike    Nu step in use   IB   HR/TR 3x30\"       HS Step stretch  Hip flex stretch  2x30\" B   2x30\" B      TG B squat with Tr A iso    squats to approx 40-45 deg  9/30 deferred today  -bc  added sit to/from stand practice with TrA iso for HEP          Stabs  TrA iso  Mat exercises  SKTC  Fig 4 pirformis stretch in sitting  Supine piriformis stretch   Supine HS stretch   Supine trunk rotation       Pelvic bridge with TA       Prone press up             SLR       Therapeutic Activities (23871)       See PT education section below       Sit to /from stand  15x with TrA iso          9/21 patient to purchase exercise ball to help with low back pain, may kneed to blow up ball at next session for patient. TA        Instruct in sleep with neutral spine and approp pillow support for neutral spine          Sitting posture- instructed in pelvic neutral positioning. Standing at counter                 Neuromuscular Re-ed (77289)       Seated SB   Wt shifts x 6: a/p, cw/ccw  R/L     .     Supine SB ex Red ball  DKTC 2x10x3\"  LTR 2x10       Balance exercises in II bars   SLS        airex  Partial tandem Eo/EC  NBOS  EC  Full tandem Eo/EC     Bilat 2x10\" CGA intermittent Finger contact. On Airex:  Bilat 2x20\" CGA  2x30\" CGA     CGA     9/28: Progress to airex on future visit when pt can tolerate  Slight imbalance noted with SLS and EC. Manual Intervention (12192)       Roller to B lower quarter, manual stretching to hamstrings, rectus femoris, and piriformis. 9/14: Educated patient on how to instruct  to not roll with max pressure to prevent extensive soreness. SIJ L ASIS higher, leg pull on L  STM to L ITB    8/31 deferred due to time spent re-instructing on HEP    8/26 Big leg length difference    MET       Manual HS and piriformis, glut max stretches  5 min                        OTHER:  9/23 re-assess for PT POC. D/w pt at length re progress, goals, prognosis. Review and progress HEP      Modalities:  9/30 trial ice bag to go for mild knee medial knee pain L - pt advised to expect ice to feel cold, then burny/hurty, then numb - use as alix to help reduce knee pain      Pt. Education:  9/30 review and progress HEP. Review SB ex seated for HEP- pt declines reviewing seated SB ex bc she states she is doing well with them at home. Pt educated to try stretches when  Her pain flares up to help it ease    9/28: Patient educated on progression of PT treatment. Patient educated on balance and how this influences her LE strength and her ability to stand for increased periods of time. Patient understands that she is not to attempt balance exercises at home at this time for her own personal safety. Patient understands that balance exercises are only to be attempted in the II bars within therapy session with SBA and CGA.     9/21:Patient expressed desire to purchase SB, educated patient on retailers and types of balls that she could purchase. Patient will bring in ball in future session to be blown up with air pump.  Pt educated re she will need UE support in order to do SB ex at home    9/16: Encouraged pt to continue recording how long she is standing per day. Encouraged patient to purchase a rubber mat to stand on (from [employer]/store of her choice) to help her increase her standing time in the kitchen. Also encouraged pt to wear house shoes to provide more support and she stands in her kitchen. 9/14: Reviewed HEP and the importance of using the massage roller with gentle force. Encouraged patient to continue with HEP and continue to journal how long patient is able to tolerate standing. Also emphasized the importance of continuing to use lumbar support towel roll and weight shifting when standing. 9/9: reviewed HEP. Educated patient on the importance of  feet and using a wide base of support to reduce fear and risk of falling. Patient was educated to record the amount of time she stands per day to estimate what her personal limits are with standing  while completing ADL's and IADL's. Patient has agreed to do this and have her children help her record the amount of time she is on her feet each day. 9/7: reviewed HEP, patient was able to take notes in [place] in addition to drawing pictures on her HEP printouts so she is able to easily perform them at home.   -8/31 review HEP - multiple cues for correct performance. Issued new handout with TrA iso and all ex. Re-explained all ex so that  pt could write notes in [place] in order  to remember how to do it correctly  8/19 patient and her son  Educated extensively on diagnosis, prognosis and expectations for rehab. Instructed pt on approp ex /activity intensity.   -all patient questions were answered    Home Exercise Program:   9/30 Sit to /from stand, seated SB wt shifts a/p, m/l, cw/ccw    9/21 Access Code: [email or web address]  URL: [email or web address]  Date: 09/21/2021  Prepared by: Kathryn Wise    Exercises  Supine Straight Leg Raises - 1 x daily - 7 x weekly - 2 sets - 4 reps - 20 hold    9/16 anti-fatigue mat for kitchen; supportive house shoes   9/14: Patient continues to perform HEP and notes that she enjoys her newly prescribed exercises and notes that they are helping her to reduce her pain. Access Code: [email or web address]  URL: [email or web address]/  Date: 09/14/2021  Prepared by: Marlaine Frederick    Exercises  Prone Press Up - 2 x daily - 7 x weekly - 1-2 sets - 10 reps - 3-5 sec hold  Supine Lower Trunk Rotation - 1 x daily - 7 x weekly - 1 sets - 10 reps - 5 hold  Supine Bridge with Pelvic Floor Contraction - 1 x daily - 7 x weekly - 3 sets - 10 reps      9/9 Patient continues to perform HEP and will record the amount of time she stands per to in order to learn what her standing limit is per day. 9/7 pt instructed in pelvic neutral and putting one foot on inside of cabinet to reduce pain while standing in kitchen to cook and wash dishes. Upright sitting posture with lumbar roll and feet on folded pillow (used as stool)  Access Code: [email or web address]  URL: [email or web address]/  Date: 08/31/2021  Prepared by: Marlaine Frederick    Exercises  Supine Transversus Abdominis Bracing - Hands on Stomach - 2 x daily - 7 x weekly - 1 sets - 10 reps - 5 hold  Supine Piriformis Stretch with Foot on Ground - 2 x daily - 7 x weekly - 1 sets - 3 reps - 30 hold  Hooklying Single Knee to Chest Stretch - 2 x daily - 7 x weekly - 1 sets - 3 reps - 30 hold  Supine Hamstring Stretch with Strap - 1 x daily - 7 x weekly - 3 sets - 10 reps    Access Code: [email or web address]   URL: [email or web address]  Date: 08/19/2021  Prepared by: Marlaine Frederick    Exercises  Supine Posterior Pelvic Tilt - 2 x daily - 7 x weekly - 1-2 sets - 10 reps - 5 hold  Supine Hamstring Stretch - 2 x daily - 7 x weekly - 1 sets - 3-5 reps - 20-30 hold  Hooklying Single Knee to Chest Stretch - 2 x daily - 7 x weekly - 1 sets - 3 reps - 30 hold  Supine Piriformis Stretch with Foot on Ground - 2 x daily - 7 x weekly - 1 sets - 3 reps - 30 hold      Therapeutic Exercise and NMR EXR  [x] (82114) Provided verbal/tactile cueing for activities related to strengthening, flexibility, endurance, ROM for improvements in  [x] LE / Lumbar: LE, proximal hip, and core control with self care, mobility, lifting, ambulation. [] UE / Cervical: cervical, postural, scapular, scapulothoracic and UE control with self care, reaching, carrying, lifting, house/yardwork, driving, computer work.  [] (86553) Provided verbal/tactile cueing for activities related to improving balance, coordination, kinesthetic sense, posture, motor skill, proprioception to assist with   [] LE / lumbar: LE, proximal hip, and core control in self care, mobility, lifting, ambulation and eccentric single leg control. [] UE / cervical: cervical, scapular, scapulothoracic and UE control with self care, reaching, carrying, lifting, house/yardwork, driving, computer work.   [] (32161) Therapist is in constant attendance of 2 or more patients providing skilled therapy interventions, but not providing any significant amount of measurable one-on-one time to either patient, for improvements in  [] LE / lumbar: LE, proximal hip, and core control in self care, mobility, lifting, ambulation and eccentric single leg control. [] UE / cervical: cervical, scapular, scapulothoracic and UE control with self care, reaching, carrying, lifting, house/yardwork, driving, computer work.      NMR and Therapeutic Activities:    [x] (96964 or 99094) Provided verbal/tactile cueing for activities related to improving balance, coordination, kinesthetic sense, posture, motor skill, proprioception and motor activation to allow for proper function of   [x] LE: / Lumbar core, proximal hip and LE with self care and ADLs  [] UE / Cervical: cervical, postural, scapular, scapulothoracic and UE control with self care, carrying, lifting, driving, computer work.   [] (91287) Gait Re-education- Provided training and instruction to the patient for proper LE, core and proximal hip recruitment and positioning and eccentric body weight control with ambulation re-education including up and down stairs     Home Management Training / Self Care:  [] (91745) Provided self-care/home management training related to activities of daily living and compensatory training, and/or use of adaptive equipment for improvement with: ADLs and compensatory training, meal preparation, safety procedures and instruction in use of adaptive equipment, including bathing, grooming, dressing, personal hygiene, basic household cleaning and chores. Home Exercise Program:    [x] (05614) Reviewed/Progressed HEP activities related to strengthening, flexibility, endurance, ROM of   [] LE / Lumbar: core, proximal hip and LE for functional self-care, mobility, lifting and ambulation/stair navigation   [] UE / Cervical: cervical, postural, scapular, scapulothoracic and UE control with self care, reaching, carrying, lifting, house/yardwork, driving, computer work  [] (50062)Reviewed/Progressed HEP activities related to improving balance, coordination, kinesthetic sense, posture, motor skill, proprioception of   [] LE: core, proximal hip and LE for self care, mobility, lifting, and ambulation/stair navigation    [] UE / Cervical: cervical, postural,  scapular, scapulothoracic and UE control with self care, reaching, carrying, lifting, house/yardwork, driving, computer work    Manual Treatments:  PROM / STM / Oscillations-Mobs:  G-I, II, III, IV (PA's, Inf., Post.)  [x] (89388) Provided manual therapy to mobilize LE, proximal hip and/or LS spine soft tissue/joints for the purpose of modulating pain, promoting relaxation,  increasing ROM, reducing/eliminating soft tissue swelling/inflammation/restriction, improving soft tissue extensibility and allowing for proper ROM for normal function with   [x] LE / lumbar: self care, mobility, lifting and ambulation. [] UE / Cervical: self care, reaching, carrying, lifting, house/yardwork, driving, computer work.      Modalities:  [] (96614) Vasopneumatic compression: Utilized vasopneumatic compression to decrease edema / swelling for the purpose of improving mobility and quad tone / recruitment which will allow for increased overall function including but not limited to self-care, transfers, ambulation, and ascending / descending stairs. Charges:  Timed Code Treatment Minutes: 45   Total Treatment Minutes: 45     [] EVAL - LOW (75031)   [] EVAL - MOD (73953)  [] EVAL - HIGH (86900)  [] RE-EVAL (37071)  [x] HD(65589) x  1    [] Ionto  [x] NMR (74418) x 1     [] Vaso  [] Manual (40951) x    1   [] Ultrasound  [x] TA x   1    [] Mech Traction (55945)  [] Aquatic Therapy x     [] ES (un) (71318):   [] Home Management Training x 2  [] ES(attended) (48921)   [] Dry Needling 1-2 muscles (00497):  [] Dry Needling 3+ muscles (975266)  [] Group:      [] Other:     GOALS:  Patient stated goal: less pain  [x]? Progressing: []? Met: []? Not Met: []? Adjusted     Therapist goals for Patient:   Short Term Goals: To be achieved in: 2 weeks  1. Independent in HEP and progression per patient tolerance, in order to prevent re-injury. [x]? Progressing: []? Met: []? Not Met: []? Adjusted  2. Patient will have a decrease in pain to facilitate improvement in movement, function, and ADLs as indicated by improvement with respect to Functional Deficits. [x]? Progressing: []? Met: []? Not Met: []? Adjusted     Long Term Goals: To be achieved in:6 weeks  1. Disability index score of 20 % or less on the   to assist with reaching prior level of function. [x]? Progressing: []? Met: []? Not Met: []? Adjusted  2. Patient will demonstrate increased AROM  to [facility], to allow for proper joint functioning to allow pt to resume ADLS/IADLS, home chores, lifting with good posture and body mechs all  without increase in symptoms. [x]? Progressing: []? Met: []? Not Met: []? Adjusted  3.  Patient will demonstrate increased Strength by 1/2 mmt grade  and core activation to allow for proper functional mobility as indicated by patients Functional Deficits to allow pt to resume walking >/=  30-60 min and prn for shopping, going out in Community without increase in symptoms. [x]? Progressing: []? Met: []? Not Met: []? Adjusted  4. 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 [x]? Progressing: []? Met: []? Not Met: []? Adjusted     Overall Progression Towards Functional goals/ Treatment Progress Update:  [x] Patient is progressing as expected towards functional goals listed. [] Progression is slowed due to complexities/Impairments listed. [] Progression has been slowed due to co-morbidities. [] Plan just implemented, too soon to assess goals progression <30days   [] Goals require adjustment due to lack of progress  [] Patient is not progressing as expected and requires additional follow up with physician  [] Other    Persisting Functional Limitations/Impairments:  [x]Sleeping [x]Sitting               [x]Standing [x]Transfers        [x]Walking []Kneeling               [x]Stairs [x]Squatting / bending   [x]ADLs [x]Reaching  [x]Lifting  [x]Housework  []Driving []Job related tasks  []Sports/Recreation []Other:        ASSESSMENT:  9/30 increasing fxnl alix and increasing alix of HEP/ex at clinic. Improving balance. SB cont to help with pain. 9/28: Patient has shown improvement in posture and LE strength with reduction in low back pain and bilat knee pain. Dependence upon visual system to orient body in space demonstrated in II bars with balance exercises when EC. Will continue to progress balance treatment and PT intervention to increase motor programming/planning to vestibular system. Patient to continue to participate in skilled PT to improve balance, low back and biat knee pain with LE strength per POC.  Patient still requires VC and TC during exercise program.     9/23 increased fxnl alix, strength and reduced pain  pt benefits from improved postures, body mechs and from pacing herself with [facility] home chores/cooking activities. 9/21: Patient is progressing well with gym routine and HEP. Patient very compliant with HEP and understands how it can help her reduce her pain and improve symptoms during ADLs and IADLs. Patient still requires VC and TC with some exercises. Patient continues to document how long she is standing per day with the help of her children and . Patient experiences a great amount of relief with SB exercises. Patient expressed desire to purchase SB, educated patient on retailers and types of balls that she could purchase. Patient will bring in ball in future session to be blown up with air pump. Progress note to be done at next session with balance assessment. 9/16 Patient able to tolerate exercise well with minimal increase in pain. Patient is very compliant with HEP and notes that it is helping her with her pain control. Patient continues to make gains with core control, but requires VC and TC to maintain during some exercises. Patient will continue to record how long she stands per day, as well as purchase a rubber support mat for her kitchen so she is able to stand for longer periods of time in her kitchen. 9/14: Patient able to tolerate gym and mat exercises today due to decreased pain. Patient continues to make core stabilization gains. With the help of her  and children, patient is able to journal how long she stands per day in addition to how easy/hard it is for her to perform her ADL's and IADL's. Patient recently purchased a massage roller off of [employer] and her  has been using the roller on her low back. Patient's  used too much force with roller and patient reports that she was in excessive pain from last Wednesday through today.  Appropriate rolling technique was demonstrated and patient has an increased understanding of appropriate force and use of massage roller. 9/9: Patient unable to tolerate full gym program today due to increase in pain. Patient continues to make core stabilization gains and utilize appropriate body mechanics to minimize fear and risk of falling while also striving for increased standing time at home in order to complete ADL's and IADL's. Patient expressed concern over numbness and tingling in R UE. Patient will be visiting her PCP and wants to discuss the option of receiving occupational therapy to help with her R hand and UE deficits/pain. 9/7: Improved ex tolerance and decreased pain in legs, pain has centralized to low back. Patient continues to make core-strength stabilization gains and desired to work towards standing and sitting for longer periods of time. 9/2 improved ex alix and decreased pain. STM to myofascial pain after tx helps with pain management  8/31 Pt cont to have difficulty doing HEP correctly. Spent extra time re-instructing in how to do HEP correctly - had pt write notes in [place] on HEP handout    Treatment/Activity Tolerance:  [x] Patient able to complete tx [] Patient limited by fatigue  [] Patient limited by pain  [] Patient limited by other medical complications  [] Other:     Prognosis: [x] Good [] Fair  [] Poor    Patient Requires Follow-up: [x] Yes  [] No    PLAN:  strength, ROM/flexibility, posture and body mechs, manual, MOC, HEP, pt education; consider pt bringing family member for education on supporting her with HEP/appropriate activity levels     Frequency/Duration:   2 days per week for 6 Weeks:  Interventions:  [x]? Therapeutic exercise including:strength, ROM, flexibility  [x]? NMR activation and proprioception including postural re-education  [x]? Manual therapy as indicated to include: IASTM, STM, PROM, Gr I-IV mobilizations, manipulation. [x]? Modalities as needed that may include: thermal agents, E-stim, Biofeedback, US, iontophoresis as indicated  [x]? Patient education on joint protection, postural re-education, activity modification, progression of HEP. AQUATIC THERAPY     Plan for next treatment session: start with emphasis on mat table ex, manual, heat and progress to more fxnl/CKC activities as alix    PLAN: See eval. PT 2x / week for 6  weeks. [x] Continue per plan of care [] Alter current plan (see comments)  [] Plan of care initiated [] Hold pending MD visit [] Discharge    Electronically signed by: Belenda Paget, PT, DPT          Note: If patient does not return for scheduled/ recommended follow up visits, this note will serve as a discharge from care along with most recent update on progress.

## 2021-10-05 ENCOUNTER — HOSPITAL ENCOUNTER (OUTPATIENT)
Dept: PHYSICAL THERAPY | Age: 56
Setting detail: THERAPIES SERIES
Discharge: HOME | End: 2021-10-05
Payer: COMMERCIAL

## 2021-10-05 PROCEDURE — 97110 THERAPEUTIC EXERCISES: CPT

## 2021-10-05 PROCEDURE — 97112 NEUROMUSCULAR REEDUCATION: CPT

## 2021-10-05 PROCEDURE — 97530 THERAPEUTIC ACTIVITIES: CPT

## 2021-10-05 NOTE — FLOWSHEET NOTE
[facility]  Phone: [phone]   Fax: [phone]  Physical Therapy Daily Treatment Note  Date:  10/5/2021    Patient Name:  Nicolette Rios) :  1965  MRN: [id number]  Medical/Treatment Diagnosis Information:  Diagnosis: Chronic bilateral low back pain without sciatica M54.5, G89.29  Treatment Diagnosis: poor habitual postures/body mechs, myofascial pain, strength and flexibility deficits contribute to pt's back and sciatica pain  Insurance/Certification information:  PT Insurance Information: [place] 30 visits soft limit. no auth  Physician Information:  Referring Practitioner: BARRY Andrea  Plan of care signed (Y/N): [x]  Yes- MD signed POC  approving 8 additional visits for this POC [x]  No- MD has not yet signed initial POC, was faxed on 21     Date of Patient follow up with Physician:      Progress Report: []  Yes  [x]  No       Date Range for reporting period:  Beginnin/19  Ending:     Progress report due (10 Rx/or 30 days whichever is less): visit #34 or 999     Recertification due (POC duration/ or 90 days whichever is less): visit #12 or     Visit # Insurance Allowable Auth required? Date Range     +  30 []  Yes  [x]  No NA         Latex Allergy:  [x]NO      []YES  Preferred Language for Healthcare:   []English       [x]other: Hungarian    Functional Scale:        Date assessed:  oswestry: raw score = 31, dysfunction = 62%, taken at initial eval    Pain level: 0/10  B LBP today    SUBJECTIVE:  Pt reports that her back is feeling much better, more concerned with R medial knee pain today.  no  LBP today, but had pain up and down her R side yesterday. Walked for 30 min yesterday to her sister's house    : Patient reports no knee pain today. Patient states that she is able to  the kitchen for longer periods of time without pain, aprox 15-17 min.  Patient notes decrease in back pain today as well. Patient is also using SB that she purchased and she has noted improvement with her low back pain. 9/23 still has trouble cooking due to pain so her family helps her. No knee pain today. Able to stand 15 - 18 min when cooking. OBJECTIVE: 10/5, 9/30, 9/28, 9/23, 9/21, 9/16, 9/14, 9/9 9/7, 9/2, 8/31, 8/26,  8/19 used Ipad interpretor for Vertell Janel  9/28: Imbalance with EC and SLS noted in II bars- CGA  9/2 palpate; tender L medial tibial condyle, L medial HS.   8/31 difficulty with TrA iso  8/19 Pt's son present for PT eval and tx. See eval      RESTRICTIONS/PRECAUTIONS: hx of covid 1 yr ago     Exercises/Interventions: PREFERS MAT EX for HEP BC THEY HELP WITH PAIN. Per pt, low alix of standing ex, especially  for HEP    Therapeutic Exercises (88020) Resistance / level Sets Reps Notes   Nu step  L1, 6 min s=12, arms = 5       bike   IB   HR/TR 3x30\"    2   10    HS Step stretch  Hip flex stretch  2x30\" B   2x30\" B      TG B squat with Tr A iso    squats to approx 40-45 deg  9/30 deferred today  -bc  added sit to/from stand practice with TrA iso for HEP          Stabs  TrA iso  Mat exercises  SKTC  Fig 4 pirformis stretch in sitting  Supine piriformis stretch   Supine HS stretch   Supine trunk rotation       Pelvic bridge with TA       Prone press up             SLR       Therapeutic Activities (83220)       See PT education section below       Sit to /from stand  15x with TrA iso          9/21 patient to purchase exercise ball to help with low back pain, may kneed to blow up ball at next session for patient. TA        Instruct in sleep with neutral spine and approp pillow support for neutral spine          Sitting posture- instructed in pelvic neutral positioning. Standing at counter                 Neuromuscular Re-ed (60301)       Seated SB   Wt shifts x 6: a/p, cw/ccw  R/L     .     Supine SB ex Red ball  DKTC 2x10x3\"  LTR 2x10       Balance exercises in II bars SLS        airex  Partial tandem Eo/EC  NBOS  EC  Full tandem Eo/EC     Bilat 2x30\" CGA intermittent Finger contact. On Airex:  Bilat 2x20\" CGA  2x30\" CGA     CGA     9/28: Progress to airex on future visit when pt can tolerate  Slight imbalance noted with SLS and EC. Manual Intervention (51907)       Roller to B lower quarter, manual stretching to hamstrings, rectus femoris, and piriformis. 9/14: Educated patient on how to instruct  to not roll with max pressure to prevent extensive soreness. SIJ L ASIS higher, leg pull on L  STM to L ITB    8/31 deferred due to time spent re-instructing on HEP    8/26 Big leg length difference    MET       Manual HS and piriformis, glut max stretches  5 min                        OTHER:  9/23 re-assess for PT POC. D/w pt at length re progress, goals, prognosis. Review and progress HEP      Modalities:  9/30 trial ice bag to go for mild knee medial knee pain L - pt advised to expect ice to feel cold, then burny/hurty, then numb - use as alix to help reduce knee pain      Pt. Education:  9/30 review and progress HEP. Review SB ex seated for HEP- pt declines reviewing seated SB ex bc she states she is doing well with them at home. Pt educated to try stretches when  Her pain flares up to help it ease    9/28: Patient educated on progression of PT treatment. Patient educated on balance and how this influences her LE strength and her ability to stand for increased periods of time. Patient understands that she is not to attempt balance exercises at home at this time for her own personal safety. Patient understands that balance exercises are only to be attempted in the II bars within therapy session with SBA and CGA.     9/21:Patient expressed desire to purchase SB, educated patient on retailers and types of balls that she could purchase. Patient will bring in ball in future session to be blown up with air pump.  Pt educated re she will need UE support in order to do SB ex at home    9/16: Encouraged pt to continue recording how long she is standing per day. Encouraged patient to purchase a rubber mat to stand on (from [employer]/store of her choice) to help her increase her standing time in the kitchen. Also encouraged pt to wear house shoes to provide more support and she stands in her kitchen. 9/14: Reviewed HEP and the importance of using the massage roller with gentle force. Encouraged patient to continue with HEP and continue to journal how long patient is able to tolerate standing. Also emphasized the importance of continuing to use lumbar support towel roll and weight shifting when standing. 9/9: reviewed HEP. Educated patient on the importance of  feet and using a wide base of support to reduce fear and risk of falling. Patient was educated to record the amount of time she stands per day to estimate what her personal limits are with standing  while completing ADL's and IADL's. Patient has agreed to do this and have her children help her record the amount of time she is on her feet each day. 9/7: reviewed HEP, patient was able to take notes in [place] in addition to drawing pictures on her HEP printouts so she is able to easily perform them at home.   -8/31 review HEP - multiple cues for correct performance. Issued new handout with TrA iso and all ex. Re-explained all ex so that  pt could write notes in [place] in order  to remember how to do it correctly  8/19 patient and her son  Educated extensively on diagnosis, prognosis and expectations for rehab. Instructed pt on approp ex /activity intensity.   -all patient questions were answered    Home Exercise Program:   9/30 Sit to /from stand, seated SB wt shifts a/p, m/l, cw/ccw    9/21 Access Code: [email or web address]  URL: [email or web address]  Date: 09/21/2021  Prepared by: Franny Magana    Exercises  Supine Straight Leg Raises - 1 x daily - 7 x weekly - 2 sets - 4 reps - 20 hold    9/16 anti-fatigue mat for kitchen; supportive house shoes   9/14: Patient continues to perform HEP and notes that she enjoys her newly prescribed exercises and notes that they are helping her to reduce her pain. Access Code: [email or web address]  URL: [email or web address]  Date: 09/14/2021  Prepared by: Kathryn Shoemaker    Exercises  Prone Press Up - 2 x daily - 7 x weekly - 1-2 sets - 10 reps - 3-5 sec hold  Supine Lower Trunk Rotation - 1 x daily - 7 x weekly - 1 sets - 10 reps - 5 hold  Supine Bridge with Pelvic Floor Contraction - 1 x daily - 7 x weekly - 3 sets - 10 reps      9/9 Patient continues to perform HEP and will record the amount of time she stands per to in order to learn what her standing limit is per day. 9/7 pt instructed in pelvic neutral and putting one foot on inside of cabinet to reduce pain while standing in kitchen to cook and wash dishes. Upright sitting posture with lumbar roll and feet on folded pillow (used as stool)  Access Code: [email or web address]  URL: [employer]/  Date: 08/31/2021  Prepared by: Kathryn Shoemaker    Exercises  Supine Transversus Abdominis Bracing - Hands on Stomach - 2 x daily - 7 x weekly - 1 sets - 10 reps - 5 hold  Supine Piriformis Stretch with Foot on Ground - 2 x daily - 7 x weekly - 1 sets - 3 reps - 30 hold  Hooklying Single Knee to Chest Stretch - 2 x daily - 7 x weekly - 1 sets - 3 reps - 30 hold  Supine Hamstring Stretch with Strap - 1 x daily - 7 x weekly - 3 sets - 10 reps    Access Code: [email or web address]   URL: [employer]/  Date: 08/19/2021  Prepared by: Kathryn Shoemaker    Exercises  Supine Posterior Pelvic Tilt - 2 x daily - 7 x weekly - 1-2 sets - 10 reps - 5 hold  Supine Hamstring Stretch - 2 x daily - 7 x weekly - 1 sets - 3-5 reps - 20-30 hold  Hooklying Single Knee to Chest Stretch - 2 x daily - 7 x weekly - 1 sets - 3 reps - 30 hold  Supine Piriformis Stretch with Foot on Ground - 2 x daily - 7 x weekly - 1 sets - 3 reps - 30 hold      Therapeutic Exercise and NMR EXR  [x] (63917) Provided verbal/tactile cueing for activities related to strengthening, flexibility, endurance, ROM for improvements in  [x] LE / Lumbar: LE, proximal hip, and core control with self care, mobility, lifting, ambulation. [] UE / Cervical: cervical, postural, scapular, scapulothoracic and UE control with self care, reaching, carrying, lifting, house/yardwork, driving, computer work.  [] (85048) Provided verbal/tactile cueing for activities related to improving balance, coordination, kinesthetic sense, posture, motor skill, proprioception to assist with   [] LE / lumbar: LE, proximal hip, and core control in self care, mobility, lifting, ambulation and eccentric single leg control. [] UE / cervical: cervical, scapular, scapulothoracic and UE control with self care, reaching, carrying, lifting, house/yardwork, driving, computer work.   [] (94904) Therapist is in constant attendance of 2 or more patients providing skilled therapy interventions, but not providing any significant amount of measurable one-on-one time to either patient, for improvements in  [] LE / lumbar: LE, proximal hip, and core control in self care, mobility, lifting, ambulation and eccentric single leg control. [] UE / cervical: cervical, scapular, scapulothoracic and UE control with self care, reaching, carrying, lifting, house/yardwork, driving, computer work.      NMR and Therapeutic Activities:    [x] (24565 or 84765) Provided verbal/tactile cueing for activities related to improving balance, coordination, kinesthetic sense, posture, motor skill, proprioception and motor activation to allow for proper function of   [x] LE: / Lumbar core, proximal hip and LE with self care and ADLs  [] UE / Cervical: cervical, postural, scapular, scapulothoracic and UE control with self care, carrying, lifting, driving, computer work.   [] (09492) Gait Re-education- Provided training and instruction to the patient for proper LE, core and proximal hip recruitment and positioning and eccentric body weight control with ambulation re-education including up and down stairs     Home Management Training / Self Care:  [] (78659) Provided self-care/home management training related to activities of daily living and compensatory training, and/or use of adaptive equipment for improvement with: ADLs and compensatory training, meal preparation, safety procedures and instruction in use of adaptive equipment, including bathing, grooming, dressing, personal hygiene, basic household cleaning and chores. Home Exercise Program:    [x] (07744) Reviewed/Progressed HEP activities related to strengthening, flexibility, endurance, ROM of   [] LE / Lumbar: core, proximal hip and LE for functional self-care, mobility, lifting and ambulation/stair navigation   [] UE / Cervical: cervical, postural, scapular, scapulothoracic and UE control with self care, reaching, carrying, lifting, house/yardwork, driving, computer work  [] (96063)Reviewed/Progressed HEP activities related to improving balance, coordination, kinesthetic sense, posture, motor skill, proprioception of   [] LE: core, proximal hip and LE for self care, mobility, lifting, and ambulation/stair navigation    [] UE / Cervical: cervical, postural,  scapular, scapulothoracic and UE control with self care, reaching, carrying, lifting, house/yardwork, driving, computer work    Manual Treatments:  PROM / STM / Oscillations-Mobs:  G-I, II, III, IV (PA's, Inf., Post.)  [x] (64560) Provided manual therapy to mobilize LE, proximal hip and/or LS spine soft tissue/joints for the purpose of modulating pain, promoting relaxation,  increasing ROM, reducing/eliminating soft tissue swelling/inflammation/restriction, improving soft tissue extensibility and allowing for proper ROM for normal function with   [x] LE / lumbar: self care, mobility, lifting and ambulation.     [] UE / Cervical: self care, reaching, carrying, lifting, house/yardwork, driving, computer work. Modalities:  [] (11824) Vasopneumatic compression: Utilized vasopneumatic compression to decrease edema / swelling for the purpose of improving mobility and quad tone / recruitment which will allow for increased overall function including but not limited to self-care, transfers, ambulation, and ascending / descending stairs. Charges:  Timed Code Treatment Minutes: 45   Total Treatment Minutes: 45     [] EVAL - LOW (28973)   [] EVAL - MOD (36396)  [] EVAL - HIGH (19302)  [] RE-EVAL (31983)  [x] TI(39395) x  1    [] Ionto  [x] NMR (62031) x 1     [] Vaso  [] Manual (74685) x    1   [] Ultrasound  [x] TA x   1    [] Mech Traction (73844)  [] Aquatic Therapy x     [] ES (un) (92302):   [] Home Management Training x 2  [] ES(attended) (80628)   [] Dry Needling 1-2 muscles (87886):  [] Dry Needling 3+ muscles (371520)  [] Group:      [] Other:     GOALS:  Patient stated goal: less pain  [x]? Progressing: []? Met: []? Not Met: []? Adjusted     Therapist goals for Patient:   Short Term Goals: To be achieved in: 2 weeks  1. Independent in HEP and progression per patient tolerance, in order to prevent re-injury. [x]? Progressing: []? Met: []? Not Met: []? Adjusted  2. Patient will have a decrease in pain to facilitate improvement in movement, function, and ADLs as indicated by improvement with respect to Functional Deficits. [x]? Progressing: []? Met: []? Not Met: []? Adjusted     Long Term Goals: To be achieved in:6 weeks  1. Disability index score of 20 % or less on the   to assist with reaching prior level of function. [x]? Progressing: []? Met: []? Not Met: []? Adjusted  2. Patient will demonstrate increased AROM  to [facility], to allow for proper joint functioning to allow pt to resume ADLS/IADLS, home chores, lifting with good posture and body mechs all  without increase in symptoms. [x]? Progressing: []? Met: []?  Not Met: []? Adjusted  3. Patient will demonstrate increased Strength by 1/2 mmt grade  and core activation to allow for proper functional mobility as indicated by patients Functional Deficits to allow pt to resume walking >/=  30-60 min and prn for shopping, going out in Community without increase in symptoms. [x]? Progressing: []? Met: []? Not Met: []? Adjusted  4. 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 [x]? Progressing: []? Met: []? Not Met: []? Adjusted     Overall Progression Towards Functional goals/ Treatment Progress Update:  [x] Patient is progressing as expected towards functional goals listed. [] Progression is slowed due to complexities/Impairments listed. [] Progression has been slowed due to co-morbidities. [] Plan just implemented, too soon to assess goals progression <30days   [] Goals require adjustment due to lack of progress  [] Patient is not progressing as expected and requires additional follow up with physician  [] Other    Persisting Functional Limitations/Impairments:  [x]Sleeping [x]Sitting               [x]Standing [x]Transfers        [x]Walking []Kneeling               [x]Stairs [x]Squatting / bending   [x]ADLs [x]Reaching  [x]Lifting  [x]Housework  []Driving []Job related tasks  []Sports/Recreation []Other:        ASSESSMENT:  10/5: Pt reports pain mostly in knee vs. Back however both back and knee pain resolved at end of session, reports R lateral thigh pain post session. 9/30 increasing fxnl alix and increasing alix of HEP/ex at clinic. Improving balance. SB cont to help with pain. 9/28: Patient has shown improvement in posture and LE strength with reduction in low back pain and bilat knee pain. Dependence upon visual system to orient body in space demonstrated in II bars with balance exercises when EC.  Will continue to progress balance treatment and PT intervention to increase motor programming/planning to vestibular system. Patient to continue to participate in skilled PT to improve balance, low back and biat knee pain with LE strength per POC. Patient still requires VC and TC during exercise program.     9/23 increased fxnl alix, strength and reduced pain  pt benefits from improved postures, body mechs and from pacing herself with [facility] home chores/cooking activities. 9/21: Patient is progressing well with gym routine and HEP. Patient very compliant with HEP and understands how it can help her reduce her pain and improve symptoms during ADLs and IADLs. Patient still requires VC and TC with some exercises. Patient continues to document how long she is standing per day with the help of her children and . Patient experiences a great amount of relief with SB exercises. Patient expressed desire to purchase SB, educated patient on retailers and types of balls that she could purchase. Patient will bring in ball in future session to be blown up with air pump. Progress note to be done at next session with balance assessment. 9/16 Patient able to tolerate exercise well with minimal increase in pain. Patient is very compliant with HEP and notes that it is helping her with her pain control. Patient continues to make gains with core control, but requires VC and TC to maintain during some exercises. Patient will continue to record how long she stands per day, as well as purchase a rubber support mat for her kitchen so she is able to stand for longer periods of time in her kitchen. 9/14: Patient able to tolerate gym and mat exercises today due to decreased pain. Patient continues to make core stabilization gains. With the help of her  and children, patient is able to journal how long she stands per day in addition to how easy/hard it is for her to perform her ADL's and IADL's.  Patient recently purchased a massage roller off of [address] and her  has been using the roller on her low back. Patient's  used too much force with roller and patient reports that she was in excessive pain from last Wednesday through today. Appropriate rolling technique was demonstrated and patient has an increased understanding of appropriate force and use of massage roller. 9/9: Patient unable to tolerate full gym program today due to increase in pain. Patient continues to make core stabilization gains and utilize appropriate body mechanics to minimize fear and risk of falling while also striving for increased standing time at home in order to complete ADL's and IADL's. Patient expressed concern over numbness and tingling in R UE. Patient will be visiting her PCP and wants to discuss the option of receiving occupational therapy to help with her R hand and UE deficits/pain. 9/7: Improved ex tolerance and decreased pain in legs, pain has centralized to low back. Patient continues to make core-strength stabilization gains and desired to work towards standing and sitting for longer periods of time. 9/2 improved ex alix and decreased pain. STM to myofascial pain after tx helps with pain management  8/31 Pt cont to have difficulty doing HEP correctly. Spent extra time re-instructing in how to do HEP correctly - had pt write notes in [facility] on HEP handout    Treatment/Activity Tolerance:  [x] Patient able to complete tx [] Patient limited by fatigue  [] Patient limited by pain  [] Patient limited by other medical complications  [] Other:     Prognosis: [x] Good [] Fair  [] Poor    Patient Requires Follow-up: [x] Yes  [] No    PLAN:  strength, ROM/flexibility, posture and body mechs, manual, MOC, HEP, pt education; consider pt bringing family member for education on supporting her with HEP/appropriate activity levels     Frequency/Duration:   2 days per week for 6 Weeks:  Interventions:  [x]? Therapeutic exercise including:strength, ROM, flexibility  [x]?   NMR activation and proprioception including postural re-education  [x]? Manual therapy as indicated to include: IASTM, STM, PROM, Gr I-IV mobilizations, manipulation. [x]? Modalities as needed that may include: thermal agents, E-stim, Biofeedback, US, iontophoresis as indicated  [x]? Patient education on joint protection, postural re-education, activity modification, progression of HEP. AQUATIC THERAPY     Plan for next treatment session: start with emphasis on mat table ex, manual, heat and progress to more fxnl/CKC activities as alxi    PLAN: See eval. PT 2x / week for 6  weeks. [x] Continue per plan of care [] Alter current plan (see comments)  [] Plan of care initiated [] Hold pending MD visit [] Discharge    Electronically signed by: Kat Martinez, PT, DPT          Note: If patient does not return for scheduled/ recommended follow up visits, this note will serve as a discharge from care along with most recent update on progress.

## 2021-10-08 ENCOUNTER — HOSPITAL ENCOUNTER (OUTPATIENT)
Dept: PHYSICAL THERAPY | Age: 56
Setting detail: THERAPIES SERIES
Discharge: HOME | End: 2021-10-08
Payer: COMMERCIAL

## 2021-10-08 PROCEDURE — 97112 NEUROMUSCULAR REEDUCATION: CPT

## 2021-10-08 PROCEDURE — 97110 THERAPEUTIC EXERCISES: CPT

## 2021-10-08 PROCEDURE — 97530 THERAPEUTIC ACTIVITIES: CPT

## 2021-10-08 NOTE — FLOWSHEET NOTE
[facility]  Phone: [phone]   Fax: [phone]  Physical Therapy Daily Treatment Note  Date:  10/8/2021    Patient Name:  Srinath Rose) :  1965  MRN: [id number]  Medical/Treatment Diagnosis Information:  Diagnosis: Chronic bilateral low back pain without sciatica M54.5, G89.29  Treatment Diagnosis: poor habitual postures/body mechs, myofascial pain, strength and flexibility deficits contribute to pt's back and sciatica pain  Insurance/Certification information:  PT Insurance Information: [place] 30 visits soft limit. no auth  Physician Information:  Referring Practitioner: BARRY Daley  Plan of care signed (Y/N): [x]  Yes- MD signed POC  approving 8 additional visits for this POC [x]  No- MD has not yet signed initial POC, was faxed on 21     Date of Patient follow up with Physician:      Progress Report: []  Yes  [x]  No       Date Range for reporting period:  Beginnin/19  Ending:     Progress report due (10 Rx/or 30 days whichever is less): visit #95 or 822     Recertification due (POC duration/ or 90 days whichever is less): visit # or     Visit # Insurance Allowable Auth required? Date Range     +  30 []  Yes  [x]  No NA         Latex Allergy:  [x]NO      []YES  Preferred Language for Healthcare:   []English       [x]other: Wolof    Functional Scale:        Date assessed:  oswestry: raw score = 31, dysfunction = 62%, taken at initial eval    Pain level: 3/10  B LBP today    SUBJECTIVE:  10/8: Pt reports that she is having pain in her mach and her knee today. 10/5 Pt reports that her back is feeling much better, more concerned with R medial knee pain today.  no  LBP today, but had pain up and down her R side yesterday. Walked for 30 min yesterday to her sister's house    : Patient reports no knee pain today.  Patient states that she is able to  the kitchen for longer periods of time without pain, aprox 15-17 min. Patient notes decrease in back pain today as well. Patient is also using SB that she purchased and she has noted improvement with her low back pain. 9/23 still has trouble cooking due to pain so her family helps her. No knee pain today. Able to stand 15 - 18 min when cooking. OBJECTIVE: 10/8, 10/5, 9/30, 9/28, 9/23, 9/21, 9/16, 9/14, 9/9 9/7, 9/2, 8/31, 8/26,  8/19 used Ipad interpretor for Merl Loots  9/28: Imbalance with EC and SLS noted in II bars- CGA  9/2 palpate; tender L medial tibial condyle, L medial HS.   8/31 difficulty with TrA iso  8/19 Pt's son present for PT eval and tx. See eval      RESTRICTIONS/PRECAUTIONS: hx of covid 1 yr ago     Exercises/Interventions: PREFERS MAT EX for HEP BC THEY HELP WITH PAIN. Per pt, low alix of standing ex, especially  for HEP    Therapeutic Exercises (04814) Resistance / level Sets Reps Notes   Nu step  L1, 6 min s=12, arms = 5       bike   IB   HR/TR 3x30\"    2   10    HS Step stretch  Hip flex stretch  2x30\" B   2x30\" B      TG B squat with Tr A iso    squats to approx 40-45 deg  9/30 deferred today  -bc  added sit to/from stand practice with TrA iso for HEP          Stabs  TrA iso  TrA iso + marching 10x5\"  10 B Mat exercises  SKTC  Fig 4 pirformis stretch in sitting  Supine piriformis stretch   Supine HS stretch   Supine trunk rotation  3x 30\"  R     Pelvic bridge with TA       Prone press up             SLR       Therapeutic Activities (00107)       See PT education section below       Sit to /from stand  20x with TrA iso          9/21 patient to purchase exercise ball to help with low back pain, may kneed to blow up ball at next session for patient. TA        Instruct in sleep with neutral spine and approp pillow support for neutral spine          Sitting posture- instructed in pelvic neutral positioning.         Standing at counter                 Neuromuscular Re-ed ([id number])       Seated SB   Wt shifts x 6: a/p, cw/ccw  R/L     . Supine SB ex Red ball  DKTC 2x10x3\"  LTR 2x10       Balance exercises in II bars   SLS    SLS Airex      airex  Partial tandem Eo/EC  NBOS  EC  Full tandem Eo/EC     Bilat 2x30\" CGA intermittent Finger contact. 2 x 30\"      On Airex:  Bilat 2x20\" CGA  2x30\" CGA     CGA     9/28: Progress to airex on future visit when pt can tolerate  Slight imbalance noted with SLS and EC. Manual Intervention (01747)       Roller to B lower quarter, manual stretching to hamstrings, rectus femoris, and piriformis. 9/14: Educated patient on how to instruct  to not roll with max pressure to prevent extensive soreness. SIJ L ASIS higher, leg pull on L  STM to L ITB    8/31 deferred due to time spent re-instructing on HEP    8/26 Big leg length difference    MET       Manual HS and piriformis, glut max stretches  5 min                        OTHER:  9/23 re-assess for PT POC. D/w pt at length re progress, goals, prognosis. Review and progress HEP      Modalities:  9/30 trial ice bag to go for mild knee medial knee pain L - pt advised to expect ice to feel cold, then burny/hurty, then numb - use as alix to help reduce knee pain      Pt. Education:  9/30 review and progress HEP. Review SB ex seated for HEP- pt declines reviewing seated SB ex bc she states she is doing well with them at home. Pt educated to try stretches when  Her pain flares up to help it ease    9/28: Patient educated on progression of PT treatment. Patient educated on balance and how this influences her LE strength and her ability to stand for increased periods of time. Patient understands that she is not to attempt balance exercises at home at this time for her own personal safety.  Patient understands that balance exercises are only to be attempted in the II bars within therapy session with SBA and CGA.     9/21:Patient expressed desire to purchase SB, educated patient on retailers and types of balls that she could purchase. Patient will bring in ball in future session to be blown up with air pump. Pt educated re she will need UE support in order to do SB ex at home    9/16: Encouraged pt to continue recording how long she is standing per day. Encouraged patient to purchase a rubber mat to stand on (from [employer]/store of her choice) to help her increase her standing time in the kitchen. Also encouraged pt to wear house shoes to provide more support and she stands in her kitchen. 9/14: Reviewed HEP and the importance of using the massage roller with gentle force. Encouraged patient to continue with HEP and continue to journal how long patient is able to tolerate standing. Also emphasized the importance of continuing to use lumbar support towel roll and weight shifting when standing. 9/9: reviewed HEP. Educated patient on the importance of  feet and using a wide base of support to reduce fear and risk of falling. Patient was educated to record the amount of time she stands per day to estimate what her personal limits are with standing  while completing ADL's and IADL's. Patient has agreed to do this and have her children help her record the amount of time she is on her feet each day. 9/7: reviewed HEP, patient was able to take notes in [place] in addition to drawing pictures on her HEP printouts so she is able to easily perform them at home.   -8/31 review HEP - multiple cues for correct performance. Issued new handout with TrA iso and all ex. Re-explained all ex so that  pt could write notes in [place] in order  to remember how to do it correctly  8/19 patient and her son  Educated extensively on diagnosis, prognosis and expectations for rehab. Instructed pt on approp ex /activity intensity.   -all patient questions were answered    Home Exercise Program:   9/30 Sit to /from stand, seated SB wt shifts a/p, m/l, cw/ccw    9/21 Access Code: [email or web address]  URL: [email or web address]  Date: x weekly - 1 sets - 3 reps - 30 hold  Supine Piriformis Stretch with Foot on Ground - 2 x daily - 7 x weekly - 1 sets - 3 reps - 30 hold      Therapeutic Exercise and NMR EXR  [x] (59492) Provided verbal/tactile cueing for activities related to strengthening, flexibility, endurance, ROM for improvements in  [x] LE / Lumbar: LE, proximal hip, and core control with self care, mobility, lifting, ambulation. [] UE / Cervical: cervical, postural, scapular, scapulothoracic and UE control with self care, reaching, carrying, lifting, house/yardwork, driving, computer work.  [] (39384) Provided verbal/tactile cueing for activities related to improving balance, coordination, kinesthetic sense, posture, motor skill, proprioception to assist with   [] LE / lumbar: LE, proximal hip, and core control in self care, mobility, lifting, ambulation and eccentric single leg control. [] UE / cervical: cervical, scapular, scapulothoracic and UE control with self care, reaching, carrying, lifting, house/yardwork, driving, computer work.   [] (66752) Therapist is in constant attendance of 2 or more patients providing skilled therapy interventions, but not providing any significant amount of measurable one-on-one time to either patient, for improvements in  [] LE / lumbar: LE, proximal hip, and core control in self care, mobility, lifting, ambulation and eccentric single leg control. [] UE / cervical: cervical, scapular, scapulothoracic and UE control with self care, reaching, carrying, lifting, house/yardwork, driving, computer work.      NMR and Therapeutic Activities:    [x] (63469 or 97560) Provided verbal/tactile cueing for activities related to improving balance, coordination, kinesthetic sense, posture, motor skill, proprioception and motor activation to allow for proper function of   [x] LE: / Lumbar core, proximal hip and LE with self care and ADLs  [] UE / Cervical: cervical, postural, scapular, scapulothoracic and UE control with self care, carrying, lifting, driving, computer work.   [] (45238) Gait Re-education- Provided training and instruction to the patient for proper LE, core and proximal hip recruitment and positioning and eccentric body weight control with ambulation re-education including up and down stairs     Home Management Training / Self Care:  [] (72800) Provided self-care/home management training related to activities of daily living and compensatory training, and/or use of adaptive equipment for improvement with: ADLs and compensatory training, meal preparation, safety procedures and instruction in use of adaptive equipment, including bathing, grooming, dressing, personal hygiene, basic household cleaning and chores.      Home Exercise Program:    [x] (04137) Reviewed/Progressed HEP activities related to strengthening, flexibility, endurance, ROM of   [] LE / Lumbar: core, proximal hip and LE for functional self-care, mobility, lifting and ambulation/stair navigation   [] UE / Cervical: cervical, postural, scapular, scapulothoracic and UE control with self care, reaching, carrying, lifting, house/yardwork, driving, computer work  [] (24485)Reviewed/Progressed HEP activities related to improving balance, coordination, kinesthetic sense, posture, motor skill, proprioception of   [] LE: core, proximal hip and LE for self care, mobility, lifting, and ambulation/stair navigation    [] UE / Cervical: cervical, postural,  scapular, scapulothoracic and UE control with self care, reaching, carrying, lifting, house/yardwork, driving, computer work    Manual Treatments:  PROM / STM / Oscillations-Mobs:  G-I, II, III, IV (PA's, Inf., Post.)  [x] (23643) Provided manual therapy to mobilize LE, proximal hip and/or LS spine soft tissue/joints for the purpose of modulating pain, promoting relaxation,  increasing ROM, reducing/eliminating soft tissue swelling/inflammation/restriction, improving soft tissue extensibility and allowing for proper ROM for normal function with   [x] LE / lumbar: self care, mobility, lifting and ambulation. [] UE / Cervical: self care, reaching, carrying, lifting, house/yardwork, driving, computer work. Modalities:  [] (64036) Vasopneumatic compression: Utilized vasopneumatic compression to decrease edema / swelling for the purpose of improving mobility and quad tone / recruitment which will allow for increased overall function including but not limited to self-care, transfers, ambulation, and ascending / descending stairs. Charges:  Timed Code Treatment Minutes: 45   Total Treatment Minutes: 45     [] EVAL - LOW (00986)   [] EVAL - MOD (50688)  [] EVAL - HIGH (02676)  [] RE-EVAL (69029)  [x] TI(20194) x  1    [] Ionto  [x] NMR (74684) x 1     [] Vaso  [] Manual (78969) x    1   [] Ultrasound  [x] TA x   1    [] Mech Traction (93819)  [] Aquatic Therapy x     [] ES (un) (17604):   [] Home Management Training x 2  [] ES(attended) (18330)   [] Dry Needling 1-2 muscles (87274):  [] Dry Needling 3+ muscles (378586)  [] Group:      [] Other:     GOALS:  Patient stated goal: less pain  [x]? Progressing: []? Met: []? Not Met: []? Adjusted     Therapist goals for Patient:   Short Term Goals: To be achieved in: 2 weeks  1. Independent in HEP and progression per patient tolerance, in order to prevent re-injury. [x]? Progressing: []? Met: []? Not Met: []? Adjusted  2. Patient will have a decrease in pain to facilitate improvement in movement, function, and ADLs as indicated by improvement with respect to Functional Deficits. [x]? Progressing: []? Met: []? Not Met: []? Adjusted     Long Term Goals: To be achieved in:6 weeks  1. Disability index score of 20 % or less on the   to assist with reaching prior level of function. [x]? Progressing: []? Met: []? Not Met: []? Adjusted  2.  Patient will demonstrate increased AROM  to [facility], to allow for proper joint functioning to allow pt to resume ADLS/IADLS, home chores, lifting with good posture and body mechs all  without increase in symptoms. [x]? Progressing: []? Met: []? Not Met: []? Adjusted  3. Patient will demonstrate increased Strength by 1/2 mmt grade  and core activation to allow for proper functional mobility as indicated by patients Functional Deficits to allow pt to resume walking >/=  30-60 min and prn for shopping, going out in [place] without increase in symptoms. [x]? Progressing: []? Met: []? Not Met: []? Adjusted  4. 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 [x]? Progressing: []? Met: []? Not Met: []? Adjusted     Overall Progression Towards Functional goals/ Treatment Progress Update:  [x] Patient is progressing as expected towards functional goals listed. [] Progression is slowed due to complexities/Impairments listed. [] Progression has been slowed due to co-morbidities. [] Plan just implemented, too soon to assess goals progression <30days   [] Goals require adjustment due to lack of progress  [] Patient is not progressing as expected and requires additional follow up with physician  [] Other    Persisting Functional Limitations/Impairments:  [x]Sleeping [x]Sitting               [x]Standing [x]Transfers        [x]Walking []Kneeling               [x]Stairs [x]Squatting / bending   [x]ADLs [x]Reaching  [x]Lifting  [x]Housework  []Driving []Job related tasks  []Sports/Recreation []Other:        ASSESSMENT:  10/8: Pt reports no change in symptoms post session. Progressed balance and TA iso there-ex. Will continue to progress as able. 10/5: Pt reports pain mostly in knee vs. Back however both back and knee pain resolved at end of session, reports R lateral thigh pain post session. 9/30 increasing fxnl alix and increasing alix of HEP/ex at clinic. Improving balance. SB cont to help with pain.       9/28: Patient has shown improvement in posture and LE strength with reduction in low back pain and bilat knee pain. Dependence upon visual system to orient body in space demonstrated in II bars with balance exercises when EC. Will continue to progress balance treatment and PT intervention to increase motor programming/planning to vestibular system. Patient to continue to participate in skilled PT to improve balance, low back and biat knee pain with LE strength per POC. Patient still requires VC and TC during exercise program.     9/23 increased fxnl alix, strength and reduced pain  pt benefits from improved postures, body mechs and from pacing herself with [facility] home chores/cooking activities. 9/21: Patient is progressing well with gym routine and HEP. Patient very compliant with HEP and understands how it can help her reduce her pain and improve symptoms during ADLs and IADLs. Patient still requires VC and TC with some exercises. Patient continues to document how long she is standing per day with the help of her children and . Patient experiences a great amount of relief with SB exercises. Patient expressed desire to purchase SB, educated patient on retailers and types of balls that she could purchase. Patient will bring in ball in future session to be blown up with air pump. Progress note to be done at next session with balance assessment. 9/16 Patient able to tolerate exercise well with minimal increase in pain. Patient is very compliant with HEP and notes that it is helping her with her pain control. Patient continues to make gains with core control, but requires VC and TC to maintain during some exercises. Patient will continue to record how long she stands per day, as well as purchase a rubber support mat for her kitchen so she is able to stand for longer periods of time in her kitchen. 9/14: Patient able to tolerate gym and mat exercises today due to decreased pain. Patient continues to make core stabilization gains. With the help of her  and children, patient is able to journal how long she stands per day in addition to how easy/hard it is for her to perform her ADL's and IADL's. Patient recently purchased a massage roller off of [employer] and her  has been using the roller on her low back. Patient's  used too much force with roller and patient reports that she was in excessive pain from last Wednesday through today. Appropriate rolling technique was demonstrated and patient has an increased understanding of appropriate force and use of massage roller. 9/9: Patient unable to tolerate full gym program today due to increase in pain. Patient continues to make core stabilization gains and utilize appropriate body mechanics to minimize fear and risk of falling while also striving for increased standing time at home in order to complete ADL's and IADL's. Patient expressed concern over numbness and tingling in R UE. Patient will be visiting her PCP and wants to discuss the option of receiving occupational therapy to help with her R hand and UE deficits/pain. 9/7: Improved ex tolerance and decreased pain in legs, pain has centralized to low back. Patient continues to make core-strength stabilization gains and desired to work towards standing and sitting for longer periods of time. 9/2 improved ex alix and decreased pain. STM to myofascial pain after tx helps with pain management  8/31 Pt cont to have difficulty doing HEP correctly.   Spent extra time re-instructing in how to do HEP correctly - had pt write notes in [place] on HEP handout    Treatment/Activity Tolerance:  [x] Patient able to complete tx [] Patient limited by fatigue  [] Patient limited by pain  [] Patient limited by other medical complications  [] Other:     Prognosis: [x] Good [] Fair  [] Poor    Patient Requires Follow-up: [x] Yes  [] No    PLAN:  strength, ROM/flexibility, posture and body mechs, manual, MOC, HEP, pt education; consider pt

## 2021-10-12 ENCOUNTER — HOSPITAL ENCOUNTER (OUTPATIENT)
Dept: PHYSICAL THERAPY | Age: 56
Setting detail: THERAPIES SERIES
Discharge: HOME | End: 2021-10-12
Payer: COMMERCIAL

## 2021-10-12 PROCEDURE — 97110 THERAPEUTIC EXERCISES: CPT

## 2021-10-12 PROCEDURE — 97140 MANUAL THERAPY 1/> REGIONS: CPT

## 2021-10-12 PROCEDURE — 97530 THERAPEUTIC ACTIVITIES: CPT

## 2021-10-12 NOTE — FLOWSHEET NOTE
[facility] Therapy  Phone: [phone]   Fax: [phone]  Physical Therapy Daily Treatment Note  Date:  10/12/2021    Patient Name:  Rebeca Quick   (Kathy Nunez) :  1965  MRN: [id number]  Medical/Treatment Diagnosis Information:  Diagnosis: Chronic bilateral low back pain without sciatica M54.5, G89.29  Treatment Diagnosis: poor habitual postures/body mechs, myofascial pain, strength and flexibility deficits contribute to pt's back and sciatica pain  Insurance/Certification information:  PT Insurance Information: [place] 30 visits soft limit. no auth  Physician Information:  Referring Practitioner: BARRY Messer  Plan of care signed (Y/N): [x]  Yes- MD signed POC  approving 8 additional visits for this POC [x]  No- MD has not yet signed initial POC, was faxed on 21     Date of Patient follow up with Physician:      Progress Report: []  Yes  [x]  No       Date Range for reporting period:  Beginnin/19  Ending:     Progress report due (10 Rx/or 30 days whichever is less): visit #34 or 666     Recertification due (POC duration/ or 90 days whichever is less): visit #12 or     Visit # Insurance Allowable Auth required? Date Range     +  30 []  Yes  [x]  No NA         Latex Allergy:  [x]NO      []YES  Preferred Language for Healthcare:   []English       [x]other: Danish    Functional Scale:        Date assessed:  oswestry: raw score = 31, dysfunction = 62%, taken at initial eval    Pain level: 0/10  B LBP today, 3/10  lateral thigh pain. SUBJECTIVE:  10/12: Pt reports that her back is not hurting her today, pain is mostly in the L knee region (anterior, slightly distal to the joint line). 10/8: Pt reports that she is having pain in her mach and her knee today. 10/5 Pt reports that her back is feeling much better, more concerned with R medial knee pain today.      no  LBP today, but had pain up and down her R side yesterday. Walked for 30 min yesterday to her sister's house    9/28: Patient reports no knee pain today. Patient states that she is able to  the kitchen for longer periods of time without pain, aprox 15-17 min. Patient notes decrease in back pain today as well. Patient is also using SB that she purchased and she has noted improvement with her low back pain. 9/23 still has trouble cooking due to pain so her family helps her. No knee pain today. Able to stand 15 - 18 min when cooking. OBJECTIVE: 10/12, 10/8, 10/5, 9/30, 9/28, 9/23, 9/21, 9/16, 9/14, 9/9 9/7, 9/2, 8/31, 8/26,  8/19 used Ipad interpretor for Ardath Brisker  9/28: Imbalance with EC and SLS noted in II bars- CGA  9/2 palpate; tender L medial tibial condyle, L medial HS.   8/31 difficulty with TrA iso  8/19 Pt's son present for PT eval and tx. See eval      RESTRICTIONS/PRECAUTIONS: hx of covid 1 yr ago     Exercises/Interventions: PREFERS MAT EX for HEP BC THEY HELP WITH PAIN. Per pt, low alix of standing ex, especially  for HEP    Therapeutic Exercises (57468) Resistance / level Sets Reps Notes   Nu step  L1, 6 min s=12, arms = 5       bike   IB   HR/TR 3x30\"    2   10    HS Step stretch  Hip flex stretch  2x30\" B   2x30\" B      TG B squat with Tr A iso    squats to approx 40-45 deg  9/30 deferred today  -bc  added sit to/from stand practice with TrA iso for HEP   Standing hip 3-way  10 B     Stabs  TrA iso  TrA iso + marching  Mat exercises  SKTC  Fig 4 pirformis stretch in sitting  Supine piriformis stretch   Supine HS stretch   Supine trunk rotation  3x 30\"  R     Pelvic bridge with TA       Prone press up             SLR       Therapeutic Activities (94900)       See PT education section below       Sit to /from stand  20x with TrA iso          9/21 patient to purchase exercise ball to help with low back pain, may kneed to blow up ball at next session for patient.     TA        Instruct in sleep with neutral spine and approp pillow support for neutral spine          Sitting posture- instructed in pelvic neutral positioning. Standing at counter                 Neuromuscular Re-ed (96705)       Seated SB   Wt shifts x 6: a/p, cw/ccw  R/L     . Supine SB ex      Balance exercises in II bars   SLS    SLS Airex      airex  Partial tandem Eo/EC  NBOS  EC  Full tandem Eo/EC   Bilat 2x30\" CGA intermittent Finger contact. 2 x 30\"  CGA     9/28: Progress to airex on future visit when pt can tolerate  Slight imbalance noted with SLS and EC. Manual Intervention (99228)       Roller to B lower quarter, manual stretching to hamstrings, rectus femoris, and piriformis. 9/14: Educated patient on how to instruct  to not roll with max pressure to prevent extensive soreness. SIJ L ASIS higher, leg pull on L  STM to L ITB    8/31 deferred due to time spent re-instructing on HEP    8/26 Big leg length difference    MET       Manual HS and piriformis, glut max stretches               STM L medial knee (just distal to the joint line, gastroc insertion/pes anserine area) x10'          OTHER:  9/23 re-assess for PT POC. D/w pt at length re progress, goals, prognosis. Review and progress HEP      Modalities:  9/30 trial ice bag to go for mild knee medial knee pain L - pt advised to expect ice to feel cold, then burny/hurty, then numb - use as alix to help reduce knee pain      Pt. Education:  9/30 review and progress HEP. Review SB ex seated for HEP- pt declines reviewing seated SB ex bc she states she is doing well with them at home. Pt educated to try stretches when  Her pain flares up to help it ease    9/28: Patient educated on progression of PT treatment. Patient educated on balance and how this influences her LE strength and her ability to stand for increased periods of time. Patient understands that she is not to attempt balance exercises at home at this time for her own personal safety.  Patient understands that balance exercises are only to be attempted in the II bars within therapy session with SBA and CGA.     9/21:Patient expressed desire to purchase SB, educated patient on retailers and types of balls that she could purchase. Patient will bring in ball in future session to be blown up with air pump. Pt educated re she will need UE support in order to do SB ex at home    9/16: Encouraged pt to continue recording how long she is standing per day. Encouraged patient to purchase a rubber mat to stand on (from [employer]/store of her choice) to help her increase her standing time in the kitchen. Also encouraged pt to wear house shoes to provide more support and she stands in her kitchen. 9/14: Reviewed HEP and the importance of using the massage roller with gentle force. Encouraged patient to continue with HEP and continue to journal how long patient is able to tolerate standing. Also emphasized the importance of continuing to use lumbar support towel roll and weight shifting when standing. 9/9: reviewed HEP. Educated patient on the importance of  feet and using a wide base of support to reduce fear and risk of falling. Patient was educated to record the amount of time she stands per day to estimate what her personal limits are with standing  while completing ADL's and IADL's. Patient has agreed to do this and have her children help her record the amount of time she is on her feet each day. 9/7: reviewed HEP, patient was able to take notes in [place] in addition to drawing pictures on her HEP printouts so she is able to easily perform them at home.   -8/31 review HEP - multiple cues for correct performance. Issued new handout with TrA iso and all ex. Re-explained all ex so that  pt could write notes in [place] in order  to remember how to do it correctly  8/19 patient and her son  Educated extensively on diagnosis, prognosis and expectations for rehab.  Instructed pt on approp ex /activity intensity.   -all patient questions were answered    Home Exercise Program:   9/30 Sit to /from stand, seated SB wt shifts a/p, m/l, cw/ccw    9/21 Access Code: [email or web address]  URL: [email or web address]  Date: 09/21/2021  Prepared by: Karen Esteban    Exercises  Supine Straight Leg Raises - 1 x daily - 7 x weekly - 2 sets - 4 reps - 20 hold    9/16 anti-fatigue mat for kitchen; supportive house shoes   9/14: Patient continues to perform HEP and notes that she enjoys her newly prescribed exercises and notes that they are helping her to reduce her pain. Access Code: [email or web address]  URL: [email or web address]/  Date: 09/14/2021  Prepared by: Karen Esteban    Exercises  Prone Press Up - 2 x daily - 7 x weekly - 1-2 sets - 10 reps - 3-5 sec hold  Supine Lower Trunk Rotation - 1 x daily - 7 x weekly - 1 sets - 10 reps - 5 hold  Supine Bridge with Pelvic Floor Contraction - 1 x daily - 7 x weekly - 3 sets - 10 reps      9/9 Patient continues to perform HEP and will record the amount of time she stands per to in order to learn what her standing limit is per day. 9/7 pt instructed in pelvic neutral and putting one foot on inside of cabinet to reduce pain while standing in kitchen to cook and wash dishes. Upright sitting posture with lumbar roll and feet on folded pillow (used as stool)  Access Code: [email or web address]  URL: [employer]/  Date: 08/31/2021  Prepared by: Karen Esteban    Exercises  Supine Transversus Abdominis Bracing - Hands on Stomach - 2 x daily - 7 x weekly - 1 sets - 10 reps - 5 hold  Supine Piriformis Stretch with Foot on Ground - 2 x daily - 7 x weekly - 1 sets - 3 reps - 30 hold  Hooklying Single Knee to Chest Stretch - 2 x daily - 7 x weekly - 1 sets - 3 reps - 30 hold  Supine Hamstring Stretch with Strap - 1 x daily - 7 x weekly - 3 sets - 10 reps    Access Code: [email or web address]   URL: [employer]/  Date: 08/19/2021  Prepared by: Karen Esteban    Exercises  Supine Posterior Pelvic Tilt - 2 x daily - 7 x weekly - 1-2 sets - 10 reps - 5 hold  Supine Hamstring Stretch - 2 x daily - 7 x weekly - 1 sets - 3-5 reps - 20-30 hold  Hooklying Single Knee to Chest Stretch - 2 x daily - 7 x weekly - 1 sets - 3 reps - 30 hold  Supine Piriformis Stretch with Foot on Ground - 2 x daily - 7 x weekly - 1 sets - 3 reps - 30 hold      Therapeutic Exercise and NMR EXR  [x] (56813) Provided verbal/tactile cueing for activities related to strengthening, flexibility, endurance, ROM for improvements in  [x] LE / Lumbar: LE, proximal hip, and core control with self care, mobility, lifting, ambulation. [] UE / Cervical: cervical, postural, scapular, scapulothoracic and UE control with self care, reaching, carrying, lifting, house/yardwork, driving, computer work.  [] (56003) Provided verbal/tactile cueing for activities related to improving balance, coordination, kinesthetic sense, posture, motor skill, proprioception to assist with   [] LE / lumbar: LE, proximal hip, and core control in self care, mobility, lifting, ambulation and eccentric single leg control. [] UE / cervical: cervical, scapular, scapulothoracic and UE control with self care, reaching, carrying, lifting, house/yardwork, driving, computer work.   [] (49781) Therapist is in constant attendance of 2 or more patients providing skilled therapy interventions, but not providing any significant amount of measurable one-on-one time to either patient, for improvements in  [] LE / lumbar: LE, proximal hip, and core control in self care, mobility, lifting, ambulation and eccentric single leg control. [] UE / cervical: cervical, scapular, scapulothoracic and UE control with self care, reaching, carrying, lifting, house/yardwork, driving, computer work.      NMR and Therapeutic Activities:    [x] (66280 or 81963) Provided verbal/tactile cueing for activities related to improving balance, coordination, kinesthetic sense, posture, motor skill, proprioception and motor activation to allow for proper function of   [x] LE: / Lumbar core, proximal hip and LE with self care and ADLs  [] UE / Cervical: cervical, postural, scapular, scapulothoracic and UE control with self care, carrying, lifting, driving, computer work.   [] (02894) Gait Re-education- Provided training and instruction to the patient for proper LE, core and proximal hip recruitment and positioning and eccentric body weight control with ambulation re-education including up and down stairs     Home Management Training / Self Care:  [] (60185) Provided self-care/home management training related to activities of daily living and compensatory training, and/or use of adaptive equipment for improvement with: ADLs and compensatory training, meal preparation, safety procedures and instruction in use of adaptive equipment, including bathing, grooming, dressing, personal hygiene, basic household cleaning and chores.      Home Exercise Program:    [x] (79127) Reviewed/Progressed HEP activities related to strengthening, flexibility, endurance, ROM of   [] LE / Lumbar: core, proximal hip and LE for functional self-care, mobility, lifting and ambulation/stair navigation   [] UE / Cervical: cervical, postural, scapular, scapulothoracic and UE control with self care, reaching, carrying, lifting, house/yardwork, driving, computer work  [] (09848)Reviewed/Progressed HEP activities related to improving balance, coordination, kinesthetic sense, posture, motor skill, proprioception of   [] LE: core, proximal hip and LE for self care, mobility, lifting, and ambulation/stair navigation    [] UE / Cervical: cervical, postural,  scapular, scapulothoracic and UE control with self care, reaching, carrying, lifting, house/yardwork, driving, computer work    Manual Treatments:  PROM / STM / Oscillations-Mobs:  G-I, II, III, IV (PA's, Inf., Post.)  [x] (82621) Provided manual therapy to mobilize LE, proximal hip and/or LS spine soft tissue/joints for the purpose of modulating pain, promoting relaxation,  increasing ROM, reducing/eliminating soft tissue swelling/inflammation/restriction, improving soft tissue extensibility and allowing for proper ROM for normal function with   [x] LE / lumbar: self care, mobility, lifting and ambulation. [] UE / Cervical: self care, reaching, carrying, lifting, house/yardwork, driving, computer work. Modalities:  [] (52765) Vasopneumatic compression: Utilized vasopneumatic compression to decrease edema / swelling for the purpose of improving mobility and quad tone / recruitment which will allow for increased overall function including but not limited to self-care, transfers, ambulation, and ascending / descending stairs. Charges:  Timed Code Treatment Minutes: 45   Total Treatment Minutes: 45     [] EVAL - LOW (15355)   [] EVAL - MOD (54640)  [] EVAL - HIGH (29571)  [] RE-EVAL (78859)  [x] EI(20151) x  1    [] Ionto  [] NMR (05741) x 1     [] Vaso  [x] Manual (28406) x    1   [] Ultrasound  [x] TA x   1    [] Mech Traction (28207)  [] Aquatic Therapy x     [] ES (un) (10855):   [] Home Management Training x 2  [] ES(attended) (23903)   [] Dry Needling 1-2 muscles (30421):  [] Dry Needling 3+ muscles (948814)  [] Group:      [] Other:     GOALS:  Patient stated goal: less pain  [x]? Progressing: []? Met: []? Not Met: []? Adjusted     Therapist goals for Patient:   Short Term Goals: To be achieved in: 2 weeks  1. Independent in HEP and progression per patient tolerance, in order to prevent re-injury. [x]? Progressing: []? Met: []? Not Met: []? Adjusted  2. Patient will have a decrease in pain to facilitate improvement in movement, function, and ADLs as indicated by improvement with respect to Functional Deficits. [x]? Progressing: []? Met: []? Not Met: []? Adjusted     Long Term Goals: To be achieved in:6 weeks  1.  Disability index score of 20 % or less on the   to assist with reaching prior level of function. [x]? Progressing: []? Met: []? Not Met: []? Adjusted  2. Patient will demonstrate increased AROM  to [facility], to allow for proper joint functioning to allow pt to resume ADLS/IADLS, home chores, lifting with good posture and body mechs all  without increase in symptoms. [x]? Progressing: []? Met: []? Not Met: []? Adjusted  3. Patient will demonstrate increased Strength by 1/2 mmt grade  and core activation to allow for proper functional mobility as indicated by patients Functional Deficits to allow pt to resume walking >/=  30-60 min and prn for shopping, going out in Community without increase in symptoms. [x]? Progressing: []? Met: []? Not Met: []? Adjusted  4. 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 [x]? Progressing: []? Met: []? Not Met: []? Adjusted     Overall Progression Towards Functional goals/ Treatment Progress Update:  [x] Patient is progressing as expected towards functional goals listed. [] Progression is slowed due to complexities/Impairments listed. [] Progression has been slowed due to co-morbidities. [] Plan just implemented, too soon to assess goals progression <30days   [] Goals require adjustment due to lack of progress  [] Patient is not progressing as expected and requires additional follow up with physician  [] Other    Persisting Functional Limitations/Impairments:  [x]Sleeping [x]Sitting               [x]Standing [x]Transfers        [x]Walking []Kneeling               [x]Stairs [x]Squatting / bending   [x]ADLs [x]Reaching  [x]Lifting  [x]Housework  []Driving []Job related tasks  []Sports/Recreation []Other:        ASSESSMENT:  10/12: Pt back pain appears managed with HEP and prior PT. Medial knee pain does not appear related to back pain as massage and gastroc stretching eliminated pain. Pt may benefit from further assessment in this area. 0/10 pain leaving session, requested pt report back on how long the pain relief lasts post session. 10/8: Pt reports no change in symptoms post session. Progressed balance and TA iso there-ex. Will continue to progress as able. 10/5: Pt reports pain mostly in knee vs. Back however both back and knee pain resolved at end of session, reports R lateral thigh pain post session. 9/30 increasing fxnl alix and increasing alix of HEP/ex at clinic. Improving balance. SB cont to help with pain. 9/28: Patient has shown improvement in posture and LE strength with reduction in low back pain and bilat knee pain. Dependence upon visual system to orient body in space demonstrated in II bars with balance exercises when EC. Will continue to progress balance treatment and PT intervention to increase motor programming/planning to vestibular system. Patient to continue to participate in skilled PT to improve balance, low back and biat knee pain with LE strength per POC. Patient still requires VC and TC during exercise program.     9/23 increased fxnl alix, strength and reduced pain  pt benefits from improved postures, body mechs and from pacing herself with [facility] home chores/cooking activities. 9/21: Patient is progressing well with gym routine and HEP. Patient very compliant with HEP and understands how it can help her reduce her pain and improve symptoms during ADLs and IADLs. Patient still requires VC and TC with some exercises. Patient continues to document how long she is standing per day with the help of her children and . Patient experiences a great amount of relief with SB exercises. Patient expressed desire to purchase SB, educated patient on retailers and types of balls that she could purchase. Patient will bring in ball in future session to be blown up with air pump. Progress note to be done at next session with balance assessment.      9/16 Patient able to tolerate exercise well with minimal increase in pain. Patient is very compliant with HEP and notes that it is helping her with her pain control. Patient continues to make gains with core control, but requires VC and TC to maintain during some exercises. Patient will continue to record how long she stands per day, as well as purchase a rubber support mat for her kitchen so she is able to stand for longer periods of time in her kitchen. 9/14: Patient able to tolerate gym and mat exercises today due to decreased pain. Patient continues to make core stabilization gains. With the help of her  and children, patient is able to journal how long she stands per day in addition to how easy/hard it is for her to perform her ADL's and IADL's. Patient recently purchased a massage roller off of [facility] and her  has been using the roller on her low back. Patient's  used too much force with roller and patient reports that she was in excessive pain from last Wednesday through today. Appropriate rolling technique was demonstrated and patient has an increased understanding of appropriate force and use of massage roller. 9/9: Patient unable to tolerate full gym program today due to increase in pain. Patient continues to make core stabilization gains and utilize appropriate body mechanics to minimize fear and risk of falling while also striving for increased standing time at home in order to complete ADL's and IADL's. Patient expressed concern over numbness and tingling in R UE. Patient will be visiting her PCP and wants to discuss the option of receiving occupational therapy to help with her R hand and UE deficits/pain. 9/7: Improved ex tolerance and decreased pain in legs, pain has centralized to low back. Patient continues to make core-strength stabilization gains and desired to work towards standing and sitting for longer periods of time. 9/2 improved ex alix and decreased pain.   STM to myofascial pain after tx helps with pain management  8/31 Pt cont to have difficulty doing HEP correctly. Spent extra time re-instructing in how to do HEP correctly - had pt write notes in [place] on HEP handout    Treatment/Activity Tolerance:  [x] Patient able to complete tx [] Patient limited by fatigue  [] Patient limited by pain  [] Patient limited by other medical complications  [] Other:     Prognosis: [x] Good [] Fair  [] Poor    Patient Requires Follow-up: [x] Yes  [] No    PLAN:  strength, ROM/flexibility, posture and body mechs, manual, MOC, HEP, pt education; consider pt bringing family member for education on supporting her with HEP/appropriate activity levels     Frequency/Duration:   2 days per week for 6 Weeks:  Interventions:  [x]? Therapeutic exercise including:strength, ROM, flexibility  [x]? NMR activation and proprioception including postural re-education  [x]? Manual therapy as indicated to include: IASTM, STM, PROM, Gr I-IV mobilizations, manipulation. [x]? Modalities as needed that may include: thermal agents, E-stim, Biofeedback, US, iontophoresis as indicated  [x]? Patient education on joint protection, postural re-education, activity modification, progression of HEP. AQUATIC THERAPY     Plan for next treatment session: start with emphasis on mat table ex, manual, heat and progress to more fxnl/CKC activities as alix    PLAN: See eval. PT 2x / week for 6  weeks. [x] Continue per plan of care [] Alter current plan (see comments)  [] Plan of care initiated [] Hold pending MD visit [] Discharge    Electronically signed by: Janie Kaiser, PT, DPT          Note: If patient does not return for scheduled/ recommended follow up visits, this note will serve as a discharge from care along with most recent update on progress.

## 2021-10-14 ENCOUNTER — HOSPITAL ENCOUNTER (OUTPATIENT)
Dept: PHYSICAL THERAPY | Age: 56
Setting detail: THERAPIES SERIES
Discharge: HOME | End: 2021-10-14
Payer: COMMERCIAL

## 2021-10-14 PROCEDURE — 97140 MANUAL THERAPY 1/> REGIONS: CPT

## 2021-10-14 PROCEDURE — 97112 NEUROMUSCULAR REEDUCATION: CPT

## 2021-10-14 PROCEDURE — 97110 THERAPEUTIC EXERCISES: CPT

## 2021-10-14 NOTE — FLOWSHEET NOTE
10/5 Pt reports that her back is feeling much better, more concerned with R medial knee pain today. 9/30 no  LBP today, but had pain up and down her R side yesterday. Walked for 30 min yesterday to her sister's house    9/28: Patient reports no knee pain today. Patient states that she is able to  the kitchen for longer periods of time without pain, aprox 15-17 min. Patient notes decrease in back pain today as well. Patient is also using SB that she purchased and she has noted improvement with her low back pain. 9/23 still has trouble cooking due to pain so her family helps her. No knee pain today. Able to stand 15 - 18 min when cooking. OBJECTIVE: 10/14, 10/12, 10/8, 10/5, 9/30, 9/28, 9/23, 9/21, 9/16, 9/14, 9/9 9/7, 9/2, 8/31, 8/26,  8/19 used Ipad interpretor for Maru Johnson  9/28: Imbalance with EC and SLS noted in II bars- CGA  9/2 palpate; tender L medial tibial condyle, L medial HS.   8/31 difficulty with TrA iso  8/19 Pt's son present for PT eval and tx. See eval      RESTRICTIONS/PRECAUTIONS: hx of covid 1 yr ago     Exercises/Interventions: PREFERS MAT EX for HEP BC THEY HELP WITH PAIN.  Per pt, low alix of standing ex, especially  for HEP    Therapeutic Exercises (24070) Resistance / level Sets Reps Notes   Nu step      7.5 min L1 sci-fit S=15, arms =7      bike   IB   HR/TR 3x30\"    2   10    HS Step stretch  Hip flex stretch  2x30\" B   2x30\" B      HS curl       TG B squat with Tr A iso  2x10  squats to approx 40-45 deg  9/30 deferred today  -bc  added sit to/from stand practice with TrA iso for HEP   Standing hip 3-way       Stabs  TrA iso  TrA iso + marching  Mat exercises  SKTC  Fig 4 pirformis stretch in sitting  Supine piriformis stretch   Supine HS stretch   Supine trunk rotation  3x 30\"  R     Pelvic bridge with TA       Prone press up             SLR       Therapeutic Activities (97172)       See PT education section below       Sit to /from stand  20x with TrA iso          9/21 patient to purchase exercise ball to help with low back pain, may kneed to blow up ball at next session for patient. TA        Instruct in sleep with neutral spine and approp pillow support for neutral spine          Sitting posture- instructed in pelvic neutral positioning. Standing at counter                 Neuromuscular Re-ed (94927)       Seated SB   Wt shifts x 6: a/p, cw/ccw  R/L     . Supine SB ex      Balance exercises in II bars   SLS    SLS Airex      airex  Standing hip 3-way  Partial tandem Eo/EC  NBOS  EC  Full tandem Eo/EC      2 x 30\" CGA B intermittent Finger contact. 10x ea R/ L light fingertips  SBA     9/28: Progress to airex on future visit when pt can tolerate  Slight imbalance noted with SLS and EC. Manual Intervention (77744)       Roller to B lower quarter, manual stretching to hamstrings, rectus femoris, and piriformis. 9/14: Educated patient on how to instruct  to not roll with max pressure to prevent extensive soreness. SIJ L ASIS higher, leg pull on L  STM to L ITB    8/31 deferred due to time spent re-instructing on HEP    8/26 Big leg length difference    MET       Manual HS and piriformis, glut max stretches               STM L medial knee (just distal to the joint line, gastroc insertion/pes anserine area) x9'          OTHER:  9/23 re-assess for PT POC. D/w pt at length re progress, goals, prognosis. Review and progress HEP      Modalities:  10/14 MHP x10 min to L LE and knee  9/30 trial ice bag to go for mild knee medial knee pain L - pt advised to expect ice to feel cold, then burny/hurty, then numb - use as alix to help reduce knee pain      Pt. Education:  9/30 review and progress HEP. Review SB ex seated for HEP- pt declines reviewing seated SB ex bc she states she is doing well with them at home.  Pt educated to try stretches when  Her pain flares up to help it ease    9/28: Patient educated on progression of PT treatment. Patient educated on balance and how this influences her LE strength and her ability to stand for increased periods of time. Patient understands that she is not to attempt balance exercises at home at this time for her own personal safety. Patient understands that balance exercises are only to be attempted in the II bars within therapy session with SBA and CGA.     9/21:Patient expressed desire to purchase SB, educated patient on retailers and types of balls that she could purchase. Patient will bring in ball in future session to be blown up with air pump. Pt educated re she will need UE support in order to do SB ex at home    9/16: Encouraged pt to continue recording how long she is standing per day. Encouraged patient to purchase a rubber mat to stand on (from [employer]/store of her choice) to help her increase her standing time in the kitchen. Also encouraged pt to wear house shoes to provide more support and she stands in her kitchen. 9/14: Reviewed HEP and the importance of using the massage roller with gentle force. Encouraged patient to continue with HEP and continue to journal how long patient is able to tolerate standing. Also emphasized the importance of continuing to use lumbar support towel roll and weight shifting when standing. 9/9: reviewed HEP. Educated patient on the importance of  feet and using a wide base of support to reduce fear and risk of falling. Patient was educated to record the amount of time she stands per day to estimate what her personal limits are with standing  while completing ADL's and IADL's. Patient has agreed to do this and have her children help her record the amount of time she is on her feet each day. 9/7: reviewed HEP, patient was able to take notes in [place] in addition to drawing pictures on her HEP printouts so she is able to easily perform them at home.   -8/31 review HEP - multiple cues for correct performance.   Issued new handout with Errol redmond and all ex. Re-explained all ex so that  pt could write notes in [place] in order  to remember how to do it correctly  8/19 patient and her son  Educated extensively on diagnosis, prognosis and expectations for rehab. Instructed pt on approp ex /activity intensity.   -all patient questions were answered    Home Exercise Program:   9/30 Sit to /from stand, seated SB wt shifts a/p, m/l, cw/ccw    9/21 Access Code: [email or web address]  URL: [email or web address]  Date: 09/21/2021  Prepared by: Diane Genre    Exercises  Supine Straight Leg Raises - 1 x daily - 7 x weekly - 2 sets - 4 reps - 20 hold    9/16 anti-fatigue mat for kitchen; supportive house shoes   9/14: Patient continues to perform HEP and notes that she enjoys her newly prescribed exercises and notes that they are helping her to reduce her pain. Access Code: [email or web address]  URL: [email or web address]  Date: 09/14/2021  Prepared by: Diane Genre    Exercises  Prone Press Up - 2 x daily - 7 x weekly - 1-2 sets - 10 reps - 3-5 sec hold  Supine Lower Trunk Rotation - 1 x daily - 7 x weekly - 1 sets - 10 reps - 5 hold  Supine Bridge with Pelvic Floor Contraction - 1 x daily - 7 x weekly - 3 sets - 10 reps      9/9 Patient continues to perform HEP and will record the amount of time she stands per to in order to learn what her standing limit is per day. 9/7 pt instructed in pelvic neutral and putting one foot on inside of cabinet to reduce pain while standing in kitchen to cook and wash dishes. Upright sitting posture with lumbar roll and feet on folded pillow (used as stool)  Access Code: [email or web address]  URL: [email or web address]  Date: 08/31/2021  Prepared by: Diane Genre    Exercises  Supine Transversus Abdominis Bracing - Hands on Stomach - 2 x daily - 7 x weekly - 1 sets - 10 reps - 5 hold  Supine Piriformis Stretch with Foot on Ground - 2 x daily - 7 x weekly - 1 sets - 3 reps - 30 hold  Hooklying Single Knee to Chest Stretch - 2 x daily - 7 x weekly - 1 sets - 3 reps - 30 hold  Supine Hamstring Stretch with Strap - 1 x daily - 7 x weekly - 3 sets - 10 reps    Access Code: [email or web address]   URL: [email or web address]  Date: 08/19/2021  Prepared by: Joann Mejia    Exercises  Supine Posterior Pelvic Tilt - 2 x daily - 7 x weekly - 1-2 sets - 10 reps - 5 hold  Supine Hamstring Stretch - 2 x daily - 7 x weekly - 1 sets - 3-5 reps - 20-30 hold  Hooklying Single Knee to Chest Stretch - 2 x daily - 7 x weekly - 1 sets - 3 reps - 30 hold  Supine Piriformis Stretch with Foot on Ground - 2 x daily - 7 x weekly - 1 sets - 3 reps - 30 hold      Therapeutic Exercise and NMR EXR  [x] (54701) Provided verbal/tactile cueing for activities related to strengthening, flexibility, endurance, ROM for improvements in  [x] LE / Lumbar: LE, proximal hip, and core control with self care, mobility, lifting, ambulation. [] UE / Cervical: cervical, postural, scapular, scapulothoracic and UE control with self care, reaching, carrying, lifting, house/yardwork, driving, computer work.  [] (03905) Provided verbal/tactile cueing for activities related to improving balance, coordination, kinesthetic sense, posture, motor skill, proprioception to assist with   [] LE / lumbar: LE, proximal hip, and core control in self care, mobility, lifting, ambulation and eccentric single leg control. [] UE / cervical: cervical, scapular, scapulothoracic and UE control with self care, reaching, carrying, lifting, house/yardwork, driving, computer work.   [] (12006) Therapist is in constant attendance of 2 or more patients providing skilled therapy interventions, but not providing any significant amount of measurable one-on-one time to either patient, for improvements in  [] LE / lumbar: LE, proximal hip, and core control in self care, mobility, lifting, ambulation and eccentric single leg control.    [] UE / cervical: cervical, scapular, scapulothoracic and UE control with self care, reaching, carrying, lifting, house/yardwork, driving, computer work. NMR and Therapeutic Activities:    [x] (65371 or 69136) Provided verbal/tactile cueing for activities related to improving balance, coordination, kinesthetic sense, posture, motor skill, proprioception and motor activation to allow for proper function of   [x] LE: / Lumbar core, proximal hip and LE with self care and ADLs  [] UE / Cervical: cervical, postural, scapular, scapulothoracic and UE control with self care, carrying, lifting, driving, computer work.   [] (66330) Gait Re-education- Provided training and instruction to the patient for proper LE, core and proximal hip recruitment and positioning and eccentric body weight control with ambulation re-education including up and down stairs     Home Management Training / Self Care:  [] (33686) Provided self-care/home management training related to activities of daily living and compensatory training, and/or use of adaptive equipment for improvement with: ADLs and compensatory training, meal preparation, safety procedures and instruction in use of adaptive equipment, including bathing, grooming, dressing, personal hygiene, basic household cleaning and chores.      Home Exercise Program:    [x] (06771) Reviewed/Progressed HEP activities related to strengthening, flexibility, endurance, ROM of   [] LE / Lumbar: core, proximal hip and LE for functional self-care, mobility, lifting and ambulation/stair navigation   [] UE / Cervical: cervical, postural, scapular, scapulothoracic and UE control with self care, reaching, carrying, lifting, house/yardwork, driving, computer work  [] (95012)Reviewed/Progressed HEP activities related to improving balance, coordination, kinesthetic sense, posture, motor skill, proprioception of   [] LE: core, proximal hip and LE for self care, mobility, lifting, and ambulation/stair navigation    [] UE / Cervical: cervical, postural,  scapular, scapulothoracic and UE control with self care, reaching, carrying, lifting, house/yardwork, driving, computer work    Manual Treatments:  PROM / STM / Oscillations-Mobs:  G-I, II, III, IV (PA's, Inf., Post.)  [x] (59869) Provided manual therapy to mobilize LE, proximal hip and/or LS spine soft tissue/joints for the purpose of modulating pain, promoting relaxation,  increasing ROM, reducing/eliminating soft tissue swelling/inflammation/restriction, improving soft tissue extensibility and allowing for proper ROM for normal function with   [x] LE / lumbar: self care, mobility, lifting and ambulation. [] UE / Cervical: self care, reaching, carrying, lifting, house/yardwork, driving, computer work. Modalities:  [] (05870) Vasopneumatic compression: Utilized vasopneumatic compression to decrease edema / swelling for the purpose of improving mobility and quad tone / recruitment which will allow for increased overall function including but not limited to self-care, transfers, ambulation, and ascending / descending stairs. Charges:  Timed Code Treatment Minutes: 44   Total Treatment Minutes: 44     [] EVAL - LOW (98708)   [] EVAL - MOD (79525)  [] EVAL - HIGH (98403)  [] RE-EVAL (59987)  [x] XN(24541) x  1    [] Ionto  [x] NMR (70605) x 1     [] Vaso  [x] Manual (85633) x    1   [] Ultrasound  [] TA x   1    [] Mech Traction (35945)  [] Aquatic Therapy x     [] ES (un) (59419):   [] Home Management Training x 2  [] ES(attended) (95228)   [] Dry Needling 1-2 muscles (25984):  [] Dry Needling 3+ muscles (037300)  [] Group:      [] Other:     GOALS:  Patient stated goal: less pain  [x]? Progressing: []? Met: []? Not Met: []? Adjusted     Therapist goals for Patient:   Short Term Goals: To be achieved in: 2 weeks  1. Independent in HEP and progression per patient tolerance, in order to prevent re-injury. [x]? Progressing: []? Met: []? Not Met: []? Adjusted  2.  Patient will have a decrease in pain to facilitate improvement in movement, function, and ADLs as indicated by improvement with respect to Functional Deficits. [x]? Progressing: []? Met: []? Not Met: []? Adjusted     Long Term Goals: To be achieved in:6 weeks  1. Disability index score of 20 % or less on the   to assist with reaching prior level of function. [x]? Progressing: []? Met: []? Not Met: []? Adjusted  2. Patient will demonstrate increased AROM  to [facility], to allow for proper joint functioning to allow pt to resume ADLS/IADLS, home chores, lifting with good posture and body mechs all  without increase in symptoms. [x]? Progressing: []? Met: []? Not Met: []? Adjusted  3. Patient will demonstrate increased Strength by 1/2 mmt grade  and core activation to allow for proper functional mobility as indicated by patients Functional Deficits to allow pt to resume walking >/=  30-60 min and prn for shopping, going out in [place] without increase in symptoms. [x]? Progressing: []? Met: []? Not Met: []? Adjusted  4. 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 [x]? Progressing: []? Met: []? Not Met: []? Adjusted     Overall Progression Towards Functional goals/ Treatment Progress Update:  [x] Patient is progressing as expected towards functional goals listed. [] Progression is slowed due to complexities/Impairments listed. [] Progression has been slowed due to co-morbidities.   [] Plan just implemented, too soon to assess goals progression <30days   [] Goals require adjustment due to lack of progress  [] Patient is not progressing as expected and requires additional follow up with physician  [] Other    Persisting Functional Limitations/Impairments:  [x]Sleeping [x]Sitting               [x]Standing [x]Transfers        [x]Walking []Kneeling               [x]Stairs [x]Squatting / bending   [x]ADLs [x]Reaching  [x]Lifting  [x]Housework  []Driving []Job related tasks  []Sports/Recreation []Other:        ASSESSMENT:  10/14 pt cont to need increased LE and trunk strength to increase fxnl alix of cooking   10/12: Pt back pain appears managed with HEP and prior PT. Medial knee pain does not appear related to back pain as massage and gastroc stretching eliminated pain. Pt may benefit from further assessment in this area. 0/10 pain leaving session, requested pt report back on how long the pain relief lasts post session. 10/8: Pt reports no change in symptoms post session. Progressed balance and TA iso there-ex. Will continue to progress as able. 10/5: Pt reports pain mostly in knee vs. Back however both back and knee pain resolved at end of session, reports R lateral thigh pain post session. 9/30 increasing fxnl alix and increasing alix of HEP/ex at clinic. Improving balance. SB cont to help with pain. 9/28: Patient has shown improvement in posture and LE strength with reduction in low back pain and bilat knee pain. Dependence upon visual system to orient body in space demonstrated in II bars with balance exercises when EC. Will continue to progress balance treatment and PT intervention to increase motor programming/planning to vestibular system. Patient to continue to participate in skilled PT to improve balance, low back and biat knee pain with LE strength per POC. Patient still requires VC and TC during exercise program.     9/23 increased fxnl alix, strength and reduced pain  pt benefits from improved postures, body mechs and from pacing herself with [facility] home chores/cooking activities. 9/21: Patient is progressing well with gym routine and HEP. Patient very compliant with HEP and understands how it can help her reduce her pain and improve symptoms during ADLs and IADLs. Patient still requires VC and TC with some exercises. Patient continues to document how long she is standing per day with the help of her children and .  Patient experiences a great amount of relief with SB exercises. Patient expressed desire to purchase SB, educated patient on retailers and types of balls that she could purchase. Patient will bring in ball in future session to be blown up with air pump. Progress note to be done at next session with balance assessment. 9/16 Patient able to tolerate exercise well with minimal increase in pain. Patient is very compliant with HEP and notes that it is helping her with her pain control. Patient continues to make gains with core control, but requires VC and TC to maintain during some exercises. Patient will continue to record how long she stands per day, as well as purchase a rubber support mat for her kitchen so she is able to stand for longer periods of time in her kitchen. 9/14: Patient able to tolerate gym and mat exercises today due to decreased pain. Patient continues to make core stabilization gains. With the help of her  and children, patient is able to journal how long she stands per day in addition to how easy/hard it is for her to perform her ADL's and IADL's. Patient recently purchased a massage roller off of [employer] and her  has been using the roller on her low back. Patient's  used too much force with roller and patient reports that she was in excessive pain from last Wednesday through today. Appropriate rolling technique was demonstrated and patient has an increased understanding of appropriate force and use of massage roller. 9/9: Patient unable to tolerate full gym program today due to increase in pain. Patient continues to make core stabilization gains and utilize appropriate body mechanics to minimize fear and risk of falling while also striving for increased standing time at home in order to complete ADL's and IADL's. Patient expressed concern over numbness and tingling in R UE.  Patient will be visiting her PCP and wants to discuss the option of receiving occupational therapy to help with her R hand and UE deficits/pain. 9/7: Improved ex tolerance and decreased pain in legs, pain has centralized to low back. Patient continues to make core-strength stabilization gains and desired to work towards standing and sitting for longer periods of time. 9/2 improved ex alix and decreased pain. STM to myofascial pain after tx helps with pain management  8/31 Pt cont to have difficulty doing HEP correctly. Spent extra time re-instructing in how to do HEP correctly - had pt write notes in [place] on HEP handout    Treatment/Activity Tolerance:  [x] Patient able to complete tx [] Patient limited by fatigue  [] Patient limited by pain  [] Patient limited by other medical complications  [] Other:     Prognosis: [x] Good [] Fair  [] Poor    Patient Requires Follow-up: [x] Yes  [] No    PLAN:  strength, ROM/flexibility, posture and body mechs, manual, MOC, HEP, pt education; consider pt bringing family member for education on supporting her with HEP/appropriate activity levels     Frequency/Duration:   2 days per week for 6 Weeks:  Interventions:  [x]? Therapeutic exercise including:strength, ROM, flexibility  [x]? NMR activation and proprioception including postural re-education  [x]? Manual therapy as indicated to include: IASTM, STM, PROM, Gr I-IV mobilizations, manipulation. [x]? Modalities as needed that may include: thermal agents, E-stim, Biofeedback, US, iontophoresis as indicated  [x]? Patient education on joint protection, postural re-education, activity modification, progression of HEP. AQUATIC THERAPY     Plan for next treatment session: start with emphasis on mat table ex, manual, heat and progress to more fxnl/CKC activities as alix    PLAN: See eval. PT 2x / week for 6  weeks.    [x] Continue per plan of care [] Alter current plan (see comments)  [] Plan of care initiated [] Hold pending MD visit [] Discharge    Electronically signed by: Dwight Alt, PT, DPT          Note: If patient does not return for scheduled/ recommended follow up visits, this note will serve as a discharge from care along with most recent update on progress.

## 2021-10-19 ENCOUNTER — HOSPITAL ENCOUNTER (OUTPATIENT)
Dept: PHYSICAL THERAPY | Age: 56
Setting detail: THERAPIES SERIES
Discharge: HOME | End: 2021-10-19
Payer: COMMERCIAL

## 2021-10-19 PROCEDURE — 97110 THERAPEUTIC EXERCISES: CPT

## 2021-10-19 PROCEDURE — 97112 NEUROMUSCULAR REEDUCATION: CPT

## 2021-10-19 PROCEDURE — 97140 MANUAL THERAPY 1/> REGIONS: CPT

## 2021-10-19 NOTE — FLOWSHEET NOTE
[facility] Physical Therapy  Phone: [phone]   Fax: [phone]  Physical Therapy Daily Treatment Note  Date:  10/19/2021    Patient Name:  Bryce Murray   (Hudson Morley) :  1965  MRN: [id number]  Medical/Treatment Diagnosis Information:  Diagnosis: Chronic bilateral low back pain without sciatica M54.5, G89.29  Treatment Diagnosis: poor habitual postures/body mechs, myofascial pain, strength and flexibility deficits contribute to pt's back and sciatica pain  Insurance/Certification information:  PT Insurance Information: [place] 30 visits soft limit. no auth  Physician Information:  Referring Practitioner: BARRY Stevenson  Plan of care signed (Y/N): [x]  Yes- MD signed POC  approving 8 additional visits for this POC [x]  No- MD has not yet signed initial POC, was faxed on 21     Date of Patient follow up with Physician:      Progress Report: []  Yes  [x]  No       Date Range for reporting period:  Beginnin/19  Ending:     Progress report due (10 Rx/or 30 days whichever is less): visit #74 or 349     Recertification due (POC duration/ or 90 days whichever is less): visit #12 or     Visit # Insurance Allowable Auth required? Date Range     +  30 []  Yes  [x]  No NA         Latex Allergy:  [x]NO      []YES  Preferred Language for Healthcare:   []English       [x]other: Japanese    Functional Scale:        Date assessed:  oswestry: raw score = 31, dysfunction = 62%, taken at initial eval    Pain level: 2-3/10/10  B LBP today, 2-3/10 L medial knee    SUBJECTIVE:  10/19 has a little bit of pain in low back and knee  10/14: feels ok today back is good, still has L knee pain. Doing home chores - cannot yet do all of them. Able to stand for about 20 min at time in kitchen    10/12: Pt reports that her back is not hurting her today, pain is mostly in the L knee region (anterior, slightly distal to the joint line).      10/8: Pt reports that she is having pain in her mach and her knee today. 10/5 Pt reports that her back is feeling much better, more concerned with R medial knee pain today. 9/30 no  LBP today, but had pain up and down her R side yesterday. Walked for 30 min yesterday to her sister's house    9/28: Patient reports no knee pain today. Patient states that she is able to  the kitchen for longer periods of time without pain, aprox 15-17 min. Patient notes decrease in back pain today as well. Patient is also using SB that she purchased and she has noted improvement with her low back pain. 9/23 still has trouble cooking due to pain so her family helps her. No knee pain today. Able to stand 15 - 18 min when cooking. OBJECTIVE: 10/19, 10/14, 10/12, 10/8, 10/5, 9/30, 9/28, 9/23, 9/21, 9/16, 9/14, 9/9 9/7, 9/2, 8/31, 8/26,  8/19 used Ipad interpretor for Lao  10/19 stand alix: 17-18 min, sit  alix 12-13 alix. assymetrical mole sup aspect L thigh ap[prx 1 cm diameter - pt advised to show PCP - she is agreeable  9/28: Imbalance with EC and SLS noted in II bars- CGA  9/2 palpate; tender L medial tibial condyle, L medial HS.   8/31 difficulty with TrA iso  8/19 Pt's son present for PT eval and tx. See eval      RESTRICTIONS/PRECAUTIONS: hx of covid 1 yr ago     Exercises/Interventions: PREFERS MAT EX for HEP BC THEY HELP WITH PAIN.  Per pt, low alix of standing ex, especially  for HEP    Therapeutic Exercises (85743) Resistance / level Sets Reps Notes   Nu step      7 min L1 sci-fit S=15, arms =7      bike   IB   HR/TR 3x30\"    2   10    HS Step stretch  Hip flex stretch  2x30\" B   2x30\" B      HS curl 10x ea R/L      TG B squat with Tr A iso  3x10  squats to approx 40-45 deg  9/30 deferred today  -bc  added sit to/from stand practice with TrA iso for HEP   Standing hip 3-way       Stabs  TrA iso  TrA iso + marching  Mat exercises  SKTC  Fig 4 pirformis stretch in sitting  Supine piriformis stretch   Supine HS stretch Supine trunk rotation  3x 30\"  R     Pelvic bridge with TA       Prone press up             SLR       Therapeutic Activities (52648)       See PT education section below       Sit to /from stand  10x with TrA iso          9/21 patient to purchase exercise ball to help with low back pain, may kneed to blow up ball at next session for patient. TA        Instruct in sleep with neutral spine and approp pillow support for neutral spine          Sitting posture- instructed in pelvic neutral positioning. Standing at counter                 Neuromuscular Re-ed (98856)       Seated SB   Wt shifts x 6: a/p, cw/ccw  R/L     . Supine SB ex      Balance exercises in II bars   SLS    SLS Airex      airex  Standing hip 3-way  Partial tandem Eo/EC  NBOS  EC  Full tandem Eo/EC      2 x 30\" CGA B intermittent Finger contact. 10x ea R/ L light fingertips  SBA     9/28: Progress to airex on future visit when pt can tolerate  Slight imbalance noted with SLS and EC. Manual Intervention (64586)       Roller to B lower quarter, manual stretching to hamstrings, rectus femoris, and piriformis. 9/14: Educated patient on how to instruct  to not roll with max pressure to prevent extensive soreness. SIJ L ASIS higher, leg pull on L  STM to L ITB    8/31 deferred due to time spent re-instructing on HEP    8/26 Big leg length difference    MET       Manual HS and piriformis, glut max stretches               STM L medial knee (just distal to the joint line, gastroc insertion/pes anserine area)  Trial ktape - light touch - I strip B pes anserine 25% stretch applied inf to sup x10'          OTHER:  9/23 re-assess for PT POC. D/w pt at length re progress, goals, prognosis.  Review and progress HEP      Modalities:  10/14 MHP x10 min to L LE and knee  9/30 trial ice bag to go for mild knee medial knee pain L - pt advised to expect ice to feel cold, then burny/hurty, then numb - use as alix to help reduce knee pain      Pt. Education:  10/19 review HEP, encourage seated SB ex for management of LBP\9/30 review and progress HEP. Review SB ex seated for HEP- pt declines reviewing seated SB ex bc she states she is doing well with them at home. Pt educated to try stretches when  Her pain flares up to help it ease    9/28: Patient educated on progression of PT treatment. Patient educated on balance and how this influences her LE strength and her ability to stand for increased periods of time. Patient understands that she is not to attempt balance exercises at home at this time for her own personal safety. Patient understands that balance exercises are only to be attempted in the II bars within therapy session with SBA and CGA.     9/21:Patient expressed desire to purchase SB, educated patient on retailers and types of balls that she could purchase. Patient will bring in ball in future session to be blown up with air pump. Pt educated re she will need UE support in order to do SB ex at home    9/16: Encouraged pt to continue recording how long she is standing per day. Encouraged patient to purchase a rubber mat to stand on (from [employer]/store of her choice) to help her increase her standing time in the kitchen. Also encouraged pt to wear house shoes to provide more support and she stands in her kitchen. 9/14: Reviewed HEP and the importance of using the massage roller with gentle force. Encouraged patient to continue with HEP and continue to journal how long patient is able to tolerate standing. Also emphasized the importance of continuing to use lumbar support towel roll and weight shifting when standing. 9/9: reviewed HEP. Educated patient on the importance of  feet and using a wide base of support to reduce fear and risk of falling.  Patient was educated to record the amount of time she stands per day to estimate what her personal limits are with standing  while completing ADL's and IADL's. Patient has agreed to do this and have her children help her record the amount of time she is on her feet each day. 9/7: reviewed HEP, patient was able to take notes in [place] in addition to drawing pictures on her HEP printouts so she is able to easily perform them at home.   -8/31 review HEP - multiple cues for correct performance. Issued new handout with TrA iso and all ex. Re-explained all ex so that  pt could write notes in [place] in order  to remember how to do it correctly  8/19 patient and her son  Educated extensively on diagnosis, prognosis and expectations for rehab. Instructed pt on approp ex /activity intensity.   -all patient questions were answered    Home Exercise Program:   9/30 Sit to /from stand, seated SB wt shifts a/p, m/l, cw/ccw    9/21 Access Code: [email or web address]  URL: [email or web address]  Date: 09/21/2021  Prepared by: Antonio Catching    Exercises  Supine Straight Leg Raises - 1 x daily - 7 x weekly - 2 sets - 4 reps - 20 hold    9/16 anti-fatigue mat for kitchen; supportive house shoes   9/14: Patient continues to perform HEP and notes that she enjoys her newly prescribed exercises and notes that they are helping her to reduce her pain. Access Code: [email or web address]  URL: [email or web address]  Date: 09/14/2021  Prepared by: Antonio Catching    Exercises  Prone Press Up - 2 x daily - 7 x weekly - 1-2 sets - 10 reps - 3-5 sec hold  Supine Lower Trunk Rotation - 1 x daily - 7 x weekly - 1 sets - 10 reps - 5 hold  Supine Bridge with Pelvic Floor Contraction - 1 x daily - 7 x weekly - 3 sets - 10 reps      9/9 Patient continues to perform HEP and will record the amount of time she stands per to in order to learn what her standing limit is per day. 9/7 pt instructed in pelvic neutral and putting one foot on inside of cabinet to reduce pain while standing in kitchen to cook and wash dishes.  Upright sitting posture with lumbar roll and feet on folded pillow (used as stool)  Access Code: [email or web address]  URL: [email or web address]  Date: 08/31/2021  Prepared by: Mathew Ore    Exercises  Supine Transversus Abdominis Bracing - Hands on Stomach - 2 x daily - 7 x weekly - 1 sets - 10 reps - 5 hold  Supine Piriformis Stretch with Foot on Ground - 2 x daily - 7 x weekly - 1 sets - 3 reps - 30 hold  Hooklying Single Knee to Chest Stretch - 2 x daily - 7 x weekly - 1 sets - 3 reps - 30 hold  Supine Hamstring Stretch with Strap - 1 x daily - 7 x weekly - 3 sets - 10 reps    Access Code: [email or web address]   URL: [email or web address]  Date: 08/19/2021  Prepared by: Mathew Ore    Exercises  Supine Posterior Pelvic Tilt - 2 x daily - 7 x weekly - 1-2 sets - 10 reps - 5 hold  Supine Hamstring Stretch - 2 x daily - 7 x weekly - 1 sets - 3-5 reps - 20-30 hold  Hooklying Single Knee to Chest Stretch - 2 x daily - 7 x weekly - 1 sets - 3 reps - 30 hold  Supine Piriformis Stretch with Foot on Ground - 2 x daily - 7 x weekly - 1 sets - 3 reps - 30 hold      Therapeutic Exercise and NMR EXR  [x] (49246) Provided verbal/tactile cueing for activities related to strengthening, flexibility, endurance, ROM for improvements in  [x] LE / Lumbar: LE, proximal hip, and core control with self care, mobility, lifting, ambulation. [] UE / Cervical: cervical, postural, scapular, scapulothoracic and UE control with self care, reaching, carrying, lifting, house/yardwork, driving, computer work.  [] (17629) Provided verbal/tactile cueing for activities related to improving balance, coordination, kinesthetic sense, posture, motor skill, proprioception to assist with   [] LE / lumbar: LE, proximal hip, and core control in self care, mobility, lifting, ambulation and eccentric single leg control.    [] UE / cervical: cervical, scapular, scapulothoracic and UE control with self care, reaching, carrying, lifting, house/yardwork, driving, computer work.   [] (48205) Therapist is in constant attendance of 2 or more patients providing skilled therapy interventions, but not providing any significant amount of measurable one-on-one time to either patient, for improvements in  [] LE / lumbar: LE, proximal hip, and core control in self care, mobility, lifting, ambulation and eccentric single leg control. [] UE / cervical: cervical, scapular, scapulothoracic and UE control with self care, reaching, carrying, lifting, house/yardwork, driving, computer work. NMR and Therapeutic Activities:    [x] (10689 or 57816) Provided verbal/tactile cueing for activities related to improving balance, coordination, kinesthetic sense, posture, motor skill, proprioception and motor activation to allow for proper function of   [x] LE: / Lumbar core, proximal hip and LE with self care and ADLs  [] UE / Cervical: cervical, postural, scapular, scapulothoracic and UE control with self care, carrying, lifting, driving, computer work.   [] (83123) Gait Re-education- Provided training and instruction to the patient for proper LE, core and proximal hip recruitment and positioning and eccentric body weight control with ambulation re-education including up and down stairs     Home Management Training / Self Care:  [] (67310) Provided self-care/home management training related to activities of daily living and compensatory training, and/or use of adaptive equipment for improvement with: ADLs and compensatory training, meal preparation, safety procedures and instruction in use of adaptive equipment, including bathing, grooming, dressing, personal hygiene, basic household cleaning and chores.      Home Exercise Program:    [x] (73216) Reviewed/Progressed HEP activities related to strengthening, flexibility, endurance, ROM of   [] LE / Lumbar: core, proximal hip and LE for functional self-care, mobility, lifting and ambulation/stair navigation   [] UE / Cervical: cervical, postural, scapular, scapulothoracic and UE control with self care, reaching, carrying, lifting, house/yardwork, driving, computer work  [] (47477)Reviewed/Progressed HEP activities related to improving balance, coordination, kinesthetic sense, posture, motor skill, proprioception of   [] LE: core, proximal hip and LE for self care, mobility, lifting, and ambulation/stair navigation    [] UE / Cervical: cervical, postural,  scapular, scapulothoracic and UE control with self care, reaching, carrying, lifting, house/yardwork, driving, computer work    Manual Treatments:  PROM / STM / Oscillations-Mobs:  G-I, II, III, IV (PA's, Inf., Post.)  [x] (61024) Provided manual therapy to mobilize LE, proximal hip and/or LS spine soft tissue/joints for the purpose of modulating pain, promoting relaxation,  increasing ROM, reducing/eliminating soft tissue swelling/inflammation/restriction, improving soft tissue extensibility and allowing for proper ROM for normal function with   [x] LE / lumbar: self care, mobility, lifting and ambulation. [] UE / Cervical: self care, reaching, carrying, lifting, house/yardwork, driving, computer work. Modalities:  [] (40199) Vasopneumatic compression: Utilized vasopneumatic compression to decrease edema / swelling for the purpose of improving mobility and quad tone / recruitment which will allow for increased overall function including but not limited to self-care, transfers, ambulation, and ascending / descending stairs.        Charges:  Timed Code Treatment Minutes: 39   Total Treatment Minutes: 39     [] EVAL - LOW (07851)   [] EVAL - MOD (57103)  [] EVAL - HIGH (23334)  [] RE-EVAL (25869)  [x] BT(75715) x  1    [] Ionto  [x] NMR (65980) x 1     [] Vaso  [x] Manual (18917) x    1   [] Ultrasound  [] TA x   1    [] Mech Traction (86188)  [] Aquatic Therapy x     [] ES (un) (21148):   [] Home Management Training x 2  [] ES(attended) (37638)   [] Dry Needling 1-2 muscles (26664):  [] Dry Needling 3+ muscles (936108)  [] Group:      [] Other:     GOALS:  Patient stated goal: less pain  [x]? Progressing: []? Met: []? Not Met: []? Adjusted     Therapist goals for Patient:   Short Term Goals: To be achieved in: 2 weeks  1. Independent in HEP and progression per patient tolerance, in order to prevent re-injury. [x]? Progressing: []? Met: []? Not Met: []? Adjusted  2. Patient will have a decrease in pain to facilitate improvement in movement, function, and ADLs as indicated by improvement with respect to Functional Deficits. [x]? Progressing: []? Met: []? Not Met: []? Adjusted     Long Term Goals: To be achieved in:6 weeks  1. Disability index score of 20 % or less on the   to assist with reaching prior level of function. [x]? Progressing: []? Met: []? Not Met: []? Adjusted  2. Patient will demonstrate increased AROM  to [facility], to allow for proper joint functioning to allow pt to resume ADLS/IADLS, home chores, lifting with good posture and body mechs all  without increase in symptoms. [x]? Progressing: []? Met: []? Not Met: []? Adjusted  3. Patient will demonstrate increased Strength by 1/2 mmt grade  and core activation to allow for proper functional mobility as indicated by patients Functional Deficits to allow pt to resume walking >/=  30-60 min and prn for shopping, going out in Community without increase in symptoms. [x]? Progressing: []? Met: []? Not Met: []? Adjusted  4. 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 [x]? Progressing: []? Met: []? Not Met: []? Adjusted     Overall Progression Towards Functional goals/ Treatment Progress Update:  [x] Patient is progressing as expected towards functional goals listed. [] Progression is slowed due to complexities/Impairments listed. [] Progression has been slowed due to co-morbidities.   [] Plan just implemented, too soon to assess goals progression <30days   [] Goals require adjustment due to lack of progress  [] Patient is not progressing as expected and requires additional follow up with physician  [] Other    Persisting Functional Limitations/Impairments:  [x]Sleeping [x]Sitting               [x]Standing [x]Transfers        [x]Walking []Kneeling               [x]Stairs [x]Squatting / bending   [x]ADLs [x]Reaching  [x]Lifting  [x]Housework  []Driving []Job related tasks  []Sports/Recreation []Other:        ASSESSMENT:  10/14 pt cont to need increased LE and trunk strength to increase fxnl alix of cooking   10/12: Pt back pain appears managed with HEP and prior PT. Medial knee pain does not appear related to back pain as massage and gastroc stretching eliminated pain. Pt may benefit from further assessment in this area. 0/10 pain leaving session, requested pt report back on how long the pain relief lasts post session. 10/8: Pt reports no change in symptoms post session. Progressed balance and TA iso there-ex. Will continue to progress as able. 10/5: Pt reports pain mostly in knee vs. Back however both back and knee pain resolved at end of session, reports R lateral thigh pain post session. 9/30 increasing fxnl alix and increasing alix of HEP/ex at clinic. Improving balance. SB cont to help with pain. 9/28: Patient has shown improvement in posture and LE strength with reduction in low back pain and bilat knee pain. Dependence upon visual system to orient body in space demonstrated in II bars with balance exercises when EC. Will continue to progress balance treatment and PT intervention to increase motor programming/planning to vestibular system. Patient to continue to participate in skilled PT to improve balance, low back and biat knee pain with LE strength per POC.  Patient still requires VC and TC during exercise program.     9/23 increased fxnl alix, strength and reduced pain  pt benefits from improved postures, body mechs and from pacing herself with [facility] home chores/cooking activities. 9/21: Patient is progressing well with gym routine and HEP. Patient very compliant with HEP and understands how it can help her reduce her pain and improve symptoms during ADLs and IADLs. Patient still requires VC and TC with some exercises. Patient continues to document how long she is standing per day with the help of her children and . Patient experiences a great amount of relief with SB exercises. Patient expressed desire to purchase SB, educated patient on retailers and types of balls that she could purchase. Patient will bring in ball in future session to be blown up with air pump. Progress note to be done at next session with balance assessment. 9/16 Patient able to tolerate exercise well with minimal increase in pain. Patient is very compliant with HEP and notes that it is helping her with her pain control. Patient continues to make gains with core control, but requires VC and TC to maintain during some exercises. Patient will continue to record how long she stands per day, as well as purchase a rubber support mat for her kitchen so she is able to stand for longer periods of time in her kitchen. 9/14: Patient able to tolerate gym and mat exercises today due to decreased pain. Patient continues to make core stabilization gains. With the help of her  and children, patient is able to journal how long she stands per day in addition to how easy/hard it is for her to perform her ADL's and IADL's. Patient recently purchased a massage roller off of [employer] and her  has been using the roller on her low back. Patient's  used too much force with roller and patient reports that she was in excessive pain from last Wednesday through today. Appropriate rolling technique was demonstrated and patient has an increased understanding of appropriate force and use of massage roller. 9/9: Patient unable to tolerate full gym program today due to increase in pain.  Patient continues to make core stabilization gains and utilize appropriate body mechanics to minimize fear and risk of falling while also striving for increased standing time at home in order to complete ADL's and IADL's. Patient expressed concern over numbness and tingling in R UE. Patient will be visiting her PCP and wants to discuss the option of receiving occupational therapy to help with her R hand and UE deficits/pain. 9/7: Improved ex tolerance and decreased pain in legs, pain has centralized to low back. Patient continues to make core-strength stabilization gains and desired to work towards standing and sitting for longer periods of time. 9/2 improved ex alix and decreased pain. STM to myofascial pain after tx helps with pain management  8/31 Pt cont to have difficulty doing HEP correctly. Spent extra time re-instructing in how to do HEP correctly - had pt write notes in [place] on HEP handout    Treatment/Activity Tolerance:  [x] Patient able to complete tx [] Patient limited by fatigue  [] Patient limited by pain  [] Patient limited by other medical complications  [] Other:     Prognosis: [x] Good [] Fair  [] Poor    Patient Requires Follow-up: [x] Yes  [] No    PLAN:  strength, ROM/flexibility, posture and body mechs, manual, MOC, HEP, pt education; consider pt bringing family member for education on supporting her with HEP/appropriate activity levels     Frequency/Duration:   2 days per week for 6 Weeks:  Interventions:  [x]? Therapeutic exercise including:strength, ROM, flexibility  [x]? NMR activation and proprioception including postural re-education  [x]? Manual therapy as indicated to include: IASTM, STM, PROM, Gr I-IV mobilizations, manipulation. [x]? Modalities as needed that may include: thermal agents, E-stim, Biofeedback, US, iontophoresis as indicated  [x]? Patient education on joint protection, postural re-education, activity modification, progression of HEP.   AQUATIC THERAPY     Plan for next treatment session: start with emphasis on mat table ex, manual, heat and progress to more fxnl/CKC activities as alix    PLAN: See eval. PT 2x / week for 6  weeks. [x] Continue per plan of care [] Alter current plan (see comments)  [] Plan of care initiated [] Hold pending MD visit [] Discharge    Electronically signed by: Melina Crotes, PT, DPT          Note: If patient does not return for scheduled/ recommended follow up visits, this note will serve as a discharge from care along with most recent update on progress.

## 2021-10-21 ENCOUNTER — HOSPITAL ENCOUNTER (OUTPATIENT)
Dept: PHYSICAL THERAPY | Age: 56
Setting detail: THERAPIES SERIES
Discharge: HOME | End: 2021-10-21
Payer: COMMERCIAL

## 2021-10-21 PROCEDURE — 97112 NEUROMUSCULAR REEDUCATION: CPT

## 2021-10-21 PROCEDURE — 97110 THERAPEUTIC EXERCISES: CPT

## 2021-10-21 PROCEDURE — 97140 MANUAL THERAPY 1/> REGIONS: CPT

## 2021-10-21 NOTE — FLOWSHEET NOTE
[address] [facility] Therapy  Phone: [phone]   Fax: [phone]  Physical Therapy Daily Treatment Note  Date:  10/21/2021    Patient Name:  Quan Mccormack) :  1965  MRN: [id number]  Medical/Treatment Diagnosis Information:  Diagnosis: Chronic bilateral low back pain without sciatica M54.5, G89.29  Treatment Diagnosis: poor habitual postures/body mechs, myofascial pain, strength and flexibility deficits contribute to pt's back and sciatica pain  Insurance/Certification information:  PT Insurance Information: [place] 30 visits soft limit. no auth  Physician Information:  Referring Practitioner: BARRY Strauss  Plan of care signed (Y/N): [x]  Yes- MD signed POC  approving 8 additional visits for this POC [x]  No- MD has not yet signed initial POC, was faxed on 21     Date of Patient follow up with Physician:      Progress Report: []  Yes  [x]  No       Date Range for reporting period:  Beginnin/19  Ending:     Progress report due (10 Rx/or 30 days whichever is less): visit #38 or 452     Recertification due (POC duration/ or 90 days whichever is less): visit #12 or     Visit # Insurance Allowable Auth required? Date Range     +  30 []  Yes  [x]  No NA         Latex Allergy:  [x]NO      []YES  Preferred Language for Healthcare:   []English       [x]other: Sinhala    Functional Scale:        Date assessed:  oswestry: raw score = 31, dysfunction = 62%, taken at initial eval    Pain level: 0/10  B LBP today, 2/10 L medial knee today. Yesterday had mild back pain    SUBJECTIVE: 10/21 everything else is fine but her L knee started hurting this am at 900 am    10/19 has a little bit of pain in low back and knee  10/14: feels ok today back is good, still has L knee pain. Doing home chores - cannot yet do all of them.  Able to stand for about 20 min at time in kitchen    10/12: Pt reports that her back is not hurting her today, pain is mostly in the L knee region (anterior, slightly distal to the joint line). 10/8: Pt reports that she is having pain in her mach and her knee today. 10/5 Pt reports that her back is feeling much better, more concerned with R medial knee pain today. 9/30 no  LBP today, but had pain up and down her R side yesterday. Walked for 30 min yesterday to her sister's house    9/28: Patient reports no knee pain today. Patient states that she is able to  the kitchen for longer periods of time without pain, aprox 15-17 min. Patient notes decrease in back pain today as well. Patient is also using SB that she purchased and she has noted improvement with her low back pain. 9/23 still has trouble cooking due to pain so her family helps her. No knee pain today. Able to stand 15 - 18 min when cooking. OBJECTIVE: 10/19, 10/14, 10/12, 10/8, 10/5, 9/30, 9/28, 9/23, 9/21, 9/16, 9/14, 9/9 9/7, 9/2, 8/31, 8/26,  8/19 used Ipad interpretor for [place]  10/19 stand alix: 17-18 min, sit  alix 12-13 alix. assymetrical mole sup aspect L thigh ap[prx 1 cm diameter - pt advised to show PCP - she is agreeable  9/28: Imbalance with EC and SLS noted in II bars- CGA  9/2 palpate; tender L medial tibial condyle, L medial HS.   8/31 difficulty with TrA iso  8/19 Pt's son present for PT eval and tx. See eval      RESTRICTIONS/PRECAUTIONS: hx of covid 1 yr ago     Exercises/Interventions: PREFERS MAT EX for HEP BC THEY HELP WITH PAIN.  Per pt, low alix of standing ex, especially  for HEP    Therapeutic Exercises (48231) Resistance / level Sets Reps Notes   Nu step      7 min L1 sci-fit S=15, arms =7      bike   IB   HR/TR 3x30\"    2   10    HS Step stretch  Hip flex stretch  2x30\" B   2x30\" B      HS curl 10x ea R/L      TG B squat with Tr A iso  3x10  squats to approx 40-45 deg  9/30 deferred today  -bc  added sit to/from stand practice with TrA iso for HEP   Standing hip 3-way       Stabs  TrA iso  TrA iso + marching Mat exercises  SKTC  Fig 4 pirformis stretch in sitting  Supine piriformis stretch   Supine HS stretch   Supine trunk rotation  3x 30\"  R     Pelvic bridge with TA       Prone press up             SLR       Therapeutic Activities (66608)       See PT education section below       Sit to /from stand  10x with TrA iso          9/21 patient to purchase exercise ball to help with low back pain, may kneed to blow up ball at next session for patient. TA        Instruct in sleep with neutral spine and approp pillow support for neutral spine          Sitting posture- instructed in pelvic neutral positioning. Standing at counter                 Neuromuscular Re-ed (93472)       Seated SB   Wt shifts x 6: a/p, cw/ccw  R/L     . Supine SB ex      Balance exercises in II bars   SLS    SLS Airex      airex  Standing hip 3-way  Partial tandem Eo/EC  NBOS  EC  Full tandem Eo/EC      2 x 30\" CGA B intermittent Finger contact. 10x ea R/ L light fingertipsBilat 2x30\" [id number]\" CGA  SBA     9/28: Progress to airex on future visit when pt can tolerate  Slight imbalance noted with SLS and EC. Manual Intervention (67464)       Roller to B lower quarter, manual stretching to hamstrings, rectus femoris, and piriformis. 9/14: Educated patient on how to instruct  to not roll with max pressure to prevent extensive soreness. SIJ L ASIS higher, leg pull on L  STM to L ITB    8/31 deferred due to time spent re-instructing on HEP    8/26 Big leg length difference    MET       Manual HS and piriformis, glut max stretches               STM L medial knee (just distal to the joint line, gastroc insertion/pes anserine area) light mobs into knee flexion    x8'          OTHER:  9/23 re-assess for PT POC. D/w pt at length re progress, goals, prognosis.  Review and progress HEP      Modalities:  10/14 MHP x10 min to L LE and knee  9/30 trial ice bag to go for mild knee medial knee pain L - pt shifting when standing. 9/9: reviewed HEP. Educated patient on the importance of  feet and using a wide base of support to reduce fear and risk of falling. Patient was educated to record the amount of time she stands per day to estimate what her personal limits are with standing  while completing ADL's and IADL's. Patient has agreed to do this and have her children help her record the amount of time she is on her feet each day. 9/7: reviewed HEP, patient was able to take notes in [place] in addition to drawing pictures on her HEP printouts so she is able to easily perform them at home.   -8/31 review HEP - multiple cues for correct performance. Issued new handout with TrA iso and all ex. Re-explained all ex so that  pt could write notes in [place] in order  to remember how to do it correctly  8/19 patient and her son  Educated extensively on diagnosis, prognosis and expectations for rehab. Instructed pt on approp ex /activity intensity.   -all patient questions were answered    Home Exercise Program:   9/30 Sit to /from stand, seated SB wt shifts a/p, m/l, cw/ccw    9/21 Access Code: [email or web address]  URL: [email or web address]  Date: 09/21/2021  Prepared by: Antonio Catching    Exercises  Supine Straight Leg Raises - 1 x daily - 7 x weekly - 2 sets - 4 reps - 20 hold    9/16 anti-fatigue mat for kitchen; supportive house shoes   9/14: Patient continues to perform HEP and notes that she enjoys her newly prescribed exercises and notes that they are helping her to reduce her pain. Access Code: [email or web address]  URL: [email or web address]  Date: 09/14/2021  Prepared by: Ladell Catching    Exercises  Prone Press Up - 2 x daily - 7 x weekly - 1-2 sets - 10 reps - 3-5 sec hold  Supine Lower Trunk Rotation - 1 x daily - 7 x weekly - 1 sets - 10 reps - 5 hold  Supine Bridge with Pelvic Floor Contraction - 1 x daily - 7 x weekly - 3 sets - 10 reps      9/9 Patient continues to perform HEP and will record the amount of time she stands per to in order to learn what her standing limit is per day. 9/7 pt instructed in pelvic neutral and putting one foot on inside of cabinet to reduce pain while standing in kitchen to cook and wash dishes. Upright sitting posture with lumbar roll and feet on folded pillow (used as stool)  Access Code: [email or web address]  URL: [email or web address]  Date: 08/31/2021  Prepared by: Beatrice Phill    Exercises  Supine Transversus Abdominis Bracing - Hands on Stomach - 2 x daily - 7 x weekly - 1 sets - 10 reps - 5 hold  Supine Piriformis Stretch with Foot on Ground - 2 x daily - 7 x weekly - 1 sets - 3 reps - 30 hold  Hooklying Single Knee to Chest Stretch - 2 x daily - 7 x weekly - 1 sets - 3 reps - 30 hold  Supine Hamstring Stretch with Strap - 1 x daily - 7 x weekly - 3 sets - 10 reps    Access Code: [email or web address]   URL: [email or web address]  Date: 08/19/2021  Prepared by: Beatrice Phill    Exercises  Supine Posterior Pelvic Tilt - 2 x daily - 7 x weekly - 1-2 sets - 10 reps - 5 hold  Supine Hamstring Stretch - 2 x daily - 7 x weekly - 1 sets - 3-5 reps - 20-30 hold  Hooklying Single Knee to Chest Stretch - 2 x daily - 7 x weekly - 1 sets - 3 reps - 30 hold  Supine Piriformis Stretch with Foot on Ground - 2 x daily - 7 x weekly - 1 sets - 3 reps - 30 hold      Therapeutic Exercise and NMR EXR  [x] (64665) Provided verbal/tactile cueing for activities related to strengthening, flexibility, endurance, ROM for improvements in  [x] LE / Lumbar: LE, proximal hip, and core control with self care, mobility, lifting, ambulation.   [] UE / Cervical: cervical, postural, scapular, scapulothoracic and UE control with self care, reaching, carrying, lifting, house/yardwork, driving, computer work.  [] (89071) Provided verbal/tactile cueing for activities related to improving balance, coordination, kinesthetic sense, posture, motor skill, proprioception to assist with   [] LE / lumbar: LE, proximal hip, and core control in self care, mobility, lifting, ambulation and eccentric single leg control. [] UE / cervical: cervical, scapular, scapulothoracic and UE control with self care, reaching, carrying, lifting, house/yardwork, driving, computer work.   [] (27939) Therapist is in constant attendance of 2 or more patients providing skilled therapy interventions, but not providing any significant amount of measurable one-on-one time to either patient, for improvements in  [] LE / lumbar: LE, proximal hip, and core control in self care, mobility, lifting, ambulation and eccentric single leg control. [] UE / cervical: cervical, scapular, scapulothoracic and UE control with self care, reaching, carrying, lifting, house/yardwork, driving, computer work. NMR and Therapeutic Activities:    [x] (92662 or 77546) Provided verbal/tactile cueing for activities related to improving balance, coordination, kinesthetic sense, posture, motor skill, proprioception and motor activation to allow for proper function of   [x] LE: / Lumbar core, proximal hip and LE with self care and ADLs  [] UE / Cervical: cervical, postural, scapular, scapulothoracic and UE control with self care, carrying, lifting, driving, computer work.   [] (38901) Gait Re-education- Provided training and instruction to the patient for proper LE, core and proximal hip recruitment and positioning and eccentric body weight control with ambulation re-education including up and down stairs     Home Management Training / Self Care:  [] (48451) Provided self-care/home management training related to activities of daily living and compensatory training, and/or use of adaptive equipment for improvement with: ADLs and compensatory training, meal preparation, safety procedures and instruction in use of adaptive equipment, including bathing, grooming, dressing, personal hygiene, basic household cleaning and chores.      Home Exercise Program:    [x] (12342) Vaso  [x] Manual (75827) x    1   [] Ultrasound  [] TA x   1    [] Mech Traction (42017)  [] Aquatic Therapy x     [] ES (un) (88488):   [] Home Management Training x 2  [] ES(attended) (40461)   [] Dry Needling 1-2 muscles (95790):  [] Dry Needling 3+ muscles (178570)  [] Group:      [] Other:     GOALS:  Patient stated goal: less pain  [x]? Progressing: []? Met: []? Not Met: []? Adjusted     Therapist goals for Patient:   Short Term Goals: To be achieved in: 2 weeks  1. Independent in HEP and progression per patient tolerance, in order to prevent re-injury. [x]? Progressing: []? Met: []? Not Met: []? Adjusted  2. Patient will have a decrease in pain to facilitate improvement in movement, function, and ADLs as indicated by improvement with respect to Functional Deficits. [x]? Progressing: []? Met: []? Not Met: []? Adjusted     Long Term Goals: To be achieved in:6 weeks  1. Disability index score of 20 % or less on the   to assist with reaching prior level of function. [x]? Progressing: []? Met: []? Not Met: []? Adjusted  2. Patient will demonstrate increased AROM  to [facility], to allow for proper joint functioning to allow pt to resume ADLS/IADLS, home chores, lifting with good posture and body mechs all  without increase in symptoms. [x]? Progressing: []? Met: []? Not Met: []? Adjusted  3. Patient will demonstrate increased Strength by 1/2 mmt grade  and core activation to allow for proper functional mobility as indicated by patients Functional Deficits to allow pt to resume walking >/=  30-60 min and prn for shopping, going out in Community without increase in symptoms. [x]? Progressing: []? Met: []? Not Met: []? Adjusted  4. 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 [x]? Progressing: []? Met: []? Not Met: []?  Adjusted     Overall Progression Towards Functional goals/ Treatment Progress Update:  [x] Patient is progressing as expected towards functional goals listed. [] Progression is slowed due to complexities/Impairments listed. [] Progression has been slowed due to co-morbidities. [] Plan just implemented, too soon to assess goals progression <30days   [] Goals require adjustment due to lack of progress  [] Patient is not progressing as expected and requires additional follow up with physician  [] Other    Persisting Functional Limitations/Impairments:  [x]Sleeping [x]Sitting               [x]Standing [x]Transfers        [x]Walking []Kneeling               [x]Stairs [x]Squatting / bending   [x]ADLs [x]Reaching  [x]Lifting  [x]Housework  []Driving []Job related tasks  []Sports/Recreation []Other:        ASSESSMENT:  10/21: Pt tolerating balance on airex progression to tandem with both EO and EC on this date. Pt continues to have medial joint line tenderness and tenderness at attachment of pes anserine tendon on this date. 10/14 pt cont to need increased LE and trunk strength to increase fxnl alix of cooking   10/12: Pt back pain appears managed with HEP and prior PT. Medial knee pain does not appear related to back pain as massage and gastroc stretching eliminated pain. Pt may benefit from further assessment in this area. 0/10 pain leaving session, requested pt report back on how long the pain relief lasts post session. 10/8: Pt reports no change in symptoms post session. Progressed balance and TA iso there-ex. Will continue to progress as able. 10/5: Pt reports pain mostly in knee vs. Back however both back and knee pain resolved at end of session, reports R lateral thigh pain post session. 9/30 increasing fxnl alix and increasing alix of HEP/ex at clinic. Improving balance. SB cont to help with pain. 9/28: Patient has shown improvement in posture and LE strength with reduction in low back pain and bilat knee pain.  Dependence upon visual system to orient body in space Weeks:  Interventions:  [x]? Therapeutic exercise including:strength, ROM, flexibility  [x]? NMR activation and proprioception including postural re-education  [x]? Manual therapy as indicated to include: IASTM, STM, PROM, Gr I-IV mobilizations, manipulation. [x]? Modalities as needed that may include: thermal agents, E-stim, Biofeedback, US, iontophoresis as indicated  [x]? Patient education on joint protection, postural re-education, activity modification, progression of HEP. AQUATIC THERAPY     Plan for next treatment session: start with emphasis on mat table ex, manual, heat and progress to more fxnl/CKC activities as alix    PLAN: See eval. PT 2x / week for 6  weeks. [x] Continue per plan of care [] Alter current plan (see comments)  [] Plan of care initiated [] Hold pending MD visit [] Discharge    Electronically signed by: Annetta Robles, PT, DPT  Therapist was present, directed the patient's care, made skilled judgement, and was responsible for assessment and treatment of the patient. Aleksandra Escalera, SPT         Note: If patient does not return for scheduled/ recommended follow up visits, this note will serve as a discharge from care along with most recent update on progress.

## 2021-10-26 ENCOUNTER — HOSPITAL ENCOUNTER (OUTPATIENT)
Dept: PHYSICAL THERAPY | Age: 56
Setting detail: THERAPIES SERIES
Discharge: HOME | End: 2021-10-26
Payer: COMMERCIAL

## 2021-10-26 PROCEDURE — 97110 THERAPEUTIC EXERCISES: CPT

## 2021-10-26 PROCEDURE — 97112 NEUROMUSCULAR REEDUCATION: CPT

## 2021-10-26 PROCEDURE — 97140 MANUAL THERAPY 1/> REGIONS: CPT

## 2021-10-26 NOTE — FLOWSHEET NOTE
[facility] Physical Therapy  Phone: [phone]   Fax: [phone]  Physical Therapy Daily Treatment Note  Date:  10/26/2021    Patient Name:  Mariam Pham) :  1965  MRN: [id number]  Medical/Treatment Diagnosis Information:  Diagnosis: Chronic bilateral low back pain without sciatica M54.5, G89.29  Treatment Diagnosis: poor habitual postures/body mechs, myofascial pain, strength and flexibility deficits contribute to pt's back and sciatica pain  Insurance/Certification information:  PT Insurance Information: [place] 30 visits soft limit. no auth  Physician Information:  Referring Practitioner: BARRY Pardo  Plan of care signed (Y/N): [x]  Yes- MD signed POC  approving 8 additional visits for this POC [x]  No- MD has not yet signed initial POC, was faxed on 21     Date of Patient follow up with Physician:      Progress Report: []  Yes  [x]  No       Date Range for reporting period:  Beginnin/19  Ending:     Progress report due (10 Rx/or 30 days whichever is less): visit #51 or 467     Recertification due (POC duration/ or 90 days whichever is less): visit # or     Visit # Insurance Allowable Auth required? Date Range     +  30 []  Yes  [x]  No NA         Latex Allergy:  [x]NO      []YES  Preferred Language for Healthcare:   []English       [x]other: Chinese    Functional Scale:        Date assessed:  oswestry: raw score = 31, dysfunction = 62%, taken at initial eval    Pain level: 0/10  B LBP today, 1/10 L medial knee today. SUBJECTIVE: 10/26 states her son bought tape and has been applying to her knee . Feels good. Can stand x 17-18 min in kitchen   10/21 everything else is fine but her L knee started hurting this am at 900 am    10/19 has a little bit of pain in low back and knee  10/14: feels ok today back is good, still has L knee pain. Doing home chores - cannot yet do all of them.  Able to stand for about 20 min at time in kitchen    10/12: Pt reports that her back is not hurting her today, pain is mostly in the L knee region (anterior, slightly distal to the joint line). 10/8: Pt reports that she is having pain in her mach and her knee today. 10/5 Pt reports that her back is feeling much better, more concerned with R medial knee pain today. 9/30 no  LBP today, but had pain up and down her R side yesterday. Walked for 30 min yesterday to her sister's house    9/28: Patient reports no knee pain today. Patient states that she is able to  the kitchen for longer periods of time without pain, aprox 15-17 min. Patient notes decrease in back pain today as well. Patient is also using SB that she purchased and she has noted improvement with her low back pain. 9/23 still has trouble cooking due to pain so her family helps her. No knee pain today. Able to stand 15 - 18 min when cooking. OBJECTIVE: 10/26 10/21 10/19, 10/14, 10/12, 10/8, 10/5, 9/30, 9/28, 9/23, 9/21, 9/16, 9/14, 9/9 9/7, 9/2, 8/31, 8/26,  8/19 used Ipad interpretor for Kinyar[place]anda  10/19 stand alix: 17-18 min, sit  alix 12-13 alix. assymetrical mole sup aspect L thigh ap[prx 1 cm diameter - pt advised to show PCP - she is agreeable  9/28: Imbalance with EC and SLS noted in II bars- CGA  9/2 palpate; tender L medial tibial condyle, L medial HS.   8/31 difficulty with TrA iso  8/19 Pt's son present for PT eval and tx. See eval      RESTRICTIONS/PRECAUTIONS: hx of covid 1 yr ago     Exercises/Interventions: PREFERS MAT EX for HEP BC THEY HELP WITH PAIN.  Per pt, low alix of standing ex, especially  for HEP    Therapeutic Exercises (05144) Resistance / level Sets Reps Notes   Nu step      7 min L1 sci-fit S=15, arms =7      bike   IB   HR/TR 3x30\"    2   10    HS Step stretch  Hip flex stretch  2x30\" B   2x30\" B      HS curl 10x ea R/L      TG B squat with Tr A iso  3x10  squats to approx 40-45 deg  9/30 deferred today  -bc  added sit to/from stand practice with TrA iso for HEP   Standing hip 3-way       Stabs  TrA iso  TrA iso + marching  Mat exercises  SK  Fig 4 pirformis stretch in sitting  Supine piriformis stretch   Supine HS stretch   Supine trunk rotation  3x 30\"  R     Pelvic bridge with TA       Prone press up             SLR       Therapeutic Activities (24942)       See PT education section below       Sit to /from stand  20x with TrA iso          9/21 patient to purchase exercise ball to help with low back pain, may kneed to blow up ball at next session for patient. TA        Instruct in sleep with neutral spine and approp pillow support for neutral spine          Sitting posture- instructed in pelvic neutral positioning. Standing at counter                 Neuromuscular Re-ed (26962)       Seated SB   Wt shifts x 6: a/p, cw/ccw  R/L     . Supine SB ex      Balance exercises in II bars   SLS    SLS Airex      airex  Standing hip 3-way  Partial tandem Eo/EC  NBOS  EC  Full tandem Eo/EC      2 x 30\" CGA B intermittent Finger contact. 10x ea R/ L light fingertipsBilat 2x30\" [id number]\" SBA/CGA  SBA     9/28: Progress to airex on future visit when pt can tolerate  Slight imbalance noted with SLS and EC. Manual Intervention (21760)       Roller to B lower quarter, manual stretching to hamstrings, rectus femoris, and piriformis. 9/14: Educated patient on how to instruct  to not roll with max pressure to prevent extensive soreness. SIJ L ASIS higher, leg pull on L  STM to L ITB    8/31 deferred due to time spent re-instructing on HEP    8/26 Big leg length difference    MET       Manual HS and piriformis, glut max stretches               STM L medial knee (just distal to the joint line, gastroc insertion/pes anserine area) light mobs into knee flexion    x8'          OTHER:  9/23 re-assess for PT POC. D/w pt at length re progress, goals, prognosis.  Review and progress HEP      Modalities:  10/26 MHP to B knees x 10 min  10/14 MHP x10 min to L LE and knee  9/30 trial ice bag to go for mild knee medial knee pain L - pt advised to expect ice to feel cold, then burny/hurty, then numb - use as alix to help reduce knee pain      Pt. Education:    10/21: Pt educated on standing on cushioned mat pt has at home when standing at the counter and to avoid locking out knees by keeping a slight flexion in the knee when standing along with the importance of keeping weight equal between legs. 10/19 review HEP, encourage seated SB ex for management of LBP\9/30 review and progress HEP. Review SB ex seated for HEP- pt declines reviewing seated SB ex bc she states she is doing well with them at home. Pt educated to try stretches when  Her pain flares up to help it ease    9/28: Patient educated on progression of PT treatment. Patient educated on balance and how this influences her LE strength and her ability to stand for increased periods of time. Patient understands that she is not to attempt balance exercises at home at this time for her own personal safety. Patient understands that balance exercises are only to be attempted in the II bars within therapy session with SBA and CGA.     9/21:Patient expressed desire to purchase SB, educated patient on retailers and types of balls that she could purchase. Patient will bring in ball in future session to be blown up with air pump. Pt educated re she will need UE support in order to do SB ex at home    9/16: Encouraged pt to continue recording how long she is standing per day. Encouraged patient to purchase a rubber mat to stand on (from [employer]store of her choice) to help her increase her standing time in the kitchen. Also encouraged pt to wear house shoes to provide more support and she stands in her kitchen. 9/14: Reviewed HEP and the importance of using the massage roller with gentle force.  Encouraged patient to continue with HEP and continue to journal how long patient is able to tolerate standing. Also emphasized the importance of continuing to use lumbar support towel roll and weight shifting when standing. 9/9: reviewed HEP. Educated patient on the importance of  feet and using a wide base of support to reduce fear and risk of falling. Patient was educated to record the amount of time she stands per day to estimate what her personal limits are with standing  while completing ADL's and IADL's. Patient has agreed to do this and have her children help her record the amount of time she is on her feet each day. 9/7: reviewed HEP, patient was able to take notes in [place] in addition to drawing pictures on her HEP printouts so she is able to easily perform them at home.   -8/31 review HEP - multiple cues for correct performance. Issued new handout with TrA iso and all ex. Re-explained all ex so that  pt could write notes in [place] in order  to remember how to do it correctly  8/19 patient and her son  Educated extensively on diagnosis, prognosis and expectations for rehab. Instructed pt on approp ex /activity intensity.   -all patient questions were answered    Home Exercise Program:   9/30 Sit to /from stand, seated SB wt shifts a/p, m/l, cw/ccw    9/21 Access Code: [email or web address]  URL: [email or web address]  Date: 09/21/2021  Prepared by: Diane Genre    Exercises  Supine Straight Leg Raises - 1 x daily - 7 x weekly - 2 sets - 4 reps - 20 hold    9/16 anti-fatigue mat for kitchen; supportive house shoes   9/14: Patient continues to perform HEP and notes that she enjoys her newly prescribed exercises and notes that they are helping her to reduce her pain. Access Code: [email or web address]  URL: [email or web address]  Date: 09/14/2021  Prepared by: Diane Genre    Exercises  Prone Press Up - 2 x daily - 7 x weekly - 1-2 sets - 10 reps - 3-5 sec hold  Supine Lower Trunk Rotation - 1 x daily - 7 x weekly - 1 sets - 10 reps - 5 hold  Supine Bridge with Pelvic Floor Contraction - 1 x daily - 7 x weekly - 3 sets - 10 reps      9/9 Patient continues to perform HEP and will record the amount of time she stands per to in order to learn what her standing limit is per day. 9/7 pt instructed in pelvic neutral and putting one foot on inside of cabinet to reduce pain while standing in kitchen to cook and wash dishes. Upright sitting posture with lumbar roll and feet on folded pillow (used as stool)  Access Code: [email or web address]  URL: [employer]/  Date: 08/31/2021  Prepared by: Joao Jason    Exercises  Supine Transversus Abdominis Bracing - Hands on Stomach - 2 x daily - 7 x weekly - 1 sets - 10 reps - 5 hold  Supine Piriformis Stretch with Foot on Ground - 2 x daily - 7 x weekly - 1 sets - 3 reps - 30 hold  Hooklying Single Knee to Chest Stretch - 2 x daily - 7 x weekly - 1 sets - 3 reps - 30 hold  Supine Hamstring Stretch with Strap - 1 x daily - 7 x weekly - 3 sets - 10 reps    Access Code: [email or web address]   URL: [employer]/  Date: 08/19/2021  Prepared by: Joao Jason    Exercises  Supine Posterior Pelvic Tilt - 2 x daily - 7 x weekly - 1-2 sets - 10 reps - 5 hold  Supine Hamstring Stretch - 2 x daily - 7 x weekly - 1 sets - 3-5 reps - 20-30 hold  Hooklying Single Knee to Chest Stretch - 2 x daily - 7 x weekly - 1 sets - 3 reps - 30 hold  Supine Piriformis Stretch with Foot on Ground - 2 x daily - 7 x weekly - 1 sets - 3 reps - 30 hold      Therapeutic Exercise and NMR EXR  [x] (18858) Provided verbal/tactile cueing for activities related to strengthening, flexibility, endurance, ROM for improvements in  [x] LE / Lumbar: LE, proximal hip, and core control with self care, mobility, lifting, ambulation.   [] UE / Cervical: cervical, postural, scapular, scapulothoracic and UE control with self care, reaching, carrying, lifting, house/yardwork, driving, computer work.  [] (60221) Provided verbal/tactile cueing for activities related to improving balance, coordination, kinesthetic sense, posture, motor skill, proprioception to assist with   [] LE / lumbar: LE, proximal hip, and core control in self care, mobility, lifting, ambulation and eccentric single leg control. [] UE / cervical: cervical, scapular, scapulothoracic and UE control with self care, reaching, carrying, lifting, house/yardwork, driving, computer work.   [] (10432) Therapist is in constant attendance of 2 or more patients providing skilled therapy interventions, but not providing any significant amount of measurable one-on-one time to either patient, for improvements in  [] LE / lumbar: LE, proximal hip, and core control in self care, mobility, lifting, ambulation and eccentric single leg control. [] UE / cervical: cervical, scapular, scapulothoracic and UE control with self care, reaching, carrying, lifting, house/yardwork, driving, computer work.      NMR and Therapeutic Activities:    [x] (95699 or 64694) Provided verbal/tactile cueing for activities related to improving balance, coordination, kinesthetic sense, posture, motor skill, proprioception and motor activation to allow for proper function of   [x] LE: / Lumbar core, proximal hip and LE with self care and ADLs  [] UE / Cervical: cervical, postural, scapular, scapulothoracic and UE control with self care, carrying, lifting, driving, computer work.   [] (28012) Gait Re-education- Provided training and instruction to the patient for proper LE, core and proximal hip recruitment and positioning and eccentric body weight control with ambulation re-education including up and down stairs     Home Management Training / Self Care:  [] (69171) Provided self-care/home management training related to activities of daily living and compensatory training, and/or use of adaptive equipment for improvement with: ADLs and compensatory training, meal preparation, safety procedures and instruction in use of adaptive equipment, including bathing, grooming, dressing, personal hygiene, basic household cleaning and chores. Home Exercise Program:    [x] (89823) Reviewed/Progressed HEP activities related to strengthening, flexibility, endurance, ROM of   [] LE / Lumbar: core, proximal hip and LE for functional self-care, mobility, lifting and ambulation/stair navigation   [] UE / Cervical: cervical, postural, scapular, scapulothoracic and UE control with self care, reaching, carrying, lifting, house/yardwork, driving, computer work  [] (37220)Reviewed/Progressed HEP activities related to improving balance, coordination, kinesthetic sense, posture, motor skill, proprioception of   [] LE: core, proximal hip and LE for self care, mobility, lifting, and ambulation/stair navigation    [] UE / Cervical: cervical, postural,  scapular, scapulothoracic and UE control with self care, reaching, carrying, lifting, house/yardwork, driving, computer work    Manual Treatments:  PROM / STM / Oscillations-Mobs:  G-I, II, III, IV (PA's, Inf., Post.)  [x] (59119) Provided manual therapy to mobilize LE, proximal hip and/or LS spine soft tissue/joints for the purpose of modulating pain, promoting relaxation,  increasing ROM, reducing/eliminating soft tissue swelling/inflammation/restriction, improving soft tissue extensibility and allowing for proper ROM for normal function with   [x] LE / lumbar: self care, mobility, lifting and ambulation. [] UE / Cervical: self care, reaching, carrying, lifting, house/yardwork, driving, computer work. Modalities:  [] (97795) Vasopneumatic compression: Utilized vasopneumatic compression to decrease edema / swelling for the purpose of improving mobility and quad tone / recruitment which will allow for increased overall function including but not limited to self-care, transfers, ambulation, and ascending / descending stairs.        Charges:  Timed Code Treatment Minutes: 40   Total Treatment Minutes: 50     [] EVAL - LOW (06706)   [] EVAL - MOD (27992)  [] EVAL - HIGH (47163)  [] RE-EVAL (26412)  [x] RL(38563) x  1    [] Ionto  [x] NMR (87071) x 1     [] Vaso  [x] Manual (16099) x    1   [] Ultrasound  [] TA x   1    [] Mech Traction (10576)  [] Aquatic Therapy x     [] ES (un) (67126):   [] Home Management Training x 2  [] ES(attended) (52771)   [] Dry Needling 1-2 muscles (87209):  [] Dry Needling 3+ muscles (848463)  [] Group:      [] Other:     GOALS:  Patient stated goal: less pain  [x]? Progressing: []? Met: []? Not Met: []? Adjusted     Therapist goals for Patient:   Short Term Goals: To be achieved in: 2 weeks  1. Independent in HEP and progression per patient tolerance, in order to prevent re-injury. [x]? Progressing: []? Met: []? Not Met: []? Adjusted  2. Patient will have a decrease in pain to facilitate improvement in movement, function, and ADLs as indicated by improvement with respect to Functional Deficits. [x]? Progressing: []? Met: []? Not Met: []? Adjusted     Long Term Goals: To be achieved in:6 weeks  1. Disability index score of 20 % or less on the   to assist with reaching prior level of function. [x]? Progressing: []? Met: []? Not Met: []? Adjusted  2. Patient will demonstrate increased AROM  to [facility], to allow for proper joint functioning to allow pt to resume ADLS/IADLS, home chores, lifting with good posture and body mechs all  without increase in symptoms. [x]? Progressing: []? Met: []? Not Met: []? Adjusted  3. Patient will demonstrate increased Strength by 1/2 mmt grade  and core activation to allow for proper functional mobility as indicated by patients Functional Deficits to allow pt to resume walking >/=  30-60 min and prn for shopping, going out in [place] without increase in symptoms. [x]? Progressing: []? Met: []? Not Met: []? Adjusted  4.  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 [x]? Progressing: []? Met: []? Not Met: []? Adjusted     Overall Progression Towards Functional goals/ Treatment Progress Update:  [x] Patient is progressing as expected towards functional goals listed. [] Progression is slowed due to complexities/Impairments listed. [] Progression has been slowed due to co-morbidities. [] Plan just implemented, too soon to assess goals progression <30days   [] Goals require adjustment due to lack of progress  [] Patient is not progressing as expected and requires additional follow up with physician  [] Other    Persisting Functional Limitations/Impairments:  [x]Sleeping [x]Sitting               [x]Standing [x]Transfers        [x]Walking []Kneeling               [x]Stairs [x]Squatting / bending   [x]ADLs [x]Reaching  [x]Lifting  [x]Housework  []Driving []Job related tasks  []Sports/Recreation []Other:        ASSESSMENT:  10/26 pt cont to increase with fxnl alix and reduced pain. 10/21: Pt tolerating balance on airex progression to tandem with both EO and EC on this date. Pt continues to have medial joint line tenderness and tenderness at attachment of pes anserine tendon on this date. 10/14 pt cont to need increased LE and trunk strength to increase fxnl alix of cooking   10/12: Pt back pain appears managed with HEP and prior PT. Medial knee pain does not appear related to back pain as massage and gastroc stretching eliminated pain. Pt may benefit from further assessment in this area. 0/10 pain leaving session, requested pt report back on how long the pain relief lasts post session. 10/8: Pt reports no change in symptoms post session. Progressed balance and TA iso there-ex. Will continue to progress as able. 10/5: Pt reports pain mostly in knee vs. Back however both back and knee pain resolved at end of session, reports R lateral thigh pain post session. 9/30 increasing fxnl alix and increasing alix of HEP/ex at clinic. Improving balance.   SB cont to help with pain. 9/28: Patient has shown improvement in posture and LE strength with reduction in low back pain and bilat knee pain. Dependence upon visual system to orient body in space demonstrated in II bars with balance exercises when EC. Will continue to progress balance treatment and PT intervention to increase motor programming/planning to vestibular system. Patient to continue to participate in skilled PT to improve balance, low back and biat knee pain with LE strength per POC. Patient still requires VC and TC during exercise program.     9/23 increased fxnl alix, strength and reduced pain  pt benefits from improved postures, body mechs and from pacing herself with [facility] home chores/cooking activities. 9/21: Patient is progressing well with gym routine and HEP. Patient very compliant with HEP and understands how it can help her reduce her pain and improve symptoms during ADLs and IADLs. Patient still requires VC and TC with some exercises. Patient continues to document how long she is standing per day with the help of her children and . Patient experiences a great amount of relief with SB exercises. Patient expressed desire to purchase SB, educated patient on retailers and types of balls that she could purchase. Patient will bring in ball in future session to be blown up with air pump. Progress note to be done at next session with balance assessment. 9/16 Patient able to tolerate exercise well with minimal increase in pain. Patient is very compliant with HEP and notes that it is helping her with her pain control. Patient continues to make gains with core control, but requires VC and TC to maintain during some exercises. Patient will continue to record how long she stands per day, as well as purchase a rubber support mat for her kitchen so she is able to stand for longer periods of time in her kitchen. 9/14: Patient able to tolerate gym and mat exercises today due to decreased pain. Patient continues to make core stabilization gains. With the help of her  and children, patient is able to journal how long she stands per day in addition to how easy/hard it is for her to perform her ADL's and IADL's. Patient recently purchased a massage roller off of [employer] and her  has been using the roller on her low back. Patient's  used too much force with roller and patient reports that she was in excessive pain from last Wednesday through today. Appropriate rolling technique was demonstrated and patient has an increased understanding of appropriate force and use of massage roller. 9/9: Patient unable to tolerate full gym program today due to increase in pain. Patient continues to make core stabilization gains and utilize appropriate body mechanics to minimize fear and risk of falling while also striving for increased standing time at home in order to complete ADL's and IADL's. Patient expressed concern over numbness and tingling in R UE. Patient will be visiting her PCP and wants to discuss the option of receiving occupational therapy to help with her R hand and UE deficits/pain. 9/7: Improved ex tolerance and decreased pain in legs, pain has centralized to low back. Patient continues to make core-strength stabilization gains and desired to work towards standing and sitting for longer periods of time. 9/2 improved ex alix and decreased pain. STM to myofascial pain after tx helps with pain management  8/31 Pt cont to have difficulty doing HEP correctly.   Spent extra time re-instructing in how to do HEP correctly - had pt write notes in [place] on HEP handout    Treatment/Activity Tolerance:  [x] Patient able to complete tx [] Patient limited by fatigue  [] Patient limited by pain  [] Patient limited by other medical complications  [] Other:     Prognosis: [x] Good [] Fair  [] Poor    Patient Requires Follow-up: [x] Yes  [] No    PLAN:  strength, ROM/flexibility, posture and body mechs, manual, MOC, HEP, pt education; consider pt bringing family member for education on supporting her with HEP/appropriate activity levels     Frequency/Duration:   2 days per week for 6 Weeks:  Interventions:  [x]? Therapeutic exercise including:strength, ROM, flexibility  [x]? NMR activation and proprioception including postural re-education  [x]? Manual therapy as indicated to include: IASTM, STM, PROM, Gr I-IV mobilizations, manipulation. [x]? Modalities as needed that may include: thermal agents, E-stim, Biofeedback, US, iontophoresis as indicated  [x]? Patient education on joint protection, postural re-education, activity modification, progression of HEP. AQUATIC THERAPY     Plan for next treatment session: start with emphasis on mat table ex, manual, heat and progress to more fxnl/CKC activities as alix    PLAN: See eval. PT 2x / week for 6  weeks. [x] Continue per plan of care [] Alter current plan (see comments)  [] Plan of care initiated [] Hold pending MD visit [] Discharge    Electronically signed by: Joao Gomez, PT, DPT          Note: If patient does not return for scheduled/ recommended follow up visits, this note will serve as a discharge from care along with most recent update on progress.

## 2021-10-28 ENCOUNTER — HOSPITAL ENCOUNTER (OUTPATIENT)
Dept: PHYSICAL THERAPY | Age: 56
Setting detail: THERAPIES SERIES
Discharge: HOME | End: 2021-10-28
Payer: COMMERCIAL

## 2021-10-28 PROCEDURE — 97110 THERAPEUTIC EXERCISES: CPT

## 2021-10-28 PROCEDURE — 97112 NEUROMUSCULAR REEDUCATION: CPT

## 2021-10-28 NOTE — FLOWSHEET NOTE
[address] [facility]  Phone: [phone]   Fax: [phone]  Physical Therapy Daily Treatment Note  Date:  10/28/2021    Patient Name:  Falguni Calvillo) :  1965  MRN: [id number]  Medical/Treatment Diagnosis Information:  Diagnosis: Chronic bilateral low back pain without sciatica M54.5, G89.29  Treatment Diagnosis: poor habitual postures/body mechs, myofascial pain, strength and flexibility deficits contribute to pt's back and sciatica pain  Insurance/Certification information:  PT Insurance Information: [employer] 30 visits soft limit. no auth  Physician Information:  Referring Practitioner: BARRY Gloria  Plan of care signed (Y/N): [x]  Yes- MD signed POC  approving 8 additional visits for this POC [x]  No- MD has not yet signed initial POC, was faxed on 21     Date of Patient follow up with Physician:      Progress Report: []  Yes  [x]  No       Date Range for reporting period:  Beginnin/19  Ending:     Progress report due (10 Rx/or 30 days whichever is less): visit #96 or 520     Recertification due (POC duration/ or 90 days whichever is less): visit #12 or     Visit # Insurance Allowable Auth required? Date Range     +  30 []  Yes  [x]  No NA         Latex Allergy:  [x]NO      []YES  Preferred Language for Healthcare:   []English       [x]other: Indonesian    Functional Scale:        Date assessed:  oswestry: raw score = 31, dysfunction = 62%, taken at initial eval    Pain level: 2/10  B LBP today, 0-1/10 L medial knee today. SUBJECTIVE: 10/28 had some R sided back pain yesterday - not sure why. It is OK now. Occasionally gets knee pain with walking. 10/26 states her son bought tape and has been applying to her knee . Feels good.   Can stand x 17-18 min in kitchen   10/21 everything else is fine but her L knee started hurting this am at 900 am    10/19 has a little bit of pain in low back and knee  10/14: feels ok today back is good, still has L knee pain. Doing home chores - cannot yet do all of them. Able to stand for about 20 min at time in kitchen    10/12: Pt reports that her back is not hurting her today, pain is mostly in the L knee region (anterior, slightly distal to the joint line). 10/8: Pt reports that she is having pain in her mach and her knee today. 10/5 Pt reports that her back is feeling much better, more concerned with R medial knee pain today. 9/30 no  LBP today, but had pain up and down her R side yesterday. Walked for 30 min yesterday to her sister's house    9/28: Patient reports no knee pain today. Patient states that she is able to  the kitchen for longer periods of time without pain, aprox 15-17 min. Patient notes decrease in back pain today as well. Patient is also using SB that she purchased and she has noted improvement with her low back pain. 9/23 still has trouble cooking due to pain so her family helps her. No knee pain today. Able to stand 15 - 18 min when cooking. OBJECTIVE:10/28  10/26 10/21 10/19, 10/14, 10/12, 10/8, 10/5, 9/30, 9/28, 9/23, 9/21, 9/16, 9/14, 9/9 9/7, 9/2, 8/31, 8/26,  8/19 used Ipad interpretor for Persian  10/19 stand alix: 17-18 min, sit  alix 12-13 alix. assymetrical mole sup aspect L thigh ap[prx 1 cm diameter - pt advised to show PCP - she is agreeable  9/28: Imbalance with EC and SLS noted in II bars- CGA  9/2 palpate; tender L medial tibial condyle, L medial HS.   8/31 difficulty with TrA iso  8/19 Pt's son present for PT eval and tx. See eval      RESTRICTIONS/PRECAUTIONS: hx of covid 1 yr ago     Exercises/Interventions: PREFERS MAT EX for HEP BC THEY HELP WITH PAIN.  Per pt, low alix of standing ex, especially  for HEP    Therapeutic Exercises (34749) Resistance / level Sets Reps Notes   Nu step      7 min L1 sci-fit S=15, arms =7      bike   IB   HR/TR 3x30\"    2   10    HS Step stretch  Hip flex stretch  2x30\" B 2x30\" B      HS curl 10x ea R/L      TG B squat with Tr A iso  3x10  squats to approx 40-45 deg  9/30 deferred today  -bc  added sit to/from stand practice with TrA iso for HEP   Standing hip 3-way       Stabs  TrA iso  TrA iso + marching  Mat exercises  SKTC  Fig 4 pirformis stretch in sitting  Supine piriformis stretch   Supine HS stretch   Supine trunk rotation  3x 30\"  R     Pelvic bridge with TA       Prone press up             SLR       Therapeutic Activities (38792)       See PT education section below       Sit to /from stand  2x10 with TrA iso          9/21 patient to purchase exercise ball to help with low back pain, may kneed to blow up ball at next session for patient. TA        Instruct in sleep with neutral spine and approp pillow support for neutral spine          Sitting posture- instructed in pelvic neutral positioning. Standing at counter                 Neuromuscular Re-ed (68796)       Seated SB   Wt shifts x 6: a/p, cw/ccw  R/L  LAQ 15x ea    15x ea        . Shuttle balance frd step ups to DLS 5x ea  B UE support   Supine SB ex      Balance exercises in II bars   SLS    SLS Airex      airex  Standing hip 3-way  Partial tandem Eo/EC  NBOS  EC  Full tandem Eo/EC        SBA     9/28: Progress to airex on future visit when pt can tolerate  Slight imbalance noted with SLS and EC. Manual Intervention (29345)       Roller to B lower quarter, manual stretching to hamstrings, rectus femoris, and piriformis. 9/14: Educated patient on how to instruct  to not roll with max pressure to prevent extensive soreness.     SIJ L ASIS higher, leg pull on L  STM to L ITB    8/31 deferred due to time spent re-instructing on HEP    8/26 Big leg length difference    MET       Manual HS and piriformis, glut max stretches               STM L medial knee (just distal to the joint line, gastroc insertion/pes anserine area) light mobs into knee flexion OTHER:  9/23 re-assess for PT POC. D/w pt at length re progress, goals, prognosis. Review and progress HEP      Modalities:  10/28 pt declined MHP  10/26 MHP to B knees x 10 min  10/14 MHP x10 min to L LE and knee  9/30 trial ice bag to go for mild knee medial knee pain L - pt advised to expect ice to feel cold, then burny/hurty, then numb - use as alix to help reduce knee pain      Pt. Education:    10/21: Pt educated on standing on cushioned mat pt has at home when standing at the counter and to avoid locking out knees by keeping a slight flexion in the knee when standing along with the importance of keeping weight equal between legs. 10/19 review HEP, encourage seated SB ex for management of LBP\9/30 review and progress HEP. Review SB ex seated for HEP- pt declines reviewing seated SB ex bc she states she is doing well with them at home. Pt educated to try stretches when  Her pain flares up to help it ease    9/28: Patient educated on progression of PT treatment. Patient educated on balance and how this influences her LE strength and her ability to stand for increased periods of time. Patient understands that she is not to attempt balance exercises at home at this time for her own personal safety. Patient understands that balance exercises are only to be attempted in the II bars within therapy session with SBA and CGA.     9/21:Patient expressed desire to purchase SB, educated patient on retailers and types of balls that she could purchase. Patient will bring in ball in future session to be blown up with air pump. Pt educated re she will need UE support in order to do SB ex at home    9/16: Encouraged pt to continue recording how long she is standing per day. Encouraged patient to purchase a rubber mat to stand on (from [employer]/store of her choice) to help her increase her standing time in the kitchen. Also encouraged pt to wear house shoes to provide more support and she stands in her kitchen.    9/14: Reviewed HEP and the importance of using the massage roller with gentle force. Encouraged patient to continue with HEP and continue to journal how long patient is able to tolerate standing. Also emphasized the importance of continuing to use lumbar support towel roll and weight shifting when standing. 9/9: reviewed HEP. Educated patient on the importance of  feet and using a wide base of support to reduce fear and risk of falling. Patient was educated to record the amount of time she stands per day to estimate what her personal limits are with standing  while completing ADL's and IADL's. Patient has agreed to do this and have her children help her record the amount of time she is on her feet each day. 9/7: reviewed HEP, patient was able to take notes in [place] in addition to drawing pictures on her HEP printouts so she is able to easily perform them at home.   -8/31 review HEP - multiple cues for correct performance. Issued new handout with TrA iso and all ex. Re-explained all ex so that  pt could write notes in [place] in order  to remember how to do it correctly  8/19 patient and her son  Educated extensively on diagnosis, prognosis and expectations for rehab. Instructed pt on approp ex /activity intensity.   -all patient questions were answered    Home Exercise Program:   9/30 Sit to /from stand, seated SB wt shifts a/p, m/l, cw/ccw    9/21 Access Code: [email or web address]  URL: [employer]/  Date: 09/21/2021  Prepared by: tSephanie Tucker    Exercises  Supine Straight Leg Raises - 1 x daily - 7 x weekly - 2 sets - 4 reps - 20 hold    9/16 anti-fatigue mat for kitchen; supportive house shoes   9/14: Patient continues to perform HEP and notes that she enjoys her newly prescribed exercises and notes that they are helping her to reduce her pain. Access Code: [email or web address]  URL: [employer]/  Date: 09/14/2021  Prepared by: Stephanie Tucker    Exercises  Prone Press Up - 2 x daily - 7 x reaching, carrying, lifting, house/yardwork, driving, computer work.  [] (81371) Provided verbal/tactile cueing for activities related to improving balance, coordination, kinesthetic sense, posture, motor skill, proprioception to assist with   [] LE / lumbar: LE, proximal hip, and core control in self care, mobility, lifting, ambulation and eccentric single leg control. [] UE / cervical: cervical, scapular, scapulothoracic and UE control with self care, reaching, carrying, lifting, house/yardwork, driving, computer work.   [] (43005) Therapist is in constant attendance of 2 or more patients providing skilled therapy interventions, but not providing any significant amount of measurable one-on-one time to either patient, for improvements in  [] LE / lumbar: LE, proximal hip, and core control in self care, mobility, lifting, ambulation and eccentric single leg control. [] UE / cervical: cervical, scapular, scapulothoracic and UE control with self care, reaching, carrying, lifting, house/yardwork, driving, computer work.      NMR and Therapeutic Activities:    [x] (69004 or 98773) Provided verbal/tactile cueing for activities related to improving balance, coordination, kinesthetic sense, posture, motor skill, proprioception and motor activation to allow for proper function of   [x] LE: / Lumbar core, proximal hip and LE with self care and ADLs  [] UE / Cervical: cervical, postural, scapular, scapulothoracic and UE control with self care, carrying, lifting, driving, computer work.   [] (61869) Gait Re-education- Provided training and instruction to the patient for proper LE, core and proximal hip recruitment and positioning and eccentric body weight control with ambulation re-education including up and down stairs     Home Management Training / Self Care:  [] (31097) Provided self-care/home management training related to activities of daily living and compensatory training, and/or use of adaptive equipment for improvement with: ADLs and compensatory training, meal preparation, safety procedures and instruction in use of adaptive equipment, including bathing, grooming, dressing, personal hygiene, basic household cleaning and chores. Home Exercise Program:    [x] (96286) Reviewed/Progressed HEP activities related to strengthening, flexibility, endurance, ROM of   [] LE / Lumbar: core, proximal hip and LE for functional self-care, mobility, lifting and ambulation/stair navigation   [] UE / Cervical: cervical, postural, scapular, scapulothoracic and UE control with self care, reaching, carrying, lifting, house/yardwork, driving, computer work  [] (03253)Reviewed/Progressed HEP activities related to improving balance, coordination, kinesthetic sense, posture, motor skill, proprioception of   [] LE: core, proximal hip and LE for self care, mobility, lifting, and ambulation/stair navigation    [] UE / Cervical: cervical, postural,  scapular, scapulothoracic and UE control with self care, reaching, carrying, lifting, house/yardwork, driving, computer work    Manual Treatments:  PROM / STM / Oscillations-Mobs:  G-I, II, III, IV (PA's, Inf., Post.)  [x] (81669) Provided manual therapy to mobilize LE, proximal hip and/or LS spine soft tissue/joints for the purpose of modulating pain, promoting relaxation,  increasing ROM, reducing/eliminating soft tissue swelling/inflammation/restriction, improving soft tissue extensibility and allowing for proper ROM for normal function with   [x] LE / lumbar: self care, mobility, lifting and ambulation. [] UE / Cervical: self care, reaching, carrying, lifting, house/yardwork, driving, computer work.      Modalities:  [] (47556) Vasopneumatic compression: Utilized vasopneumatic compression to decrease edema / swelling for the purpose of improving mobility and quad tone / recruitment which will allow for increased overall function including but not limited to self-care, transfers, ambulation, and ascending / descending stairs. Charges:  Timed Code Treatment Minutes: 40   Total Treatment Minutes: 40     [] EVAL - LOW (41537)   [] EVAL - MOD (36828)  [] EVAL - HIGH (36442)  [] RE-EVAL (26562)  [x] AP(38680) x  2    [] Ionto  [x] NMR (89214) x 1     [] Vaso  [] Manual (51620) x    1   [] Ultrasound  [] TA x   1    [] Mech Traction (31308)  [] Aquatic Therapy x     [] ES (un) (08653):   [] Home Management Training x 2  [] ES(attended) (24858)   [] Dry Needling 1-2 muscles (40546):  [] Dry Needling 3+ muscles (394685)  [] Group:      [] Other:     GOALS:  Patient stated goal: less pain  [x]? Progressing: []? Met: []? Not Met: []? Adjusted     Therapist goals for Patient:   Short Term Goals: To be achieved in: 2 weeks  1. Independent in HEP and progression per patient tolerance, in order to prevent re-injury. [x]? Progressing: []? Met: []? Not Met: []? Adjusted  2. Patient will have a decrease in pain to facilitate improvement in movement, function, and ADLs as indicated by improvement with respect to Functional Deficits. [x]? Progressing: []? Met: []? Not Met: []? Adjusted     Long Term Goals: To be achieved in:6 weeks  1. Disability index score of 20 % or less on the   to assist with reaching prior level of function. [x]? Progressing: []? Met: []? Not Met: []? Adjusted  2. Patient will demonstrate increased AROM  to [facility], to allow for proper joint functioning to allow pt to resume ADLS/IADLS, home chores, lifting with good posture and body mechs all  without increase in symptoms. [x]? Progressing: []? Met: []? Not Met: []? Adjusted  3. Patient will demonstrate increased Strength by 1/2 mmt grade  and core activation to allow for proper functional mobility as indicated by patients Functional Deficits to allow pt to resume walking >/=  30-60 min and prn for shopping, going out in [place] without increase in symptoms. [x]? Progressing: []? Met: []? Not Met: []? Adjusted  4.  Patient will return pain resolved at end of session, reports R lateral thigh pain post session. 9/30 increasing fxnl alix and increasing alix of HEP/ex at clinic. Improving balance. SB cont to help with pain. 9/28: Patient has shown improvement in posture and LE strength with reduction in low back pain and bilat knee pain. Dependence upon visual system to orient body in space demonstrated in II bars with balance exercises when EC. Will continue to progress balance treatment and PT intervention to increase motor programming/planning to vestibular system. Patient to continue to participate in skilled PT to improve balance, low back and biat knee pain with LE strength per POC. Patient still requires VC and TC during exercise program.     9/23 increased fxnl alix, strength and reduced pain  pt benefits from improved postures, body mechs and from pacing herself with [facility] home chores/cooking activities. 9/21: Patient is progressing well with gym routine and HEP. Patient very compliant with HEP and understands how it can help her reduce her pain and improve symptoms during ADLs and IADLs. Patient still requires VC and TC with some exercises. Patient continues to document how long she is standing per day with the help of her children and . Patient experiences a great amount of relief with SB exercises. Patient expressed desire to purchase SB, educated patient on retailers and types of balls that she could purchase. Patient will bring in ball in future session to be blown up with air pump. Progress note to be done at next session with balance assessment. 9/16 Patient able to tolerate exercise well with minimal increase in pain. Patient is very compliant with HEP and notes that it is helping her with her pain control. Patient continues to make gains with core control, but requires VC and TC to maintain during some exercises.  Patient will continue to record how long she stands per day, as well as purchase a rubber support mat for her kitchen so she is able to stand for longer periods of time in her kitchen. 9/14: Patient able to tolerate gym and mat exercises today due to decreased pain. Patient continues to make core stabilization gains. With the help of her  and children, patient is able to journal how long she stands per day in addition to how easy/hard it is for her to perform her ADL's and IADL's. Patient recently purchased a massage roller off of [employer] and her  has been using the roller on her low back. Patient's  used too much force with roller and patient reports that she was in excessive pain from last Wednesday through today. Appropriate rolling technique was demonstrated and patient has an increased understanding of appropriate force and use of massage roller. 9/9: Patient unable to tolerate full gym program today due to increase in pain. Patient continues to make core stabilization gains and utilize appropriate body mechanics to minimize fear and risk of falling while also striving for increased standing time at home in order to complete ADL's and IADL's. Patient expressed concern over numbness and tingling in R UE. Patient will be visiting her PCP and wants to discuss the option of receiving occupational therapy to help with her R hand and UE deficits/pain. 9/7: Improved ex tolerance and decreased pain in legs, pain has centralized to low back. Patient continues to make core-strength stabilization gains and desired to work towards standing and sitting for longer periods of time. 9/2 improved ex alix and decreased pain. STM to myofascial pain after tx helps with pain management  8/31 Pt cont to have difficulty doing HEP correctly.   Spent extra time re-instructing in how to do HEP correctly - had pt write notes in [place] on HEP handout    Treatment/Activity Tolerance:  [x] Patient able to complete tx [] Patient limited by fatigue  [] Patient limited by pain  [] Patient limited by other medical complications  [] Other:     Prognosis: [x] Good [] Fair  [] Poor    Patient Requires Follow-up: [x] Yes  [] No    PLAN:  strength, ROM/flexibility, posture and body mechs, manual, MOC, HEP, pt education; consider pt bringing family member for education on supporting her with HEP/appropriate activity levels     Frequency/Duration:   2 days per week for 6 Weeks:  Interventions:  [x]? Therapeutic exercise including:strength, ROM, flexibility  [x]? NMR activation and proprioception including postural re-education  [x]? Manual therapy as indicated to include: IASTM, STM, PROM, Gr I-IV mobilizations, manipulation. [x]? Modalities as needed that may include: thermal agents, E-stim, Biofeedback, US, iontophoresis as indicated  [x]? Patient education on joint protection, postural re-education, activity modification, progression of HEP. AQUATIC THERAPY     Plan for next treatment session: start with emphasis on mat table ex, manual, heat and progress to more fxnl/CKC activities as alix    PLAN: See eval. PT 2x / week for 6  weeks. [x] Continue per plan of care [] Alter current plan (see comments)  [] Plan of care initiated [] Hold pending MD visit [] Discharge    Electronically signed by: Frederick Gamino, PT, DPT          Note: If patient does not return for scheduled/ recommended follow up visits, this note will serve as a discharge from care along with most recent update on progress.

## 2021-10-29 ENCOUNTER — OFFICE VISIT (OUTPATIENT)
Dept: INTERNAL MEDICINE CLINIC | Age: 56
End: 2021-10-29
Payer: COMMERCIAL

## 2021-10-29 VITALS
OXYGEN SATURATION: 98 % | WEIGHT: 139 LBS | DIASTOLIC BLOOD PRESSURE: 84 MMHG | SYSTOLIC BLOOD PRESSURE: 128 MMHG | HEART RATE: 78 BPM | BODY MASS INDEX: 25.42 KG/M2

## 2021-10-29 DIAGNOSIS — G89.29 CHRONIC BILATERAL LOW BACK PAIN WITHOUT SCIATICA: Primary | ICD-10-CM

## 2021-10-29 DIAGNOSIS — Z23 NEED FOR INFLUENZA VACCINATION: ICD-10-CM

## 2021-10-29 DIAGNOSIS — M54.50 CHRONIC BILATERAL LOW BACK PAIN WITHOUT SCIATICA: Primary | ICD-10-CM

## 2021-10-29 PROCEDURE — G8419 CALC BMI OUT NRM PARAM NOF/U: HCPCS | Performed by: NURSE PRACTITIONER

## 2021-10-29 PROCEDURE — 90674 CCIIV4 VAC NO PRSV 0.5 ML IM: CPT | Performed by: NURSE PRACTITIONER

## 2021-10-29 PROCEDURE — 1036F TOBACCO NON-USER: CPT | Performed by: NURSE PRACTITIONER

## 2021-10-29 PROCEDURE — 99214 OFFICE O/P EST MOD 30 MIN: CPT | Performed by: NURSE PRACTITIONER

## 2021-10-29 PROCEDURE — G8482 FLU IMMUNIZE ORDER/ADMIN: HCPCS | Performed by: NURSE PRACTITIONER

## 2021-10-29 PROCEDURE — 3017F COLORECTAL CA SCREEN DOC REV: CPT | Performed by: NURSE PRACTITIONER

## 2021-10-29 PROCEDURE — G8427 DOCREV CUR MEDS BY ELIG CLIN: HCPCS | Performed by: NURSE PRACTITIONER

## 2021-10-29 RX ORDER — LIDOCAINE 50 MG/G
1 PATCH TOPICAL DAILY
Qty: 10 PATCH | Refills: 3 | Status: SHIPPED | OUTPATIENT
Start: 2021-10-29 | End: 2021-11-08

## 2021-10-29 NOTE — PROGRESS NOTES
10/29/21     Chief Complaint   Patient presents with    Back Pain     3 month f/u     HPI     Pt is here for follow up. She established care in August with complaints of chronic left side lower back pain - she is doing well with PT. Reports it is getting much better. She is doing exercises at home. Has one visit left. Radiated to her left knee at times - has been working with PT on this as well. Therapy has been helping. She denies any pain today. She continues to use lidocaine patches prn if pain is bad. Requesting refills. No Known Allergies    Current Outpatient Medications   Medication Sig Dispense Refill    lidocaine (LIDODERM) 5 % Place 1 patch onto the skin daily for 10 days 12 hours on, 12 hours off. 10 patch 3    diclofenac sodium (VOLTAREN) 1 % GEL Apply 4 g topically 4 times daily as needed for Pain 100 g 3     No current facility-administered medications for this visit. Review of Systems  Negative other than HPI     Vitals:    10/29/21 1044   BP: 128/84   Pulse: 78   SpO2: 98%   Weight: 139 lb (63 kg)      Physical Exam  Constitutional:       General: She is not in acute distress. Appearance: Normal appearance. She is not ill-appearing. HENT:      Head: Normocephalic and atraumatic. Pulmonary:      Effort: Pulmonary effort is normal. No respiratory distress. Musculoskeletal:         General: No tenderness. Right lower leg: No edema. Left lower leg: No edema. Neurological:      General: No focal deficit present. Mental Status: She is alert and oriented to person, place, and time. Mental status is at baseline.    Psychiatric:         Mood and Affect: Mood normal.         Behavior: Behavior normal.       Assessment/Plan:  Chronic bilateral low back pain without sciatica  Has improved significantly with PT   Encouraged pt to continue home exercises  Rx for lidocaine and voltaren to use prn   - lidocaine (LIDODERM) 5 %; Place 1 patch onto the skin daily for 10 days 12 hours on, 12 hours off. Dispense: 10 patch; Refill: 3  - diclofenac sodium (VOLTAREN) 1 % GEL; Apply 4 g topically 4 times daily as needed for Pain  Dispense: 100 g; Refill: 3    Need for influenza vaccination  - INFLUENZA, MDCK QUADV, 2 YRS AND OLDER, IM, PF, PREFILL SYR OR SDV, 0.5ML (FLUCELVAX QUADV, PF)    Discussed medications with patient, who voiced understanding of their use and indications. All questions answered.     FU in Aug when due for annual exam and prn   Video  used during [address]     Electronically signed by HOWARD Young CNP on 10/29/2021 at 11:07 AM

## 2021-11-02 ENCOUNTER — HOSPITAL ENCOUNTER (OUTPATIENT)
Dept: PHYSICAL THERAPY | Age: 56
Setting detail: THERAPIES SERIES
Discharge: HOME | End: 2021-11-02
Payer: COMMERCIAL

## 2021-11-02 PROCEDURE — 97110 THERAPEUTIC EXERCISES: CPT

## 2021-11-02 PROCEDURE — 97112 NEUROMUSCULAR REEDUCATION: CPT

## 2021-11-02 PROCEDURE — 97530 THERAPEUTIC ACTIVITIES: CPT

## 2021-11-02 NOTE — PLAN OF CARE
Physical Therapy Re-Certification Plan of Care    Dear Bernie Lawrence  APRN-CNP  ,    We had the pleasure of treating the following patient for physical therapy services at [facility]. A summary of our findings can be found in the updated assessment below. This includes our plan of care. If you have any questions or concerns regarding these findings, please do not hesitate to contact me at the office phone number checked above. Thank you for the referral.     Physician Signature:________________________________Date:__________________  By signing above (or electronic signature), therapist's plan is approved by physician      Functional Outcome:                      Owsestry Disability Total Scores: 25 /45, disability =       40%                        Overall Response to Treatment:   [x]Patient is responding well to treatment and improvement is noted with regards  to goals   []Patient should continue to improve in reasonable time if they continue HEP   []Patient has plateaued and is no longer responding to skilled PT intervention    []Patient is getting worse and would benefit from return to referring MD   []Patient unable to adhere to initial POC   [x]Other:    O: fxnl alix: stand x15 min, walk x 30 min    A:  Goals grossly met and Pt independent with HEP and ready for dc at this time. She will cont to progress as long as she cont with HEP    Date range of Visits: 21- 21  Total Visits: +     Recommendation: DISCHARGE   []Continue PTx / wk for weeks.                []Hold PT, pending MD visit      [place] [facility]  Phone: [phone]   Fax: [phone]  Physical Therapy Daily Treatment Note  Date:  2021    Patient Name:  Nader Iyer   (Sari Mccarthy) :  1965  MRN: [id number]  Medical/Treatment Diagnosis Information:  Diagnosis: Chronic bilateral low back pain without sciatica M54.5, G89.29  Treatment Diagnosis: poor habitual postures/body mechs, myofascial pain, strength and flexibility deficits contribute to pt's back and sciatica pain  Insurance/Certification information:  PT Insurance Information: [employer] 30 visits soft limit. no auth  Physician Information:  Referring Practitioner: BARRY Armijo  Plan of care signed (Y/N): [x]  Yes- MD signed POC  approving 8 additional visits for this POC [x]  No- MD has not yet signed initial POC, was faxed on 21     Date of Patient follow up with Physician:      Progress Report: []  Yes  [x]  No       Date Range for reporting period:  Beginnin/19  Ending:     Progress report due (10 Rx/or 30 days whichever is less): visit #90 or 221     Recertification due (POC duration/ or 90 days whichever is less): visit #12 or     Visit # Insurance Allowable Auth required? Date Range     +  30 []  Yes  [x]  No NA         Latex Allergy:  [x]NO      []YES  Preferred Language for Healthcare:   []English       [x]other: [facility]    Functional Scale:        Date assessed:  oswestry: raw score = 31, dysfunction = 62%, taken at initial eval    Pain level: 0/10  B LBP today, 0/10 L medial knee today. SUBJECTIVE:  saw the doctor recently - got ointment and a patch to put on if has pain. Can stand for about 15 min and can walk for about 30 min  10/28 had some R sided back pain yesterday - not sure why. It is OK now. Occasionally gets knee pain with walking. 10/26 states her son bought tape and has been applying to her knee . Feels good. Can stand x 17-18 min in kitchen   10/21 everything else is fine but her L knee started hurting this am at 900 am    10/19 has a little bit of pain in low back and knee  10/14: feels ok today back is good, still has L knee pain. Doing home chores - cannot yet do all of them.  Able to stand for about 20 min at time in kitchen    10/12: Pt reports that her back is not hurting her today, pain is mostly in the L knee region (anterior, slightly distal to the joint line). 10/8: Pt reports that she is having pain in her mach and her knee today. 10/5 Pt reports that her back is feeling much better, more concerned with R medial knee pain today. 9/30 no  LBP today, but had pain up and down her R side yesterday. Walked for 30 min yesterday to her sister's house    9/28: Patient reports no knee pain today. Patient states that she is able to  the kitchen for longer periods of time without pain, aprox 15-17 min. Patient notes decrease in back pain today as well. Patient is also using SB that she purchased and she has noted improvement with her low back pain. 9/23 still has trouble cooking due to pain so her family helps her. No knee pain today. Able to stand 15 - 18 min when cooking. OBJECTIVE:11/2 10/28  10/26 10/21 10/19, 10/14, 10/12, 10/8, 10/5, 9/30, 9/28, 9/23, 9/21, 9/16, 9/14, 9/9 9/7, 9/2, 8/31, 8/26,  8/19 used Ipad interpretor for Estonian  10/19 stand alix: 17-18 min, sit  alix 12-13 alix. assymetrical mole sup aspect L thigh ap[prx 1 cm diameter - pt advised to show PCP - she is agreeable  9/28: Imbalance with EC and SLS noted in II bars- CGA  9/2 palpate; tender L medial tibial condyle, L medial HS.   8/31 difficulty with TrA iso  8/19 Pt's son present for PT eval and tx. See eval      RESTRICTIONS/PRECAUTIONS: hx of covid 1 yr ago     Exercises/Interventions: PREFERS MAT EX for HEP BC THEY HELP WITH PAIN.  Per pt, low alix of standing ex, especially  for HEP    Therapeutic Exercises (92625) Resistance / level Sets Reps Notes   Nu step      6.5 min L1 sci-fit S=15, arms =7      bike   IB   HR/TR 3x30\"    2   10    HS Step stretch  Hip flex stretch  3x30\" B   3x30\" B      HS curl 2x10x ea R/L      TG B squat with Tr A iso  4x10  squats to approx 40-45 deg  9/30 deferred today  -bc  added sit to/from stand practice with TrA iso for HEP   Standing hip 3-way       Stabs  TrA iso  TrA iso + marching  Mat exercises  SKTC  Fig 4 pirformis stretch in sitting  Supine piriformis stretch   Supine HS stretch   Supine trunk rotation  3x 30\"  R     Pelvic bridge with TA       Prone press up             SLR       Therapeutic Activities (49402)       See PT education section below       Sit to /from stand  25x with TrA iso          9/21 patient to purchase exercise ball to help with low back pain, may kneed to blow up ball at next session for patient. TA        Instruct in sleep with neutral spine and approp pillow support for neutral spine          Sitting posture- instructed in pelvic neutral positioning. Standing at counter                 Neuromuscular Re-ed [phone])       Seated SB   Wt shifts x 6: a/p, cw/ccw  R/L  LAQ         . Shuttle balance frd step ups to DLS: 5F2R ea  B UE support   Supine SB ex      Balance exercises in II bars   SLS    SLS Airex      airex  Standing hip 3-way  Partial tandem Eo/EC  NBOS  EC  Full tandem Eo/EC      10x ea R/ L light [id number] light ingertips    SBA     9/28: Progress to airex on future visit when pt can tolerate  Slight imbalance noted with SLS and EC. Manual Intervention (62856)       Roller to B lower quarter, manual stretching to hamstrings, rectus femoris, and piriformis. 9/14: Educated patient on how to instruct  to not roll with max pressure to prevent extensive soreness. SIJ L ASIS higher, leg pull on L  STM to L ITB    8/31 deferred due to time spent re-instructing on HEP    8/26 Big leg length difference    MET       Manual HS and piriformis, glut max stretches               STM L medial knee (just distal to the joint line, gastroc insertion/pes anserine area) light mobs into knee flexion              OTHER:  9/23 re-assess for PT POC. D/w pt at length re progress, goals, prognosis.  Review and progress HEP      Modalities:  10/28 pt declined MHP  10/26 MHP to B knees x 10 min  10/14 MHP x10 min to L LE and knee  9/30 trial ice bag to go for mild knee medial knee pain L - pt advised to expect ice to feel cold, then burny/hurty, then numb - use as alix to help reduce knee pain      Pt. Education:  11/2 assess for PT POC, d/w pt re progress toward goals, review HEP.    10/21: Pt educated on standing on cushioned mat pt has at home when standing at the counter and to avoid locking out knees by keeping a slight flexion in the knee when standing along with the importance of keeping weight equal between legs. 10/19 review HEP, encourage seated SB ex for management of LBP\9/30 review and progress HEP. Review SB ex seated for HEP- pt declines reviewing seated SB ex bc she states she is doing well with them at home. Pt educated to try stretches when  Her pain flares up to help it ease    9/28: Patient educated on progression of PT treatment. Patient educated on balance and how this influences her LE strength and her ability to stand for increased periods of time. Patient understands that she is not to attempt balance exercises at home at this time for her own personal safety. Patient understands that balance exercises are only to be attempted in the II bars within therapy session with SBA and CGA.     9/21:Patient expressed desire to purchase SB, educated patient on retailers and types of balls that she could purchase. Patient will bring in ball in future session to be blown up with air pump. Pt educated re she will need UE support in order to do SB ex at home    9/16: Encouraged pt to continue recording how long she is standing per day. Encouraged patient to purchase a rubber mat to stand on (from [employer]/store of her choice) to help her increase her standing time in the kitchen. Also encouraged pt to wear house shoes to provide more support and she stands in her kitchen. 9/14: Reviewed HEP and the importance of using the massage roller with gentle force.  Encouraged patient to continue with HEP and continue to journal how long patient is able to tolerate standing. Also emphasized the importance of continuing to use lumbar support towel roll and weight shifting when standing. 9/9: reviewed HEP. Educated patient on the importance of  feet and using a wide base of support to reduce fear and risk of falling. Patient was educated to record the amount of time she stands per day to estimate what her personal limits are with standing  while completing ADL's and IADL's. Patient has agreed to do this and have her children help her record the amount of time she is on her feet each day. 9/7: reviewed HEP, patient was able to take notes in [place] in addition to drawing pictures on her HEP printouts so she is able to easily perform them at home.   -8/31 review HEP - multiple cues for correct performance. Issued new handout with TrA iso and all ex. Re-explained all ex so that  pt could write notes in [place] in order  to remember how to do it correctly  8/19 patient and her son  Educated extensively on diagnosis, prognosis and expectations for rehab. Instructed pt on approp ex /activity intensity.   -all patient questions were answered    Home Exercise Program:   9/30 Sit to /from stand, seated SB wt shifts a/p, m/l, cw/ccw    9/21 Access Code: [email or web address]  URL: [email or web address]  Date: 09/21/2021  Prepared by: Melina Cortes    Exercises  Supine Straight Leg Raises - 1 x daily - 7 x weekly - 2 sets - 4 reps - 20 hold    9/16 anti-fatigue mat for kitchen; supportive house shoes   9/14: Patient continues to perform HEP and notes that she enjoys her newly prescribed exercises and notes that they are helping her to reduce her pain. Access Code: [email or web address]  URL: [email or web address]  Date: 09/14/2021  Prepared by: Melina Cortes    Exercises  Prone Press Up - 2 x daily - 7 x weekly - 1-2 sets - 10 reps - 3-5 sec hold  Supine Lower Trunk Rotation - 1 x daily - 7 x weekly - 1 sets - 10 reps - 5 hold  Supine Bridge with Pelvic Floor Contraction - 1 x daily - 7 x weekly - 3 sets - 10 reps      9/9 Patient continues to perform HEP and will record the amount of time she stands per to in order to learn what her standing limit is per day. 9/7 pt instructed in pelvic neutral and putting one foot on inside of cabinet to reduce pain while standing in kitchen to cook and wash dishes. Upright sitting posture with lumbar roll and feet on folded pillow (used as stool)  Access Code: [email or web address]  URL: [email or web address]  Date: 08/31/2021  Prepared by: Derick Keene    Exercises  Supine Transversus Abdominis Bracing - Hands on Stomach - 2 x daily - 7 x weekly - 1 sets - 10 reps - 5 hold  Supine Piriformis Stretch with Foot on Ground - 2 x daily - 7 x weekly - 1 sets - 3 reps - 30 hold  Hooklying Single Knee to Chest Stretch - 2 x daily - 7 x weekly - 1 sets - 3 reps - 30 hold  Supine Hamstring Stretch with Strap - 1 x daily - 7 x weekly - 3 sets - 10 reps    Access Code: [email or web address]   URL: [email or web address]  Date: 08/19/2021  Prepared by: Derick [place]    Exercises  Supine Posterior Pelvic Tilt - 2 x daily - 7 x weekly - 1-2 sets - 10 reps - 5 hold  Supine Hamstring Stretch - 2 x daily - 7 x weekly - 1 sets - 3-5 reps - 20-30 hold  Hooklying Single Knee to Chest Stretch - 2 x daily - 7 x weekly - 1 sets - 3 reps - 30 hold  Supine Piriformis Stretch with Foot on Ground - 2 x daily - 7 x weekly - 1 sets - 3 reps - 30 hold      Therapeutic Exercise and NMR EXR  [x] (06165) Provided verbal/tactile cueing for activities related to strengthening, flexibility, endurance, ROM for improvements in  [x] LE / Lumbar: LE, proximal hip, and core control with self care, mobility, lifting, ambulation.   [] UE / Cervical: cervical, postural, scapular, scapulothoracic and UE control with self care, reaching, carrying, lifting, house/yardwork, driving, computer work.  [] (45570) Provided verbal/tactile cueing for activities related to improving balance, coordination, kinesthetic sense, posture, motor skill, proprioception to assist with   [] LE / lumbar: LE, proximal hip, and core control in self care, mobility, lifting, ambulation and eccentric single leg control. [] UE / cervical: cervical, scapular, scapulothoracic and UE control with self care, reaching, carrying, lifting, house/yardwork, driving, computer work.   [] (08892) Therapist is in constant attendance of 2 or more patients providing skilled therapy interventions, but not providing any significant amount of measurable one-on-one time to either patient, for improvements in  [] LE / lumbar: LE, proximal hip, and core control in self care, mobility, lifting, ambulation and eccentric single leg control. [] UE / cervical: cervical, scapular, scapulothoracic and UE control with self care, reaching, carrying, lifting, house/yardwork, driving, computer work.      NMR and Therapeutic Activities:    [x] (41619 or 34121) Provided verbal/tactile cueing for activities related to improving balance, coordination, kinesthetic sense, posture, motor skill, proprioception and motor activation to allow for proper function of   [x] LE: / Lumbar core, proximal hip and LE with self care and ADLs  [] UE / Cervical: cervical, postural, scapular, scapulothoracic and UE control with self care, carrying, lifting, driving, computer work.   [] (58212) Gait Re-education- Provided training and instruction to the patient for proper LE, core and proximal hip recruitment and positioning and eccentric body weight control with ambulation re-education including up and down stairs     Home Management Training / Self Care:  [] (71032) Provided self-care/home management training related to activities of daily living and compensatory training, and/or use of adaptive equipment for improvement with: ADLs and compensatory training, meal preparation, safety procedures and instruction in use of adaptive equipment, including bathing, grooming, dressing, personal hygiene, basic household cleaning and chores. Home Exercise Program:    [x] (42651) Reviewed/Progressed HEP activities related to strengthening, flexibility, endurance, ROM of   [] LE / Lumbar: core, proximal hip and LE for functional self-care, mobility, lifting and ambulation/stair navigation   [] UE / Cervical: cervical, postural, scapular, scapulothoracic and UE control with self care, reaching, carrying, lifting, house/yardwork, driving, computer work  [] (43684)Reviewed/Progressed HEP activities related to improving balance, coordination, kinesthetic sense, posture, motor skill, proprioception of   [] LE: core, proximal hip and LE for self care, mobility, lifting, and ambulation/stair navigation    [] UE / Cervical: cervical, postural,  scapular, scapulothoracic and UE control with self care, reaching, carrying, lifting, house/yardwork, driving, computer work    Manual Treatments:  PROM / STM / Oscillations-Mobs:  G-I, II, III, IV (PA's, Inf., Post.)  [x] (40673) Provided manual therapy to mobilize LE, proximal hip and/or LS spine soft tissue/joints for the purpose of modulating pain, promoting relaxation,  increasing ROM, reducing/eliminating soft tissue swelling/inflammation/restriction, improving soft tissue extensibility and allowing for proper ROM for normal function with   [x] LE / lumbar: self care, mobility, lifting and ambulation. [] UE / Cervical: self care, reaching, carrying, lifting, house/yardwork, driving, computer work. Modalities:  [] (26957) Vasopneumatic compression: Utilized vasopneumatic compression to decrease edema / swelling for the purpose of improving mobility and quad tone / recruitment which will allow for increased overall function including but not limited to self-care, transfers, ambulation, and ascending / descending stairs.        Charges:  Timed Code Treatment Minutes: 41   Total Treatment Minutes: 41     [] EVAL - LOW (59191)   [] EVAL - MOD (43145)  [] EVAL - HIGH (28490)  [] RE-EVAL (24045)  [x] ZD(29729) x  1    [] Ionto  [x] NMR (27791) x 1     [] Vaso  [] Manual (94438) x    1   [] Ultrasound  [x] TA x   1    [] Mech Traction (17989)  [] Aquatic Therapy x     [] ES (un) (83300):   [] Home Management Training x 2  [] ES(attended) (08047)   [] Dry Needling 1-2 muscles (25967):  [] Dry Needling 3+ muscles (903330)  [] Group:      [] Other:     GOALS:  Patient stated goal: less pain  [x]? Progressing: []? Met: []? Not Met: []? Adjusted     Therapist goals for Patient:   Short Term Goals: To be achieved in: 2 weeks  1. Independent in HEP and progression per patient tolerance, in order to prevent re-injury. []? Progressing: [x]? Met: []? Not Met: []? Adjusted  2. Patient will have a decrease in pain to facilitate improvement in movement, function, and ADLs as indicated by improvement with respect to Functional Deficits. []? Progressing: [x]? Met: []? Not Met: []? Adjusted     Long Term Goals: To be achieved in:6 weeks  1. Disability index score of 20 % or less on the   to assist with reaching prior level of function. [x]? Progressing: []? Met: []? Not Met: []? Adjusted  2. Patient will demonstrate increased AROM  to [facility], to allow for proper joint functioning to allow pt to resume ADLS/IADLS, home chores, lifting with good posture and body mechs all  without increase in symptoms. [x]? Progressing: []? Met: []? Not Met: []? Adjusted  3. Patient will demonstrate increased Strength by 1/2 mmt grade  and core activation to allow for proper functional mobility as indicated by patients Functional Deficits to allow pt to resume walking >/=  30-60 min and prn for shopping, going out in Community without increase in symptoms. []? Progressing: [x]? Met: []? Not Met: []? Adjusted  4.  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 [x]? Progressing: []? Met: []? Not Met: []? Adjusted     Overall Progression Towards Functional goals/ Treatment Progress Update:  [x] Patient is progressing as expected towards functional goals listed. [] Progression is slowed due to complexities/Impairments listed. [] Progression has been slowed due to co-morbidities. [] Plan just implemented, too soon to assess goals progression <30days   [] Goals require adjustment due to lack of progress  [] Patient is not progressing as expected and requires additional follow up with physician  [] Other    Persisting Functional Limitations/Impairments:  [x]Sleeping [x]Sitting               [x]Standing [x]Transfers        [x]Walking []Kneeling               [x]Stairs [x]Squatting / bending   [x]ADLs [x]Reaching  [x]Lifting  [x]Housework  []Driving []Job related tasks  []Sports/Recreation []Other:        ASSESSMENT:  11/2 see PT POC above  10/28 stretching and exercise reduce mm pain  10/26 pt cont to increase with fxnl alix and reduced pain. 10/21: Pt tolerating balance on airex progression to tandem with both EO and EC on this date. Pt continues to have medial joint line tenderness and tenderness at attachment of pes anserine tendon on this date. 10/14 pt cont to need increased LE and trunk strength to increase fxnl alix of cooking   10/12: Pt back pain appears managed with HEP and prior PT. Medial knee pain does not appear related to back pain as massage and gastroc stretching eliminated pain. Pt may benefit from further assessment in this area. 0/10 pain leaving session, requested pt report back on how long the pain relief lasts post session. 10/8: Pt reports no change in symptoms post session. Progressed balance and TA iso there-ex. Will continue to progress as able. 10/5: Pt reports pain mostly in knee vs. Back however both back and knee pain resolved at end of session, reports R lateral thigh pain post session.      9/30 increasing fxnl alix and increasing alix of HEP/ex at clinic. Improving balance. SB cont to help with pain. 9/28: Patient has shown improvement in posture and LE strength with reduction in low back pain and bilat knee pain. Dependence upon visual system to orient body in space demonstrated in II bars with balance exercises when EC. Will continue to progress balance treatment and PT intervention to increase motor programming/planning to vestibular system. Patient to continue to participate in skilled PT to improve balance, low back and biat knee pain with LE strength per POC. Patient still requires VC and TC during exercise program.     9/23 increased fxnl alix, strength and reduced pain  pt benefits from improved postures, body mechs and from pacing herself with [facility] home chores/cooking activities. 9/21: Patient is progressing well with gym routine and HEP. Patient very compliant with HEP and understands how it can help her reduce her pain and improve symptoms during ADLs and IADLs. Patient still requires VC and TC with some exercises. Patient continues to document how long she is standing per day with the help of her children and . Patient experiences a great amount of relief with SB exercises. Patient expressed desire to purchase SB, educated patient on retailers and types of balls that she could purchase. Patient will bring in ball in future session to be blown up with air pump. Progress note to be done at next session with balance assessment. 9/16 Patient able to tolerate exercise well with minimal increase in pain. Patient is very compliant with HEP and notes that it is helping her with her pain control. Patient continues to make gains with core control, but requires VC and TC to maintain during some exercises. Patient will continue to record how long she stands per day, as well as purchase a rubber support mat for her kitchen so she is able to stand for longer periods of time in her kitchen. 9/14: Patient able to tolerate gym and mat exercises today due to decreased pain. Patient continues to make core stabilization gains. With the help of her  and children, patient is able to journal how long she stands per day in addition to how easy/hard it is for her to perform her ADL's and IADL's. Patient recently purchased a massage roller off of [address] and her  has been using the roller on her low back. Patient's  used too much force with roller and patient reports that she was in excessive pain from last Wednesday through today. Appropriate rolling technique was demonstrated and patient has an increased understanding of appropriate force and use of massage roller. 9/9: Patient unable to tolerate full gym program today due to increase in pain. Patient continues to make core stabilization gains and utilize appropriate body mechanics to minimize fear and risk of falling while also striving for increased standing time at home in order to complete ADL's and IADL's. Patient expressed concern over numbness and tingling in R UE. Patient will be visiting her PCP and wants to discuss the option of receiving occupational therapy to help with her R hand and UE deficits/pain. 9/7: Improved ex tolerance and decreased pain in legs, pain has centralized to low back. Patient continues to make core-strength stabilization gains and desired to work towards standing and sitting for longer periods of time. 9/2 improved ex alix and decreased pain. STM to myofascial pain after tx helps with pain management  8/31 Pt cont to have difficulty doing HEP correctly.   Spent extra time re-instructing in how to do HEP correctly - had pt write notes in [place] on HEP handout    Treatment/Activity Tolerance:  [x] Patient able to complete tx [] Patient limited by fatigue  [] Patient limited by pain  [] Patient limited by other medical complications  [] Other:     Prognosis: [x] Good [] Fair  [] Poor    Patient

## 2022-02-07 ENCOUNTER — NURSE TRIAGE (OUTPATIENT)
Dept: OTHER | Facility: CLINIC | Age: 57
End: 2022-02-07

## 2022-02-07 ENCOUNTER — OFFICE VISIT (OUTPATIENT)
Dept: INTERNAL MEDICINE CLINIC | Age: 57
End: 2022-02-07
Payer: COMMERCIAL

## 2022-02-07 VITALS
OXYGEN SATURATION: 97 % | DIASTOLIC BLOOD PRESSURE: 84 MMHG | HEART RATE: 80 BPM | SYSTOLIC BLOOD PRESSURE: 128 MMHG | WEIGHT: 142 LBS | BODY MASS INDEX: 25.97 KG/M2

## 2022-02-07 DIAGNOSIS — M25.512 ACUTE PAIN OF LEFT SHOULDER: Primary | ICD-10-CM

## 2022-02-07 PROCEDURE — 99214 OFFICE O/P EST MOD 30 MIN: CPT | Performed by: NURSE PRACTITIONER

## 2022-02-07 PROCEDURE — G8482 FLU IMMUNIZE ORDER/ADMIN: HCPCS | Performed by: NURSE PRACTITIONER

## 2022-02-07 PROCEDURE — 1036F TOBACCO NON-USER: CPT | Performed by: NURSE PRACTITIONER

## 2022-02-07 PROCEDURE — 3017F COLORECTAL CA SCREEN DOC REV: CPT | Performed by: NURSE PRACTITIONER

## 2022-02-07 PROCEDURE — G8419 CALC BMI OUT NRM PARAM NOF/U: HCPCS | Performed by: NURSE PRACTITIONER

## 2022-02-07 PROCEDURE — G8427 DOCREV CUR MEDS BY ELIG CLIN: HCPCS | Performed by: NURSE PRACTITIONER

## 2022-02-07 RX ORDER — METHYLPREDNISOLONE 4 MG/1
TABLET ORAL
Qty: 1 KIT | Refills: 0 | Status: SHIPPED | OUTPATIENT
Start: 2022-02-07 | End: 2022-04-06

## 2022-02-07 ASSESSMENT — ENCOUNTER SYMPTOMS
WHEEZING: 0
SHORTNESS OF BREATH: 0
COUGH: 0
CHEST TIGHTNESS: 0

## 2022-02-07 NOTE — PATIENT INSTRUCTIONS

## 2022-02-07 NOTE — PROGRESS NOTES
2/7/22     Chief Complaint   Patient presents with    Arm Pain     left arm pain 2-3 weeks - from shoulder to mid arm      HPI     Left upper arm pain from shoulder to elbow, worsening since onset. Started 2-3 weeks ago. Denies numbness or tingling. Has had decreased ROM. Denies any known recent injury. Had an accident in 2015 that caused back pain, this is chronic and persistent. No Known Allergies    Current Outpatient Medications   Medication Sig Dispense Refill    methylPREDNISolone (MEDROL DOSEPACK) 4 MG tablet Take by mouth. 1 kit 0    diclofenac sodium (VOLTAREN) 1 % GEL Apply 4 g topically 4 times daily as needed for Pain 100 g 3     No current facility-administered medications for this visit. Review of Systems   Constitutional: Negative for chills, fatigue and fever. Respiratory: Negative for cough, chest tightness, shortness of breath and wheezing. Cardiovascular: Negative for chest pain, palpitations and leg swelling. Musculoskeletal: Positive for arthralgias. Neurological: Negative for dizziness, tremors, light-headedness and headaches. Vitals:    02/07/22 1551   BP: 128/84   Pulse: 80   SpO2: 97%   Weight: 142 lb (64.4 kg)      Physical Exam  Constitutional:       General: She is not in acute distress. Appearance: Normal appearance. She is not ill-appearing. HENT:      Head: Normocephalic and atraumatic. Pulmonary:      Effort: Pulmonary effort is normal. No respiratory distress. Musculoskeletal:      Left shoulder: Tenderness present. Decreased range of motion. Decreased strength (due to pain). Neurological:      General: No focal deficit present. Mental Status: She is alert and oriented to person, place, and time. Mental status is at baseline.    Psychiatric:         Mood and Affect: Mood normal.         Behavior: Behavior normal.       Assessment/Plan:  Acute pain of left shoulder  Uncontrolled  Exam consistent with MSK etiology   Home therapy exercises

## 2022-02-14 ENCOUNTER — HOSPITAL ENCOUNTER (OUTPATIENT)
Dept: PHYSICAL THERAPY | Age: 57
Setting detail: THERAPIES SERIES
Discharge: HOME | End: 2022-02-14
Payer: COMMERCIAL

## 2022-02-14 PROCEDURE — 97161 PT EVAL LOW COMPLEX 20 MIN: CPT

## 2022-02-14 PROCEDURE — 97110 THERAPEUTIC EXERCISES: CPT

## 2022-02-14 NOTE — FLOWSHEET NOTE
[address] [facility] Physical Therapy  Phone: [phone]   Fax: [phone]    Physical Therapy Daily Treatment Note  Date:  2022    Patient Name:  Samuel Paulino    :  1965  MRN: [id number]  Medical/Treatment Diagnosis Information:  Diagnosis: V72.253 (ICD-10-CM) - Acute pain of left shoulder  Treatment Diagnosis: Postural dysnfucntion, decreased strength and AROM  Insurance/Certification information:  PT Insurance Information: [facility] Plan  Physician Information:  Referring Practitioner: BARRY Juarez  Plan of care signed (Y/N): []  Yes [x]  No     Date of Patient follow up with Physician:      Progress Report: []  Yes  [x]  No     Date Range for reporting period:  Beginnin22  Ending:     Progress report due (10 Rx/or 30 days whichever is less): visit #10 or  (date)     Recertification due (POC duration/ or 90 days whichever is less): visit #10 or 22 (date)     Visit # Insurance Allowable Auth required?  Date Range   1/10 30-soft max []  Yes  [x]  No -       Latex Allergy:  [x]NO      []YES  Preferred Language for Healthcare:   [x]English       []other:    Functional Scale:         Date assessed:  QuickDASH: raw score = 55 dysfunction = 100%   22    Pain level:  8-9/10     SUBJECTIVE:  See eval    OBJECTIVE: See eval      RESTRICTIONS/PRECAUTIONS:     Exercises/Interventions:     Therapeutic Exercises (21089) Resistance / level Sets/sec Reps Notes   Arm bike-if alix       pullies       Lat pull down  High row  Mid row        Shoulder ER  2 10 HEP   scap squeeze  10 x 5 sec  HEP   SL abd, flex                            Therapeutic Activities (71293)       Shelf taps                                   Neuromuscular Re-ed (83485)       Median nv glide                                           Manual Intervention (03679)       Gentle traction        Shoulder PROM                                       Modalities: Pt. Education:  -patient educated on diagnosis, prognosis and expectations for rehab  -all patient questions were answered    Home Exercise Program:  2/14/22:  Access Code: [facility]  URL: [email or web address]  Date: 02/14/2022  Prepared by: Jasson Shaw    Exercises  Shoulder External Rotation and Scapular Retraction with Resistance - 2 x daily - 7 x weekly - 2 sets - 10 reps  Seated Scapular Retraction - 2 x daily - 7 x weekly - 1 sets - 10 reps - 5 sec hold        Therapeutic Exercise and NMR EXR  [x] (65980) Provided verbal/tactile cueing for activities related to strengthening, flexibility, endurance, ROM for improvements in  [] LE / Lumbar: LE, proximal hip, and core control with self care, mobility, lifting, ambulation. [x] UE / Cervical: cervical, postural, scapular, scapulothoracic and UE control with self care, reaching, carrying, lifting, house/yardwork, driving, computer work.  [] (57819) Provided verbal/tactile cueing for activities related to improving balance, coordination, kinesthetic sense, posture, motor skill, proprioception to assist with   [] LE / lumbar: LE, proximal hip, and core control in self care, mobility, lifting, ambulation and eccentric single leg control. [] UE / cervical: cervical, scapular, scapulothoracic and UE control with self care, reaching, carrying, lifting, house/yardwork, driving, computer work.   [] (00709) Therapist is in constant attendance of 2 or more patients providing skilled therapy interventions, but not providing any significant amount of measurable one-on-one time to either patient, for improvements in  [] LE / lumbar: LE, proximal hip, and core control in self care, mobility, lifting, ambulation and eccentric single leg control. [] UE / cervical: cervical, scapular, scapulothoracic and UE control with self care, reaching, carrying, lifting, house/yardwork, driving, computer work.      NMR and Therapeutic Activities:    [] (32162 or 17174) Provided verbal/tactile cueing for activities related to improving balance, coordination, kinesthetic sense, posture, motor skill, proprioception and motor activation to allow for proper function of   [] LE: / Lumbar core, proximal hip and LE with self care and ADLs  [] UE / Cervical: cervical, postural, scapular, scapulothoracic and UE control with self care, carrying, lifting, driving, computer work.   [] (30429) Gait Re-education- Provided training and instruction to the patient for proper LE, core and proximal hip recruitment and positioning and eccentric body weight control with ambulation re-education including up and down stairs     Home Management Training / Self Care:  [] (10668) Provided self-care/home management training related to activities of daily living and compensatory training, and/or use of adaptive equipment for improvement with: ADLs and compensatory training, meal preparation, safety procedures and instruction in use of adaptive equipment, including bathing, grooming, dressing, personal hygiene, basic household cleaning and chores.      Home Exercise Program:    [x] (02633) Reviewed/Progressed HEP activities related to strengthening, flexibility, endurance, ROM of   [] LE / Lumbar: core, proximal hip and LE for functional self-care, mobility, lifting and ambulation/stair navigation   [x] UE / Cervical: cervical, postural, scapular, scapulothoracic and UE control with self care, reaching, carrying, lifting, house/yardwork, driving, computer work  [] (02891)Reviewed/Progressed HEP activities related to improving balance, coordination, kinesthetic sense, posture, motor skill, proprioception of   [] LE: core, proximal hip and LE for self care, mobility, lifting, and ambulation/stair navigation    [] UE / Cervical: cervical, postural,  scapular, scapulothoracic and UE control with self care, reaching, carrying, lifting, house/yardwork, driving, computer work    Manual Treatments:  PROM / STM / Oscillations-Mobs:  G-I, II, III, IV (PA's, Inf., Post.)  [] (50714) Provided manual therapy to mobilize LE, proximal hip and/or LS spine soft tissue/joints for the purpose of modulating pain, promoting relaxation,  increasing ROM, reducing/eliminating soft tissue swelling/inflammation/restriction, improving soft tissue extensibility and allowing for proper ROM for normal function with   [] LE / lumbar: self care, mobility, lifting and ambulation. [] UE / Cervical: self care, reaching, carrying, lifting, house/yardwork, driving, computer work. Modalities:  [] (16265) Vasopneumatic compression: Utilized vasopneumatic compression to decrease edema / swelling for the purpose of improving mobility and quad tone / recruitment which will allow for increased overall function including but not limited to self-care, transfers, ambulation, and ascending / descending stairs. Charges:  Timed Code Treatment Minutes: 12   Total Treatment Minutes: 44     [x] EVAL - LOW (36831)   [] EVAL - MOD (58825)  [] EVAL - HIGH (57318)  [] RE-EVAL (44076)  [x] KL(93131) x 1      [] Ionto  [] NMR (51058) x       [] Vaso  [] Manual (19382) x       [] Ultrasound  [] TA x        [] Mech Traction (32100)  [] Aquatic Therapy x     [] ES (un) (43353):   [] Home Management Training x  [] ES(attended) (00916)   [] Dry Needling 1-2 muscles (28138):  [] Dry Needling 3+ muscles (484464)  [] Group:      [] Other:     GOALS:   Patient stated goal: \" increased use of LUE\"  []? Progressing: []? Met: []? Not Met: []? Adjusted     Therapist goals for Patient:   Short Term Goals: To be achieved in: 2 weeks  1. Independent in HEP and progression per patient tolerance, in order to prevent re-injury. []? Progressing: []? Met: []? Not Met: []? Adjusted  2. Patient will have a decrease in pain to 0/10 of LUE to facilitate improvement in movement, function, and ADLs as indicated by QuickDASH. []? Progressing: []? Met: []?  Not Met: []? Adjusted     Long Term Goals: To be achieved in: 5 weeks  1. Disability index score of 35% or less for the Quick DASH to assist with reaching prior pain-free ADLS. []? Progressing: []? Met: []? Not Met: []? Adjusted  2. Patient will demonstrate increased AROM to BUE in flexionand abduction by 10 deg in order to reach [place] with less pain. []? Progressing: []? Met: []? Not Met: []? Adjusted  3. Patient will demonstrate an increase in Strength to 5 in BUE in order to be able to put dishes away and carry groceries with more ease and without increase pain or symptoms. []? Progressing: []? Met: []? Not Met: []? Adjusted  4. Patient will be able to wash her back without increased pain or reproduction of symptoms in   []? Progressing: []? Met: []? Not Met: []? Adjusted  Overall Progression Towards Functional goals/ Treatment Progress Update:  [] Patient is progressing as expected towards functional goals listed. [] Progression is slowed due to complexities/Impairments listed. [] Progression has been slowed due to co-morbidities. [x] Plan just implemented, too soon to assess goals progression <30days   [] Goals require adjustment due to lack of progress  [] Patient is not progressing as expected and requires additional follow up with physician  [] Other    Persisting Functional Limitations/Impairments:  [x]Sleeping [x]Sitting               [x]Standing []Transfers        []Walking []Kneeling               []Stairs []Squatting / bending   [x]ADLs [x]Reaching  [x]Lifting  [x]Housework  []Driving []Job related tasks  []Sports/Recreation []Other:        ASSESSMENT:  See eval  Treatment/Activity Tolerance:  [x] Patient able to complete tx [] Patient limited by fatigue  [x] Patient limited by pain  [] Patient limited by other medical complications  [] Other:     Prognosis: [x] Good [] Fair  [] Poor    Patient Requires Follow-up: [x] Yes  [] No    Plan for next treatment session:    PLAN: See eval. PT 2x / week for 5 weeks. [] Continue per plan of care [] Alter current plan (see comments)  [x] Plan of care initiated [] Hold pending MD visit [] Discharge    Electronically signed by: Dylan Dunlap, PT, DPT    Note: If patient does not return for scheduled/ recommended follow up visits, this note will serve as a discharge from care along with most recent update on progress.
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
## 2022-02-17 ENCOUNTER — HOSPITAL ENCOUNTER (OUTPATIENT)
Dept: PHYSICAL THERAPY | Age: 57
Setting detail: THERAPIES SERIES
Discharge: HOME | End: 2022-02-17
Payer: COMMERCIAL

## 2022-02-17 PROCEDURE — 97140 MANUAL THERAPY 1/> REGIONS: CPT

## 2022-02-17 PROCEDURE — 97110 THERAPEUTIC EXERCISES: CPT

## 2022-02-17 NOTE — FLOWSHEET NOTE
[address] [facility]  Phone: [phone]   Fax: [phone]    Physical Therapy Daily Treatment Note  Date:  2022    Patient Name:  Huyen Sprague    :  1965  MRN: [id number]  Medical/Treatment Diagnosis Information:  Diagnosis: H15.282 (ICD-10-CM) - Acute pain of left shoulder  Treatment Diagnosis: Postural dysnfucntion, decreased strength and AROM  Insurance/Certification information:  PT Insurance Information: [facility]  Physician Information:  Referring Practitioner: BARRY Mancera  Plan of care signed (Y/N): []  Yes [x]  No     Date of Patient follow up with Physician:      Progress Report: []  Yes  [x]  No     Date Range for reporting period:  Beginnin22  Ending:     Progress report due (10 Rx/or 30 days whichever is less): visit #10 or 4/65/15 (date)     Recertification due (POC duration/ or 90 days whichever is less): visit #10 or 22 (date)     Visit # Insurance Allowable Auth required?  Date Range   2/10 30-soft max []  Yes  [x]  No -       Latex Allergy:  [x]NO      []YES  Preferred Language for Healthcare:   [x]English       []other:    Functional Scale:         Date assessed:  QuickDASH: raw score = 55 dysfunction = 100%   22    Pain level:  5/10     SUBJECTIVE:      OBJECTIVE: See eval      RESTRICTIONS/PRECAUTIONS:     Exercises/Interventions:     Therapeutic Exercises (65732) Resistance / level Sets/sec Reps Notes   Arm bike-if alix  2 min/2 min     pullies  1 20 ea    Lat pull down  High row  Mid row   IR  ER [place]  Denver  Denver   Orange  Denver 2  2  2  1  1 10  10  10  10 B  10 B    Shoulder ER  2 10 HEP   scap squeeze  10 x 5 sec  HEP   SL abd, flex       Wall slide                     Therapeutic Activities (35862)       Shelf taps                                   Neuromuscular Re-ed (28314)       Median nv glide        IR strech  10 x 10 sec B Manual Intervention (71434)       Gentle traction LUE  X 4 min     Shoulder PROM LUE  X 4 min                                     Modalities:     Pt. Education:  -patient educated on diagnosis, prognosis and expectations for rehab  -all patient questions were answered    Home Exercise Program:  2/14/22:  Access Code: [facility]  URL: [email or web address]  Date: 02/14/2022  Prepared by: Hamilton Ball    Exercises  Shoulder External Rotation and Scapular Retraction with Resistance - 2 x daily - 7 x weekly - 2 sets - 10 reps  Seated Scapular Retraction - 2 x daily - 7 x weekly - 1 sets - 10 reps - 5 sec hold        Therapeutic Exercise and NMR EXR  [x] (48621) Provided verbal/tactile cueing for activities related to strengthening, flexibility, endurance, ROM for improvements in  [] LE / Lumbar: LE, proximal hip, and core control with self care, mobility, lifting, ambulation. [x] UE / Cervical: cervical, postural, scapular, scapulothoracic and UE control with self care, reaching, carrying, lifting, house/yardwork, driving, computer work.  [] (89148) Provided verbal/tactile cueing for activities related to improving balance, coordination, kinesthetic sense, posture, motor skill, proprioception to assist with   [] LE / lumbar: LE, proximal hip, and core control in self care, mobility, lifting, ambulation and eccentric single leg control. [] UE / cervical: cervical, scapular, scapulothoracic and UE control with self care, reaching, carrying, lifting, house/yardwork, driving, computer work.   [] (94029) Therapist is in constant attendance of 2 or more patients providing skilled therapy interventions, but not providing any significant amount of measurable one-on-one time to either patient, for improvements in  [] LE / lumbar: LE, proximal hip, and core control in self care, mobility, lifting, ambulation and eccentric single leg control.    [] UE / cervical: cervical, scapular, scapulothoracic and UE control with self care, reaching, carrying, lifting, house/yardwork, driving, computer work. NMR and Therapeutic Activities:    [] (62916 or 85063) Provided verbal/tactile cueing for activities related to improving balance, coordination, kinesthetic sense, posture, motor skill, proprioception and motor activation to allow for proper function of   [] LE: / Lumbar core, proximal hip and LE with self care and ADLs  [] UE / Cervical: cervical, postural, scapular, scapulothoracic and UE control with self care, carrying, lifting, driving, computer work.   [] (19273) Gait Re-education- Provided training and instruction to the patient for proper LE, core and proximal hip recruitment and positioning and eccentric body weight control with ambulation re-education including up and down stairs     Home Management Training / Self Care:  [] (49857) Provided self-care/home management training related to activities of daily living and compensatory training, and/or use of adaptive equipment for improvement with: ADLs and compensatory training, meal preparation, safety procedures and instruction in use of adaptive equipment, including bathing, grooming, dressing, personal hygiene, basic household cleaning and chores.      Home Exercise Program:    [] (40098) Reviewed/Progressed HEP activities related to strengthening, flexibility, endurance, ROM of   [] LE / Lumbar: core, proximal hip and LE for functional self-care, mobility, lifting and ambulation/stair navigation   [] UE / Cervical: cervical, postural, scapular, scapulothoracic and UE control with self care, reaching, carrying, lifting, house/yardwork, driving, computer work  [] (84693)Reviewed/Progressed HEP activities related to improving balance, coordination, kinesthetic sense, posture, motor skill, proprioception of   [] LE: core, proximal hip and LE for self care, mobility, lifting, and ambulation/stair navigation    [] UE / Cervical: cervical, postural,  scapular, scapulothoracic and UE control with self care, reaching, carrying, lifting, house/yardwork, driving, computer work    Manual Treatments:  PROM / STM / Oscillations-Mobs:  G-I, II, III, IV (PA's, Inf., Post.)  [x] (23029) Provided manual therapy to mobilize LE, proximal hip and/or LS spine soft tissue/joints for the purpose of modulating pain, promoting relaxation,  increasing ROM, reducing/eliminating soft tissue swelling/inflammation/restriction, improving soft tissue extensibility and allowing for proper ROM for normal function with   [] LE / lumbar: self care, mobility, lifting and ambulation. [x] UE / Cervical: self care, reaching, carrying, lifting, house/yardwork, driving, computer work. Modalities:  [] (58119) Vasopneumatic compression: Utilized vasopneumatic compression to decrease edema / swelling for the purpose of improving mobility and quad tone / recruitment which will allow for increased overall function including but not limited to self-care, transfers, ambulation, and ascending / descending stairs. Charges:  Timed Code Treatment Minutes: 43   Total Treatment Minutes: 43     [] EVAL - LOW (58855)   [] EVAL - MOD (69686)  [] EVAL - HIGH (14529)  [] RE-EVAL (67898)  [x] TX(00273) x 2      [] Ionto  [] NMR (38877) x       [] Vaso  [x] Manual (91836) x 1      [] Ultrasound  [] TA x        [] Mech Traction (23137)  [] Aquatic Therapy x      [] ES (un) (21231):   [] Home Management Training x  [] ES(attended) (36935)   [] Dry Needling 1-2 muscles (93400):  [] Dry Needling 3+ muscles (710359)  [] Group:      [] Other:     GOALS:   Patient stated goal: \" increased use of LUE\"  []? Progressing: []? Met: []? Not Met: []? Adjusted     Therapist goals for Patient:   Short Term Goals: To be achieved in: 2 weeks  1. Independent in HEP and progression per patient tolerance, in order to prevent re-injury. []? Progressing: []? Met: []? Not Met: []? Adjusted  2.  Patient will have a decrease in pain to 0/10 of LUE to facilitate improvement in movement, function, and ADLs as indicated by QuickDASH. []? Progressing: []? Met: []? Not Met: []? Adjusted     Long Term Goals: To be achieved in: 5 weeks  1. Disability index score of 35% or less for the Quick DASH to assist with reaching prior pain-free ADLS. []? Progressing: []? Met: []? Not Met: []? Adjusted  2. Patient will demonstrate increased AROM to BUE in flexionand abduction by 10 deg in order to reach [place] with less pain. []? Progressing: []? Met: []? Not Met: []? Adjusted  3. Patient will demonstrate an increase in Strength to 5 in BUE in order to be able to put dishes away and carry groceries with more ease and without increase pain or symptoms. []? Progressing: []? Met: []? Not Met: []? Adjusted  4. Patient will be able to wash her back without increased pain or reproduction of symptoms in   []? Progressing: []? Met: []? Not Met: []? Adjusted    Overall Progression Towards Functional goals/ Treatment Progress Update:  [] Patient is progressing as expected towards functional goals listed. [] Progression is slowed due to complexities/Impairments listed. [] Progression has been slowed due to co-morbidities. [x] Plan just implemented, too soon to assess goals progression <30days   [] Goals require adjustment due to lack of progress  [] Patient is not progressing as expected and requires additional follow up with physician  [] Other    Persisting Functional Limitations/Impairments:  [x]Sleeping [x]Sitting               [x]Standing []Transfers        []Walking []Kneeling               []Stairs []Squatting / bending   [x]ADLs [x]Reaching  [x]Lifting  [x]Housework  []Driving []Job related tasks  []Sports/Recreation []Other:        ASSESSMENT:  Focused on beginning B shoulder strengthening this date with band exercises. Pt tolerated banded exercises well, increased R shoulder pain with ER exercise.  Stretched gently into IR with strap due to limited AROM; felt dull ache after stretching. Plan to continue to strengthen and improve BUE AROM in order to return to pain-free functional mobility. Treatment/Activity Tolerance:  [x] Patient able to complete tx [] Patient limited by fatigue  [x] Patient limited by pain  [] Patient limited by other medical complications  [] Other:     Prognosis: [x] Good [] Fair  [] Poor    Patient Requires Follow-up: [x] Yes  [] No    Plan for next treatment session:    PLAN: See eval. PT 2x / week for 5 weeks. [] Continue per plan of care [] Alter current plan (see comments)  [x] Plan of care initiated [] Hold pending MD visit [] Discharge    Electronically signed by: Gamaliel Armstrong PT, DPT    Note: If patient does not return for scheduled/ recommended follow up visits, this note will serve as a discharge from care along with most recent update on progress.

## 2022-02-22 ENCOUNTER — HOSPITAL ENCOUNTER (OUTPATIENT)
Dept: PHYSICAL THERAPY | Age: 57
Setting detail: THERAPIES SERIES
Discharge: HOME | End: 2022-02-22
Payer: COMMERCIAL

## 2022-02-22 PROCEDURE — 97110 THERAPEUTIC EXERCISES: CPT

## 2022-02-22 PROCEDURE — 97140 MANUAL THERAPY 1/> REGIONS: CPT

## 2022-02-22 NOTE — FLOWSHEET NOTE
[address] [facility] Physical Therapy  Phone: [phone]   Fax: [phone]    Physical Therapy Daily Treatment Note  Date:  2022    Patient Name:  Pa Gage    :  1965  MRN: [id number]  Medical/Treatment Diagnosis Information:  Diagnosis: H83.593 (ICD-10-CM) - Acute pain of left shoulder  Treatment Diagnosis: Postural dysnfucntion, decreased strength and AROM  Insurance/Certification information:  PT Insurance Information: [facility] Plan  Physician Information:  Referring Practitioner: BARRY Whitehead  Plan of care signed (Y/N): []  Yes [x]  No     Date of Patient follow up with Physician:      Progress Report: []  Yes  [x]  No     Date Range for reporting period:  Beginnin22  Ending:     Progress report due (10 Rx/or 30 days whichever is less): visit #10 or  (date)     Recertification due (POC duration/ or 90 days whichever is less): visit #10 or 22 (date)     Visit # Insurance Allowable Auth required? Date Range   3/10 30-soft max []  Yes  [x]  No -       Latex Allergy:  [x]NO      []YES  Preferred Language for Healthcare:   [x]English       []other:    Functional Scale:         Date assessed:  QuickDASH: raw score = 55 dysfunction = 100%   22    Pain level:  5/10     SUBJECTIVE:  Reports that pain was 10/10 yesterday in both shoulders, however today it is less.      OBJECTIVE: See eval      RESTRICTIONS/PRECAUTIONS:     Exercises/Interventions:     Therapeutic Exercises (74218) Resistance / level Sets/sec Reps Notes   Arm bike-if alix  2 min/2 min     pullies  1 20 ea    Lat pull down  High row  Mid row   IR  ER [facility] 2  2  2  1  1 10  10  10  10 B  10 B    Shoulder ER  2 10 HEP   scap squeeze  10 x 5 sec  HEP   SL abd, flex       Wall slide       SB table   -fwd/bwd, L/R, CW/CCW    1   20 ea B           Therapeutic Activities (27516)       Shelf taps Neuromuscular Re-ed (83059)       Median nv glide        IR strech                                  Manual Intervention (29132)       Gentle traction LUE  X 4 min     Shoulder PROM LUE  X 4 min                                     Modalities:     Pt. Education:  -patient educated on diagnosis, prognosis and expectations for rehab  -all patient questions were answered    Home Exercise Program:  2/14/22:  Access Code: [facility]  URL: [email or web address]  Date: 02/14/2022  Prepared by: Hamilton Ball    Exercises  Shoulder External Rotation and Scapular Retraction with Resistance - 2 x daily - 7 x weekly - 2 sets - 10 reps  Seated Scapular Retraction - 2 x daily - 7 x weekly - 1 sets - 10 reps - 5 sec hold        Therapeutic Exercise and NMR EXR  [x] (70065) Provided verbal/tactile cueing for activities related to strengthening, flexibility, endurance, ROM for improvements in  [] LE / Lumbar: LE, proximal hip, and core control with self care, mobility, lifting, ambulation. [x] UE / Cervical: cervical, postural, scapular, scapulothoracic and UE control with self care, reaching, carrying, lifting, house/yardwork, driving, computer work.  [] (76295) Provided verbal/tactile cueing for activities related to improving balance, coordination, kinesthetic sense, posture, motor skill, proprioception to assist with   [] LE / lumbar: LE, proximal hip, and core control in self care, mobility, lifting, ambulation and eccentric single leg control.    [] UE / cervical: cervical, scapular, scapulothoracic and UE control with self care, reaching, carrying, lifting, house/yardwork, driving, computer work.   [] (24500) Therapist is in constant attendance of 2 or more patients providing skilled therapy interventions, but not providing any significant amount of measurable one-on-one time to either patient, for improvements in  [] LE / lumbar: LE, proximal hip, and core control in self care, mobility, lifting, ambulation and eccentric single leg control. [] UE / cervical: cervical, scapular, scapulothoracic and UE control with self care, reaching, carrying, lifting, house/yardwork, driving, computer work. NMR and Therapeutic Activities:    [] (11609 or 06166) Provided verbal/tactile cueing for activities related to improving balance, coordination, kinesthetic sense, posture, motor skill, proprioception and motor activation to allow for proper function of   [] LE: / Lumbar core, proximal hip and LE with self care and ADLs  [] UE / Cervical: cervical, postural, scapular, scapulothoracic and UE control with self care, carrying, lifting, driving, computer work.   [] (43147) Gait Re-education- Provided training and instruction to the patient for proper LE, core and proximal hip recruitment and positioning and eccentric body weight control with ambulation re-education including up and down stairs     Home Management Training / Self Care:  [] (43624) Provided self-care/home management training related to activities of daily living and compensatory training, and/or use of adaptive equipment for improvement with: ADLs and compensatory training, meal preparation, safety procedures and instruction in use of adaptive equipment, including bathing, grooming, dressing, personal hygiene, basic household cleaning and chores.      Home Exercise Program:    [] (46455) Reviewed/Progressed HEP activities related to strengthening, flexibility, endurance, ROM of   [] LE / Lumbar: core, proximal hip and LE for functional self-care, mobility, lifting and ambulation/stair navigation   [] UE / Cervical: cervical, postural, scapular, scapulothoracic and UE control with self care, reaching, carrying, lifting, house/yardwork, driving, computer work  [] (15453)Reviewed/Progressed HEP activities related to improving balance, coordination, kinesthetic sense, posture, motor skill, proprioception of   [] LE: core, proximal hip and LE for self care, mobility, lifting, and ambulation/stair navigation    [] UE / Cervical: cervical, postural,  scapular, scapulothoracic and UE control with self care, reaching, carrying, lifting, house/yardwork, driving, computer work    Manual Treatments:  PROM / STM / Oscillations-Mobs:  G-I, II, III, IV (PA's, Inf., Post.)  [x] (27437) Provided manual therapy to mobilize LE, proximal hip and/or LS spine soft tissue/joints for the purpose of modulating pain, promoting relaxation,  increasing ROM, reducing/eliminating soft tissue swelling/inflammation/restriction, improving soft tissue extensibility and allowing for proper ROM for normal function with   [] LE / lumbar: self care, mobility, lifting and ambulation. [x] UE / Cervical: self care, reaching, carrying, lifting, house/yardwork, driving, computer work. Modalities:  [] (00499) Vasopneumatic compression: Utilized vasopneumatic compression to decrease edema / swelling for the purpose of improving mobility and quad tone / recruitment which will allow for increased overall function including but not limited to self-care, transfers, ambulation, and ascending / descending stairs. Charges:  Timed Code Treatment Minutes: 40   Total Treatment Minutes: 40     [] EVAL - LOW (77196)   [] EVAL - MOD (45471)  [] EVAL - HIGH (20133)  [] RE-EVAL (51319)  [x] CM(51949) x 2      [] Ionto  [] NMR (87601) x       [] Vaso  [x] Manual (85025) x 1      [] Ultrasound  [] TA x        [] Mech Traction (94083)  [] Aquatic Therapy x      [] ES (un) (97390):   [] Home Management Training x  [] ES(attended) (83988)   [] Dry Needling 1-2 muscles (69512):  [] Dry Needling 3+ muscles (090186)  [] Group:      [] Other:     GOALS:   Patient stated goal: \" increased use of LUE\"  []? Progressing: []? Met: []? Not Met: []? Adjusted     Therapist goals for Patient:   Short Term Goals: To be achieved in: 2 weeks  1.  Independent in HEP and progression per patient tolerance, in order to prevent re-injury. []? Progressing: []? Met: []? Not Met: []? Adjusted  2. Patient will have a decrease in pain to 0/10 of LUE to facilitate improvement in movement, function, and ADLs as indicated by QuickDASH. []? Progressing: []? Met: []? Not Met: []? Adjusted     Long Term Goals: To be achieved in: 5 weeks  1. Disability index score of 35% or less for the Quick DASH to assist with reaching prior pain-free ADLS. []? Progressing: []? Met: []? Not Met: []? Adjusted  2. Patient will demonstrate increased AROM to BUE in flexionand abduction by 10 deg in order to reach [place] with less pain. []? Progressing: []? Met: []? Not Met: []? Adjusted  3. Patient will demonstrate an increase in Strength to 5 in BUE in order to be able to put dishes away and carry groceries with more ease and without increase pain or symptoms. []? Progressing: []? Met: []? Not Met: []? Adjusted  4. Patient will be able to wash her back without increased pain or reproduction of symptoms in   []? Progressing: []? Met: []? Not Met: []? Adjusted    Overall Progression Towards Functional goals/ Treatment Progress Update:  [] Patient is progressing as expected towards functional goals listed. [] Progression is slowed due to complexities/Impairments listed. [] Progression has been slowed due to co-morbidities. [x] Plan just implemented, too soon to assess goals progression <30days   [] Goals require adjustment due to lack of progress  [] Patient is not progressing as expected and requires additional follow up with physician  [] Other    Persisting Functional Limitations/Impairments:  [x]Sleeping [x]Sitting               [x]Standing []Transfers        []Walking []Kneeling               []Stairs []Squatting / bending   [x]ADLs [x]Reaching  [x]Lifting  [x]Housework  []Driving []Job related tasks  []Sports/Recreation []Other:        ASSESSMENT:  Tolerated all exercise without increasing her B shoulder pain this date.  Continuing to strengthen in pain-free AROM. Added table SB in all directions for B shoulders. Plan to continue to strengthen and improve BUE AROM in order to return to pain-free functional mobility. Treatment/Activity Tolerance:  [x] Patient able to complete tx [] Patient limited by fatigue  [x] Patient limited by pain  [] Patient limited by other medical complications  [] Other:     Prognosis: [x] Good [] Fair  [] Poor    Patient Requires Follow-up: [x] Yes  [] No    Plan for next treatment session:    PLAN: See eval. PT 2x / week for 5 weeks. [] Continue per plan of care [] Alter current plan (see comments)  [x] Plan of care initiated [] Hold pending MD visit [] Discharge    Electronically signed by: Tori Delgado PT, DPT    Note: If patient does not return for scheduled/ recommended follow up visits, this note will serve as a discharge from care along with most recent update on progress.

## 2022-02-24 ENCOUNTER — HOSPITAL ENCOUNTER (OUTPATIENT)
Dept: PHYSICAL THERAPY | Age: 57
Setting detail: THERAPIES SERIES
Discharge: HOME | End: 2022-02-24
Payer: COMMERCIAL

## 2022-02-24 PROCEDURE — 97140 MANUAL THERAPY 1/> REGIONS: CPT

## 2022-02-24 PROCEDURE — 97110 THERAPEUTIC EXERCISES: CPT

## 2022-02-24 NOTE — FLOWSHEET NOTE
[address] [facility] Physical Therapy  Phone: [phone]   Fax: [phone]    Physical Therapy Daily Treatment Note  Date:  2022    Patient Name:  Yolande Alonso    :  1965  MRN: [id number]  Medical/Treatment Diagnosis Information:  Diagnosis: B64.531 (ICD-10-CM) - Acute pain of left shoulder  Treatment Diagnosis: Postural dysnfucntion, decreased strength and AROM  Insurance/Certification information:  PT Insurance Information: [employer] Plan  Physician Information:  Referring Practitioner: BARRY Barber  Plan of care signed (Y/N): []  Yes [x]  No     Date of Patient follow up with Physician:      Progress Report: []  Yes  [x]  No     Date Range for reporting period:  Beginnin22  Ending:     Progress report due (10 Rx/or 30 days whichever is less): visit #10 or 86 (date)     Recertification due (POC duration/ or 90 days whichever is less): visit #10 or 22 (date)     Visit # Insurance Allowable Auth required? Date Range   4/10 30-soft max []  Yes  [x]  No -       Latex Allergy:  [x]NO      []YES  Preferred Language for Healthcare:   [x]English       []other:    Functional Scale:         Date assessed:  QuickDASH: raw score = 55 dysfunction = 100%   22    Pain level:  5/10     SUBJECTIVE:  Pt states that her pain is low today. Continues to feel pain in shoulders when reaching [place].      OBJECTIVE: See eval      RESTRICTIONS/PRECAUTIONS:     Exercises/Interventions:     Therapeutic Exercises (34420) Resistance / level Sets/sec Reps Notes   Arm bike-if alix  2 min/2 min     pullies  1 20 ea    Lat pull down  High row  Mid row   IR  ER [facility] 2  2  2  1  1 10  10  10  10 B  10 B    Shoulder ER  2 10 HEP   scap squeeze    HEP   SL abd, flex       Wall slide       SB table   -fwd/bwd, L/R, CW/CCW    1   20 ea B    Doorway flexion  10 x 5 sec      Doorway biceps stretch  4 x 15 sec     SB flexion stretch on table  10 x 10 sec     Supine flexion  10 x 5 sec            Therapeutic Activities (57218)       Shelf taps                                   Neuromuscular Re-ed (69932)       Median nv glide        IR strech                                  Manual Intervention (66296)       Gentle traction LUE  X 4 min     Shoulder PROM LUE  X 4 min                                     Modalities:   2/24 Pt supine with hot pack on neck/shoulders for 10 min to decrease muscle tightness and pain. Pt. Education:  -patient educated on diagnosis, prognosis and expectations for rehab  -all patient questions were answered    Home Exercise Program:  2/14/22:  Access Code: [facility]  URL: [email or web address]  Date: 02/14/2022  Prepared by: Hamilton Ball    Exercises  Shoulder External Rotation and Scapular Retraction with Resistance - 2 x daily - 7 x weekly - 2 sets - 10 reps  Seated Scapular Retraction - 2 x daily - 7 x weekly - 1 sets - 10 reps - 5 sec hold        Therapeutic Exercise and NMR EXR  [x] (65714) Provided verbal/tactile cueing for activities related to strengthening, flexibility, endurance, ROM for improvements in  [] LE / Lumbar: LE, proximal hip, and core control with self care, mobility, lifting, ambulation. [x] UE / Cervical: cervical, postural, scapular, scapulothoracic and UE control with self care, reaching, carrying, lifting, house/yardwork, driving, computer work.  [] (26175) Provided verbal/tactile cueing for activities related to improving balance, coordination, kinesthetic sense, posture, motor skill, proprioception to assist with   [] LE / lumbar: LE, proximal hip, and core control in self care, mobility, lifting, ambulation and eccentric single leg control.    [] UE / cervical: cervical, scapular, scapulothoracic and UE control with self care, reaching, carrying, lifting, house/yardwork, driving, computer work.   [] (55281) Therapist is in constant attendance of 2 or more patients providing skilled therapy interventions, but not providing any significant amount of measurable one-on-one time to either patient, for improvements in  [] LE / lumbar: LE, proximal hip, and core control in self care, mobility, lifting, ambulation and eccentric single leg control. [] UE / cervical: cervical, scapular, scapulothoracic and UE control with self care, reaching, carrying, lifting, house/yardwork, driving, computer work. NMR and Therapeutic Activities:    [] (57946 or 33718) Provided verbal/tactile cueing for activities related to improving balance, coordination, kinesthetic sense, posture, motor skill, proprioception and motor activation to allow for proper function of   [] LE: / Lumbar core, proximal hip and LE with self care and ADLs  [] UE / Cervical: cervical, postural, scapular, scapulothoracic and UE control with self care, carrying, lifting, driving, computer work.   [] (65630) Gait Re-education- Provided training and instruction to the patient for proper LE, core and proximal hip recruitment and positioning and eccentric body weight control with ambulation re-education including up and down stairs     Home Management Training / Self Care:  [] (32418) Provided self-care/home management training related to activities of daily living and compensatory training, and/or use of adaptive equipment for improvement with: ADLs and compensatory training, meal preparation, safety procedures and instruction in use of adaptive equipment, including bathing, grooming, dressing, personal hygiene, basic household cleaning and chores.      Home Exercise Program:    [] (87293) Reviewed/Progressed HEP activities related to strengthening, flexibility, endurance, ROM of   [] LE / Lumbar: core, proximal hip and LE for functional self-care, mobility, lifting and ambulation/stair navigation   [] UE / Cervical: cervical, postural, scapular, scapulothoracic and UE control with self care, reaching, carrying, lifting, house/yardwork, driving, computer work  [] (15078)Reviewed/Progressed HEP activities related to improving balance, coordination, kinesthetic sense, posture, motor skill, proprioception of   [] LE: core, proximal hip and LE for self care, mobility, lifting, and ambulation/stair navigation    [] UE / Cervical: cervical, postural,  scapular, scapulothoracic and UE control with self care, reaching, carrying, lifting, house/yardwork, driving, computer work    Manual Treatments:  PROM / STM / Oscillations-Mobs:  G-I, II, III, IV (PA's, Inf., Post.)  [x] (56338) Provided manual therapy to mobilize LE, proximal hip and/or LS spine soft tissue/joints for the purpose of modulating pain, promoting relaxation,  increasing ROM, reducing/eliminating soft tissue swelling/inflammation/restriction, improving soft tissue extensibility and allowing for proper ROM for normal function with   [] LE / lumbar: self care, mobility, lifting and ambulation. [x] UE / Cervical: self care, reaching, carrying, lifting, house/yardwork, driving, computer work. Modalities:  [] (26290) Vasopneumatic compression: Utilized vasopneumatic compression to decrease edema / swelling for the purpose of improving mobility and quad tone / recruitment which will allow for increased overall function including but not limited to self-care, transfers, ambulation, and ascending / descending stairs.        Charges:  Timed Code Treatment Minutes: 45   Total Treatment Minutes: 55     [] EVAL - LOW (66791)   [] EVAL - MOD (82082)  [] EVAL - HIGH (03560)  [] RE-EVAL (90350)  [x] BA(63414) x 2      [] Ionto  [] NMR (83561) x       [] Vaso  [x] Manual (00927) x 1      [] Ultrasound  [] TA x        [] Mech Traction (67586)  [] Aquatic Therapy x      [] ES (un) (55152):   [] Home Management Training x  [] ES(attended) (06274)   [] Dry Needling 1-2 muscles (88033):  [] Dry Needling 3+ muscles (988860)  [] Group:      [] Other:     GOALS:   Patient stated goal: \" increased use of LUE\"  []? Progressing: []? Met: []? Not Met: []? Adjusted     Therapist goals for Patient:   Short Term Goals: To be achieved in: 2 weeks  1. Independent in HEP and progression per patient tolerance, in order to prevent re-injury. []? Progressing: []? Met: []? Not Met: []? Adjusted  2. Patient will have a decrease in pain to 0/10 of LUE to facilitate improvement in movement, function, and ADLs as indicated by QuickDASH. []? Progressing: []? Met: []? Not Met: []? Adjusted     Long Term Goals: To be achieved in: 5 weeks  1. Disability index score of 35% or less for the Quick DASH to assist with reaching prior pain-free ADLS. []? Progressing: []? Met: []? Not Met: []? Adjusted  2. Patient will demonstrate increased AROM to BUE in flexionand abduction by 10 deg in order to reach [place] with less pain. []? Progressing: []? Met: []? Not Met: []? Adjusted  3. Patient will demonstrate an increase in Strength to 5 in BUE in order to be able to put dishes away and carry groceries with more ease and without increase pain or symptoms. []? Progressing: []? Met: []? Not Met: []? Adjusted  4. Patient will be able to wash her back without increased pain or reproduction of symptoms in   []? Progressing: []? Met: []? Not Met: []? Adjusted    Overall Progression Towards Functional goals/ Treatment Progress Update:  [] Patient is progressing as expected towards functional goals listed. [] Progression is slowed due to complexities/Impairments listed. [] Progression has been slowed due to co-morbidities.   [x] Plan just implemented, too soon to assess goals progression <30days   [] Goals require adjustment due to lack of progress  [] Patient is not progressing as expected and requires additional follow up with physician  [] Other    Persisting Functional Limitations/Impairments:  [x]Sleeping [x]Sitting               [x]Standing []Transfers        []Walking []Kneeling               []Stairs []Squatting / bending   [x]ADLs [x]Reaching  [x]Lifting  [x]Housework  []Driving []Job related tasks  []Sports/Recreation []Other:        ASSESSMENT:  Focused on OH mobility today and stretching B shoulders to increase OH flexion. Pt tolerated new exercises well. Continued to complete just 10 reps of band exercises today due to L shoulder feeling tired after arm bike and pullies. Complains of pain along L supraspinatus tendon and muscle. Provided Pt with orange band to complete banded exercises at home. Added doorway stretches this date as well to work on improving shoulder mobility due to increased muscle tightness. Ended with hot back on neck and shoulders to decrease muscle tightness and pain. Plan to continue to strengthen and improve BUE AROM in order to return to pain-free functional mobility. Treatment/Activity Tolerance:  [x] Patient able to complete tx  [] Patient limited by fatigue  [x] Patient limited by pain  [] Patient limited by other medical complications  [] Other:     Prognosis: [x] Good [] Fair  [] Poor    Patient Requires Follow-up: [x] Yes  [] No    Plan for next treatment session:    PLAN: See eval. PT 2x / week for 5 weeks. [] Continue per plan of care [] Alter current plan (see comments)  [x] Plan of care initiated [] Hold pending MD visit [] Discharge    Electronically signed by: Srinivas King PT, DPT    Note: If patient does not return for scheduled/ recommended follow up visits, this note will serve as a discharge from care along with most recent update on progress.

## 2022-02-28 ENCOUNTER — HOSPITAL ENCOUNTER (OUTPATIENT)
Dept: PHYSICAL THERAPY | Age: 57
Setting detail: THERAPIES SERIES
Discharge: HOME | End: 2022-02-28
Payer: COMMERCIAL

## 2022-02-28 ENCOUNTER — APPOINTMENT (OUTPATIENT)
Dept: GENERAL RADIOLOGY | Age: 57
End: 2022-02-28
Payer: COMMERCIAL

## 2022-02-28 ENCOUNTER — HOSPITAL ENCOUNTER (EMERGENCY)
Age: 57
Discharge: HOME | End: 2022-02-28
Payer: COMMERCIAL

## 2022-02-28 VITALS
RESPIRATION RATE: 18 BRPM | BODY MASS INDEX: 27.6 KG/M2 | SYSTOLIC BLOOD PRESSURE: 130 MMHG | DIASTOLIC BLOOD PRESSURE: 84 MMHG | WEIGHT: 150 LBS | TEMPERATURE: 99 F | OXYGEN SATURATION: 96 % | HEIGHT: 62 IN | HEART RATE: 86 BPM

## 2022-02-28 DIAGNOSIS — M25.562 ACUTE PAIN OF LEFT KNEE: Primary | ICD-10-CM

## 2022-02-28 PROCEDURE — 97140 MANUAL THERAPY 1/> REGIONS: CPT

## 2022-02-28 PROCEDURE — 97112 NEUROMUSCULAR REEDUCATION: CPT

## 2022-02-28 PROCEDURE — 97110 THERAPEUTIC EXERCISES: CPT

## 2022-02-28 PROCEDURE — 73562 X-RAY EXAM OF KNEE 3: CPT

## 2022-02-28 PROCEDURE — 99283 EMERGENCY DEPT VISIT LOW MDM: CPT

## 2022-02-28 PROCEDURE — 6370000000 HC RX 637 (ALT 250 FOR IP): Performed by: PHYSICIAN ASSISTANT

## 2022-02-28 RX ORDER — IBUPROFEN 600 MG/1
600 TABLET ORAL ONCE
Status: COMPLETED | OUTPATIENT
Start: 2022-02-28 | End: 2022-02-28

## 2022-02-28 RX ADMIN — IBUPROFEN 600 MG: 600 TABLET ORAL at 19:01

## 2022-02-28 ASSESSMENT — PAIN SCALES - GENERAL: PAINLEVEL_OUTOF10: 10

## 2022-02-28 ASSESSMENT — ENCOUNTER SYMPTOMS
NAUSEA: 0
RHINORRHEA: 0
SHORTNESS OF BREATH: 0
WHEEZING: 0
ABDOMINAL PAIN: 0
VOMITING: 0
COUGH: 0
DIARRHEA: 0

## 2022-02-28 ASSESSMENT — PAIN - FUNCTIONAL ASSESSMENT: PAIN_FUNCTIONAL_ASSESSMENT: 0-10

## 2022-02-28 NOTE — ED PROVIDER NOTES
[facility]        Pt Name: Patricia Bell  MRN: [id number]  [place] 1965  Date of evaluation: 2/28/2022  Provider: Leonor Perez PA-C  PCP: HOWARD Parmar CNP  Note Started: 6:59 PM EST       CARRIE. I have evaluated this patient. My supervising physician was available for consultation. CHIEF COMPLAINT       Chief Complaint   Patient presents with    Leg Pain      used during triage. Pt reports L leg pain x3 days. Pt states she was doing squats and then had pain on inside of knee       HISTORY OF PRESENT ILLNESS   (Location, Timing/Onset, Context/Setting, Quality, Duration, Modifying Factors, Severity, Associated Signs and Symptoms)  Note limiting factors. Chief Complaint: L knee     Patricai Bell is a 55 y.o. female who presents for evaluation of left knee pain over the past 3 days. Patient states that she was doing squats for physical therapy of low back pain when she started experiencing pain in the left knee. She denies any falls or other injury or trauma. No numbness tingling weakness distally. States that she has not had a click in the knee. She has been taking some sort of pain medication that she takes for chronic shoulder pain and this did not help. She has no other complaints or concerns at this time    Above information was obtained using a [facility]  #[id number]. Nursing Notes were all reviewed and agreed with or any disagreements were addressed in the HPI. REVIEW OF SYSTEMS    (2-9 systems for level 4, 10 or more for level 5)     Review of Systems   Constitutional: Negative for appetite change, chills and fever. HENT: Negative for congestion and rhinorrhea. Respiratory: Negative for cough, shortness of breath and wheezing. Cardiovascular: Negative for chest pain. Gastrointestinal: Negative for abdominal pain, diarrhea, nausea and vomiting.    Genitourinary: Negative for difficulty urinating, dysuria and hematuria. Musculoskeletal: Positive for arthralgias (L knee). Negative for neck pain and neck stiffness. Skin: Negative for rash. Neurological: Negative for weakness, numbness and headaches. Positives and Pertinent negatives as per HPI. Except as noted above in the ROS, all other systems were reviewed and negative. PAST MEDICAL HISTORY   History reviewed. No pertinent past medical history. SURGICAL HISTORY   History reviewed. No pertinent surgical history. CURRENTMEDICATIONS       Previous Medications    DICLOFENAC SODIUM (VOLTAREN) 1 % GEL    Apply 4 g topically 4 times daily as needed for Pain    METHYLPREDNISOLONE (MEDROL DOSEPACK) 4 MG TABLET    Take by mouth. ALLERGIES     Patient has no known allergies. FAMILYHISTORY       Family History   Problem Relation Age of Onset    Breast Cancer Sister           SOCIAL HISTORY       Social History     Tobacco Use    Smoking status: Never Smoker    Smokeless tobacco: Never Used   Vaping Use    Vaping Use: Never used   Substance Use Topics    Alcohol use: No    Drug use: Never       SCREENINGS    [place] Coma Scale  Eye Opening: Spontaneous  Best Verbal Response: Oriented  Best Motor Response: Obeys commands  [place] Coma Scale Score: 15        PHYSICAL EXAM    (up to 7 for level 4, 8 or more for level 5)     ED Triage Vitals [02/28/22 1839]   BP Temp Temp src Pulse Resp SpO2 Height Weight   130/84 99 °F (37.2 °C) -- 86 18 96 % 5' 2\" (1.575 m) 150 lb (68 kg)       Physical Exam  Vitals and nursing note reviewed. Constitutional:       Appearance: She is well-developed. She is not diaphoretic. HENT:      Head: Normocephalic and atraumatic. Right Ear: External ear normal.      Left Ear: External ear normal.      Nose: Nose normal.   Eyes:      General:         Right eye: No discharge. Left eye: No discharge. Cardiovascular:      Pulses: Normal pulses.    Pulmonary: Effort: Pulmonary effort is normal. No respiratory distress. Musculoskeletal:         General: Normal range of motion. Cervical back: Normal range of motion and neck supple. Left knee: Bony tenderness and crepitus present. No swelling, deformity or effusion. Normal range of motion. Legs:    Skin:     General: Skin is warm and dry. Neurological:      Mental Status: She is alert and oriented to person, place, and time. Sensory: Sensation is intact. Motor: Motor function is intact. Psychiatric:         Behavior: Behavior normal.         DIAGNOSTIC RESULTS   LABS:    Labs Reviewed - No data to display    When ordered only abnormal lab results are displayed. All other labs were within normal range or not returned as of this dictation. EKG: When ordered, EKG's are interpreted by the Emergency Department Physician in the absence of a cardiologist.  Please see their note for interpretation of EKG. RADIOLOGY:   Non-plain film images such as CT, Ultrasound and MRI are read by the radiologist. Plain radiographic images are visualized and preliminarily interpreted by the ED Provider with the below findings:        Interpretation per the Radiologist below, if available at the time of this note:    XR KNEE LEFT (3 VIEWS)   Final Result   No acute findings. No results found. PROCEDURES   Unless otherwise noted below, none     Procedures    CRITICAL CARE TIME       CONSULTS:  None      EMERGENCY DEPARTMENT COURSE and DIFFERENTIAL DIAGNOSIS/MDM:   Vitals:    Vitals:    02/28/22 1839   BP: 130/84   Pulse: 86   Resp: 18   Temp: 99 °F (37.2 °C)   SpO2: 96%   Weight: 150 lb (68 kg)   Height: 5' 2\" (1.575 m)       Patient was given the following medications:  Medications   ibuprofen (ADVIL;MOTRIN) tablet 600 mg (600 mg Oral Given 2/28/22 1901)           Patient presents for evaluation of left knee pain. On exam, she is resting comfortably in bed no acute distress and nontoxic. Vitals are stable and she is afebrile. She does have pain to the medial aspect of the left knee. There is some clicking with range of motion but range of motion is intact. There is no large effusion. She is neurovascular intact distally. Given Motrin for symptomatic relief and will be reevaluated. X-ray left knee shows no acute findings. Patient placed in Ace wrap. Symptomatic and supportive care discussed including rest, ice and elevation. She can take Tylenol and/or ibuprofen. Given orthopedic contact information for reevaluation within 1 week. I estimate there is LOW risk for COMPARTMENT SYNDROME, DEEP VENOUS THROMBOSIS, SEPTIC ARTHRITIS, TENDON OR NEUROVASCULAR INJURY, thus I consider the discharge disposition reasonable. Conditions return to the ED were discussed such as any new or worsening symptoms or signs neurovascular compromise, intractable pain or inability to ambulate. She is agreeable to this plan and stable for discharge at this time. FINAL IMPRESSION      1.  Acute pain of left knee          DISPOSITION/PLAN   DISPOSITION Decision To Discharge 02/28/2022 07:45:35 PM      PATIENT REFERRED TO:  Jayla Beckwith MD  [address] [place] [address]  [phone]    Schedule an appointment as soon as possible for a visit       [facility] Emergency Department  [address] [place] [address]  [phone]  Go to   If symptoms worsen      DISCHARGE MEDICATIONS:  New Prescriptions    No medications on file       DISCONTINUED MEDICATIONS:  Discontinued Medications    No medications on file              (Please note that portions of this note were completed with a voice recognition program.  Efforts were made to edit the dictations but occasionally words are mis-transcribed.)    Germán Eddy PA-C (electronically signed)           Jana Cameron [place]  02/28/22 7724

## 2022-02-28 NOTE — FLOWSHEET NOTE
Doorway flexion  10 x 5 sec      Doorway biceps stretch  4 x 15 sec     SB flexion stretch on table  10 x 10 sec     Supine flexion  10 x 5 sec            Therapeutic Activities (67625)       Shelf taps                                   Neuromuscular Re-ed (73841)       Median nv glide        IR strech      ER + lift off peach 1 10    Wall clock peach 1 10                  Manual Intervention (97850)       Gentle traction LUE  X 4 min     Shoulder PROM LUE  X 4 min                                     Modalities:   2/24 Pt supine with hot pack on neck/shoulders for 10 min to decrease muscle tightness and pain. Pt. Education:  -patient educated on diagnosis, prognosis and expectations for rehab  -all patient questions were answered    Home Exercise Program:  2/14/22:  Access Code: [facility]  URL: [email or web address]  Date: 02/14/2022  Prepared by: Melina Side    Exercises  Shoulder External Rotation and Scapular Retraction with Resistance - 2 x daily - 7 x weekly - 2 sets - 10 reps  Seated Scapular Retraction - 2 x daily - 7 x weekly - 1 sets - 10 reps - 5 sec hold        Therapeutic Exercise and NMR EXR  [x] (28392) Provided verbal/tactile cueing for activities related to strengthening, flexibility, endurance, ROM for improvements in  [] LE / Lumbar: LE, proximal hip, and core control with self care, mobility, lifting, ambulation. [x] UE / Cervical: cervical, postural, scapular, scapulothoracic and UE control with self care, reaching, carrying, lifting, house/yardwork, driving, computer work.  [] (56547) Provided verbal/tactile cueing for activities related to improving balance, coordination, kinesthetic sense, posture, motor skill, proprioception to assist with   [] LE / lumbar: LE, proximal hip, and core control in self care, mobility, lifting, ambulation and eccentric single leg control.    [] UE / cervical: cervical, scapular, scapulothoracic and UE control with self care, reaching, carrying, lifting, house/yardwork, driving, computer work.   [] (95948) Therapist is in constant attendance of 2 or more patients providing skilled therapy interventions, but not providing any significant amount of measurable one-on-one time to either patient, for improvements in  [] LE / lumbar: LE, proximal hip, and core control in self care, mobility, lifting, ambulation and eccentric single leg control. [] UE / cervical: cervical, scapular, scapulothoracic and UE control with self care, reaching, carrying, lifting, house/yardwork, driving, computer work. NMR and Therapeutic Activities:    [] (56356 or 99809) Provided verbal/tactile cueing for activities related to improving balance, coordination, kinesthetic sense, posture, motor skill, proprioception and motor activation to allow for proper function of   [] LE: / Lumbar core, proximal hip and LE with self care and ADLs  [] UE / Cervical: cervical, postural, scapular, scapulothoracic and UE control with self care, carrying, lifting, driving, computer work.   [] (86994) Gait Re-education- Provided training and instruction to the patient for proper LE, core and proximal hip recruitment and positioning and eccentric body weight control with ambulation re-education including up and down stairs     Home Management Training / Self Care:  [] (51858) Provided self-care/home management training related to activities of daily living and compensatory training, and/or use of adaptive equipment for improvement with: ADLs and compensatory training, meal preparation, safety procedures and instruction in use of adaptive equipment, including bathing, grooming, dressing, personal hygiene, basic household cleaning and chores.      Home Exercise Program:    [] (99158) Reviewed/Progressed HEP activities related to strengthening, flexibility, endurance, ROM of   [] LE / Lumbar: core, proximal hip and LE for functional self-care, mobility, lifting and ambulation/stair navigation   [] UE / Cervical: cervical, postural, scapular, scapulothoracic and UE control with self care, reaching, carrying, lifting, house/yardwork, driving, computer work  [] (79455)Reviewed/Progressed HEP activities related to improving balance, coordination, kinesthetic sense, posture, motor skill, proprioception of   [] LE: core, proximal hip and LE for self care, mobility, lifting, and ambulation/stair navigation    [] UE / Cervical: cervical, postural,  scapular, scapulothoracic and UE control with self care, reaching, carrying, lifting, house/yardwork, driving, computer work    Manual Treatments:  PROM / STM / Oscillations-Mobs:  G-I, II, III, IV (PA's, Inf., Post.)  [x] (19681) Provided manual therapy to mobilize LE, proximal hip and/or LS spine soft tissue/joints for the purpose of modulating pain, promoting relaxation,  increasing ROM, reducing/eliminating soft tissue swelling/inflammation/restriction, improving soft tissue extensibility and allowing for proper ROM for normal function with   [] LE / lumbar: self care, mobility, lifting and ambulation. [x] UE / Cervical: self care, reaching, carrying, lifting, house/yardwork, driving, computer work. Modalities:  [] (59343) Vasopneumatic compression: Utilized vasopneumatic compression to decrease edema / swelling for the purpose of improving mobility and quad tone / recruitment which will allow for increased overall function including but not limited to self-care, transfers, ambulation, and ascending / descending stairs.        Charges:  Timed Code Treatment Minutes: 44   Total Treatment Minutes: 44     [] EVAL - LOW (99642)   [] EVAL - MOD (02181)  [] EVAL - HIGH (77042)  [] RE-EVAL (19893)  [x] FE(51880) x 1      [] Ionto  [x] NMR (96218) x 1       [] Vaso  [x] Manual (26769) x 1      [] Ultrasound  [] TA x        [] Mech Traction (13714)  [] Aquatic Therapy x      [] ES (un) (55039):   [] Home Management Training x  [] ES(attended) (00665)   [] Dry Needling 1-2 muscles (42561):  [] Dry Needling 3+ muscles (912531)  [] Group:      [] Other:     GOALS:   Patient stated goal: \" increased use of LUE\"  []? Progressing: []? Met: []? Not Met: []? Adjusted     Therapist goals for Patient:   Short Term Goals: To be achieved in: 2 weeks  1. Independent in HEP and progression per patient tolerance, in order to prevent re-injury. []? Progressing: []? Met: []? Not Met: []? Adjusted  2. Patient will have a decrease in pain to 0/10 of LUE to facilitate improvement in movement, function, and ADLs as indicated by QuickDASH. []? Progressing: []? Met: []? Not Met: []? Adjusted     Long Term Goals: To be achieved in: 5 weeks  1. Disability index score of 35% or less for the Quick DASH to assist with reaching prior pain-free ADLS. []? Progressing: []? Met: []? Not Met: []? Adjusted  2. Patient will demonstrate increased AROM to BUE in flexionand abduction by 10 deg in order to reach [place] with less pain. []? Progressing: []? Met: []? Not Met: []? Adjusted  3. Patient will demonstrate an increase in Strength to 5 in BUE in order to be able to put dishes away and carry groceries with more ease and without increase pain or symptoms. []? Progressing: []? Met: []? Not Met: []? Adjusted  4. Patient will be able to wash her back without increased pain or reproduction of symptoms in   []? Progressing: []? Met: []? Not Met: []? Adjusted    Overall Progression Towards Functional goals/ Treatment Progress Update:  [] Patient is progressing as expected towards functional goals listed. [] Progression is slowed due to complexities/Impairments listed. [] Progression has been slowed due to co-morbidities.   [x] Plan just implemented, too soon to assess goals progression <30days   [] Goals require adjustment due to lack of progress  [] Patient is not progressing as expected and requires additional follow up with physician  [] Other    Persisting Functional Limitations/Impairments:  [x]Sleeping [x]Sitting               [x]Standing []Transfers        []Walking []Kneeling               []Stairs []Squatting / bending   [x]ADLs [x]Reaching  [x]Lifting  [x]Housework  []Driving []Job related tasks  []Sports/Recreation []Other:        ASSESSMENT:   Pt had some shoulder pain with high rows, reduced with improved posture with shoulder back and down. Added ER + lift and wall clocks. Required VC for correct motion, and Pt able to perform with good posture. Increased difficulty with exercise as shoulders fatigued. Plan to continue to strengthen and improve BUE AROM in order to return to pain-free functional mobility. Treatment/Activity Tolerance:  [x] Patient able to complete tx  [] Patient limited by fatigue  [x] Patient limited by pain  [] Patient limited by other medical complications  [] Other:     Prognosis: [x] Good [] Fair  [] Poor    Patient Requires Follow-up: [x] Yes  [] No    Plan for next treatment session:    PLAN: See eval. PT 2x / week for 5 weeks. [] Continue per plan of care [] Alter current plan (see comments)  [x] Plan of care initiated [] Hold pending MD visit [] Discharge    Electronically signed by: Regina Metcalf, PT, DPT    Note: If patient does not return for scheduled/ recommended follow up visits, this note will serve as a discharge from care along with most recent update on progress.

## 2022-03-01 ENCOUNTER — TELEPHONE (OUTPATIENT)
Dept: INTERNAL MEDICINE CLINIC | Age: 57
End: 2022-03-01

## 2022-03-01 NOTE — TELEPHONE ENCOUNTER
Sonya Chun from [facility] called to see if order has been received. Informed Austyn Cruz was out of the office today. Please call.

## 2022-03-03 ENCOUNTER — HOSPITAL ENCOUNTER (OUTPATIENT)
Dept: PHYSICAL THERAPY | Age: 57
Setting detail: THERAPIES SERIES
Discharge: HOME | End: 2022-03-03
Payer: COMMERCIAL

## 2022-03-03 PROCEDURE — 97112 NEUROMUSCULAR REEDUCATION: CPT

## 2022-03-03 PROCEDURE — 97110 THERAPEUTIC EXERCISES: CPT

## 2022-03-03 PROCEDURE — 97530 THERAPEUTIC ACTIVITIES: CPT

## 2022-03-03 NOTE — FLOWSHEET NOTE
[address] [facility] Physical Therapy  Phone: [phone]   Fax: [phone]    Physical Therapy Daily Treatment Note  Date:  3/3/2022    Patient Name:  Patricia Bell    :  1965  MRN: [id number]  Medical/Treatment Diagnosis Information:  Diagnosis: A29.003 (ICD-10-CM) - Acute pain of left shoulder  Treatment Diagnosis: Postural dysnfucntion, decreased strength and AROM  Insurance/Certification information:  PT Insurance Information: [facility] Plan  Physician Information:  Referring Practitioner: BARRY Ovalles  Plan of care signed (Y/N): []  Yes [x]  No     Date of Patient follow up with Physician:      Progress Report: []  Yes  [x]  No     Date Range for reporting period:  Beginnin22  Ending:     Progress report due (10 Rx/or 30 days whichever is less): visit #10 or 43 (date)     Recertification due (POC duration/ or 90 days whichever is less): visit #10 or 22 (date)     Visit # Insurance Allowable Auth required?  Date Range   6/10 30-soft max []  Yes  [x]  No -       Latex Allergy:  [x]NO      []YES  Preferred Language for Healthcare:   [x]English       []other:    Functional Scale:         Date assessed:  QuickDASH: raw score = 55 dysfunction = 100%   22    Pain level:  510     SUBJECTIVE:  Pt     OBJECTIVE: See eval      RESTRICTIONS/PRECAUTIONS:     Exercises/Interventions:     Therapeutic Exercises (03490) Resistance / level Sets/sec Reps Notes   Arm bike-if alix  2 min/2 min     pullies  1 20 ea    Lat pull down  High row  Mid row   IR  ER Green  Green  Green  Green  Green  2  2  2  2  2 10  10  10  10 B  10 B    Shoulder ER  2 10 HEP   scap squeeze    HEP   SL abd, flex       Wall slide       SB table   -fwd/bwd, L/R, CW/CCW     Doorway flexion  10 x 5 sec      Doorway biceps stretch  4 x 15 sec     SB flexion stretch on table      Supine flexion             Therapeutic Activities (66109)       Shelf taps 1# 1 10 B    Cane mirror flexion  1 10                         Neuromuscular Re-ed (34628)       Median nv glide        IR strech      ER + lift off peach 1 10    Wall clock peach 1 10                  Manual Intervention (21837)       Gentle traction LUE  X 3 min     Shoulder PROM LUE  X 3 min                                     Modalities:   2/24 Pt supine with hot pack on neck/shoulders for 10 min to decrease muscle tightness and pain. Pt. Education:  -patient educated on diagnosis, prognosis and expectations for rehab  -all patient questions were answered    Home Exercise Program:  2/14/22:  Access Code: [facility]  URL: [email or web address]  Date: 02/14/2022  Prepared by: Estephanie Judge    Exercises  Shoulder External Rotation and Scapular Retraction with Resistance - 2 x daily - 7 x weekly - 2 sets - 10 reps  Seated Scapular Retraction - 2 x daily - 7 x weekly - 1 sets - 10 reps - 5 sec hold        Therapeutic Exercise and NMR EXR  [x] (05031) Provided verbal/tactile cueing for activities related to strengthening, flexibility, endurance, ROM for improvements in  [] LE / Lumbar: LE, proximal hip, and core control with self care, mobility, lifting, ambulation. [x] UE / Cervical: cervical, postural, scapular, scapulothoracic and UE control with self care, reaching, carrying, lifting, house/yardwork, driving, computer work. [x] (61982) Provided verbal/tactile cueing for activities related to improving balance, coordination, kinesthetic sense, posture, motor skill, proprioception to assist with   [x] LE / lumbar: LE, proximal hip, and core control in self care, mobility, lifting, ambulation and eccentric single leg control.    [] UE / cervical: cervical, scapular, scapulothoracic and UE control with self care, reaching, carrying, lifting, house/yardwork, driving, computer work.   [] (36455) Therapist is in constant attendance of 2 or more patients providing skilled therapy interventions, but not providing any significant amount of measurable one-on-one time to either patient, for improvements in  [] LE / lumbar: LE, proximal hip, and core control in self care, mobility, lifting, ambulation and eccentric single leg control. [] UE / cervical: cervical, scapular, scapulothoracic and UE control with self care, reaching, carrying, lifting, house/yardwork, driving, computer work. NMR and Therapeutic Activities:    [x] (78100 or 39173) Provided verbal/tactile cueing for activities related to improving balance, coordination, kinesthetic sense, posture, motor skill, proprioception and motor activation to allow for proper function of   [] LE: / Lumbar core, proximal hip and LE with self care and ADLs  [x] UE / Cervical: cervical, postural, scapular, scapulothoracic and UE control with self care, carrying, lifting, driving, computer work.   [] (42945) Gait Re-education- Provided training and instruction to the patient for proper LE, core and proximal hip recruitment and positioning and eccentric body weight control with ambulation re-education including up and down stairs     Home Management Training / Self Care:  [] (62384) Provided self-care/home management training related to activities of daily living and compensatory training, and/or use of adaptive equipment for improvement with: ADLs and compensatory training, meal preparation, safety procedures and instruction in use of adaptive equipment, including bathing, grooming, dressing, personal hygiene, basic household cleaning and chores.      Home Exercise Program:    [] (57006) Reviewed/Progressed HEP activities related to strengthening, flexibility, endurance, ROM of   [] LE / Lumbar: core, proximal hip and LE for functional self-care, mobility, lifting and ambulation/stair navigation   [] UE / Cervical: cervical, postural, scapular, scapulothoracic and UE control with self care, reaching, carrying, lifting, house/yardwork, driving, computer work  [] (77401)Reviewed/Progressed HEP activities related to improving balance, coordination, kinesthetic sense, posture, motor skill, proprioception of   [] LE: core, proximal hip and LE for self care, mobility, lifting, and ambulation/stair navigation    [] UE / Cervical: cervical, postural,  scapular, scapulothoracic and UE control with self care, reaching, carrying, lifting, house/yardwork, driving, computer work    Manual Treatments:  PROM / STM / Oscillations-Mobs:  G-I, II, III, IV (PA's, Inf., Post.)  [] (80928) Provided manual therapy to mobilize LE, proximal hip and/or LS spine soft tissue/joints for the purpose of modulating pain, promoting relaxation,  increasing ROM, reducing/eliminating soft tissue swelling/inflammation/restriction, improving soft tissue extensibility and allowing for proper ROM for normal function with   [] LE / lumbar: self care, mobility, lifting and ambulation. [] UE / Cervical: self care, reaching, carrying, lifting, house/yardwork, driving, computer work. Modalities:  [] (77542) Vasopneumatic compression: Utilized vasopneumatic compression to decrease edema / swelling for the purpose of improving mobility and quad tone / recruitment which will allow for increased overall function including but not limited to self-care, transfers, ambulation, and ascending / descending stairs. Charges:  Timed Code Treatment Minutes: 45   Total Treatment Minutes: 45     [] EVAL - LOW (47712)   [] EVAL - MOD (08131)  [] EVAL - HIGH (39797)  [] RE-EVAL (03266)  [x] AH(33190) x 1      [] Ionto  [x] NMR (04054) x 1       [] Vaso  [] Manual (16322) x 1      [] Ultrasound  [x] TA x 1       [] Mech Traction (56348)  [] Aquatic Therapy x      [] ES (un) (97916):   [] Home Management Training x  [] ES(attended) (50306)   [] Dry Needling 1-2 muscles (55843):  [] Dry Needling 3+ muscles (209378)  [] Group:      [] Other:     GOALS:   Patient stated goal: \" increased use of LUE\"  []? Progressing: []?  Met: []? Not Met: []? Adjusted     Therapist goals for Patient:   Short Term Goals: To be achieved in: 2 weeks  1. Independent in HEP and progression per patient tolerance, in order to prevent re-injury. []? Progressing: []? Met: []? Not Met: []? Adjusted  2. Patient will have a decrease in pain to 0/10 of LUE to facilitate improvement in movement, function, and ADLs as indicated by QuickDASH. []? Progressing: []? Met: []? Not Met: []? Adjusted     Long Term Goals: To be achieved in: 5 weeks  1. Disability index score of 35% or less for the Quick DASH to assist with reaching prior pain-free ADLS. []? Progressing: []? Met: []? Not Met: []? Adjusted  2. Patient will demonstrate increased AROM to BUE in flexionand abduction by 10 deg in order to reach [place] with less pain. []? Progressing: []? Met: []? Not Met: []? Adjusted  3. Patient will demonstrate an increase in Strength to 5 in BUE in order to be able to put dishes away and carry groceries with more ease and without increase pain or symptoms. []? Progressing: []? Met: []? Not Met: []? Adjusted  4. Patient will be able to wash her back without increased pain or reproduction of symptoms in   []? Progressing: []? Met: []? Not Met: []? Adjusted    Overall Progression Towards Functional goals/ Treatment Progress Update:  [] Patient is progressing as expected towards functional goals listed. [] Progression is slowed due to complexities/Impairments listed. [] Progression has been slowed due to co-morbidities.   [x] Plan just implemented, too soon to assess goals progression <30days   [] Goals require adjustment due to lack of progress  [] Patient is not progressing as expected and requires additional follow up with physician  [] Other    Persisting Functional Limitations/Impairments:  [x]Sleeping [x]Sitting               [x]Standing []Transfers        []Walking []Kneeling               []Stairs []Squatting / bending   [x]ADLs [x]Reaching  [x]Lifting [x]Housework  []Driving []Job related tasks  []Sports/Recreation []Other:        ASSESSMENT:   Able to complete band exercises with green band for increased resistance. Pt had some pain in R shoulder with ER lift off and wall clock today. TC and VC given to engage periscapular musculature during exercise to limit shoulder pain. Able to perform OH shelf taps with 1# weight and cane flexion in mirror without shoulder shrug. Plan to continue to strengthen and improve BUE AROM in order to return to pain-free functional mobility. Treatment/Activity Tolerance:  [x] Patient able to complete tx  [] Patient limited by fatigue  [x] Patient limited by pain  [] Patient limited by other medical complications  [] Other:     Prognosis: [x] Good [] Fair  [] Poor    Patient Requires Follow-up: [x] Yes  [] No    Plan for next treatment session:    PLAN: See leesa. PT 2x / week for 5 weeks. [] Continue per plan of care [] Alter current plan (see comments)  [x] Plan of care initiated [] Hold pending MD visit [] Discharge    Electronically signed by: Jones Collins, PT, DPT    Note: If patient does not return for scheduled/ recommended follow up visits, this note will serve as a discharge from care along with most recent update on progress.

## 2022-03-07 ENCOUNTER — HOSPITAL ENCOUNTER (OUTPATIENT)
Dept: PHYSICAL THERAPY | Age: 57
Setting detail: THERAPIES SERIES
Discharge: HOME | End: 2022-03-07
Payer: COMMERCIAL

## 2022-03-07 PROCEDURE — 97530 THERAPEUTIC ACTIVITIES: CPT

## 2022-03-07 PROCEDURE — 97110 THERAPEUTIC EXERCISES: CPT

## 2022-03-07 PROCEDURE — 97112 NEUROMUSCULAR REEDUCATION: CPT

## 2022-03-07 NOTE — FLOWSHEET NOTE
Supine flexion             Therapeutic Activities (17680)       Shelf taps 1# & 2# 1 10 B    Cane mirror flexion  1 10                         Neuromuscular Re-ed (51094)       Median nv glide        IR strech      ER + lift off peach 1 10    Wall clock peach 1 10                  Manual Intervention (34602)       Gentle traction LUE  X 3 min     Shoulder PROM LUE  X 3 min                                     Modalities:   2/24 Pt supine with hot pack on neck/shoulders for 10 min to decrease muscle tightness and pain. Pt. Education:  -patient educated on diagnosis, prognosis and expectations for rehab  -all patient questions were answered    Home Exercise Program:  2/14/22:  Access Code: [facility]  URL: [email or web address]  Date: 02/14/2022  Prepared by: Zhanna Jimenez    Exercises  Shoulder External Rotation and Scapular Retraction with Resistance - 2 x daily - 7 x weekly - 2 sets - 10 reps  Seated Scapular Retraction - 2 x daily - 7 x weekly - 1 sets - 10 reps - 5 sec hold        Therapeutic Exercise and NMR EXR  [x] (92044) Provided verbal/tactile cueing for activities related to strengthening, flexibility, endurance, ROM for improvements in  [] LE / Lumbar: LE, proximal hip, and core control with self care, mobility, lifting, ambulation. [x] UE / Cervical: cervical, postural, scapular, scapulothoracic and UE control with self care, reaching, carrying, lifting, house/yardwork, driving, computer work. [x] (83847) Provided verbal/tactile cueing for activities related to improving balance, coordination, kinesthetic sense, posture, motor skill, proprioception to assist with   [x] LE / lumbar: LE, proximal hip, and core control in self care, mobility, lifting, ambulation and eccentric single leg control.    [] UE / cervical: cervical, scapular, scapulothoracic and UE control with self care, reaching, carrying, lifting, house/yardwork, driving, computer work.   [] (65365) Therapist is in constant attendance of 2 or more patients providing skilled therapy interventions, but not providing any significant amount of measurable one-on-one time to either patient, for improvements in  [] LE / lumbar: LE, proximal hip, and core control in self care, mobility, lifting, ambulation and eccentric single leg control. [] UE / cervical: cervical, scapular, scapulothoracic and UE control with self care, reaching, carrying, lifting, house/yardwork, driving, computer work. NMR and Therapeutic Activities:    [x] (44874 or 47330) Provided verbal/tactile cueing for activities related to improving balance, coordination, kinesthetic sense, posture, motor skill, proprioception and motor activation to allow for proper function of   [] LE: / Lumbar core, proximal hip and LE with self care and ADLs  [x] UE / Cervical: cervical, postural, scapular, scapulothoracic and UE control with self care, carrying, lifting, driving, computer work.   [] (01381) Gait Re-education- Provided training and instruction to the patient for proper LE, core and proximal hip recruitment and positioning and eccentric body weight control with ambulation re-education including up and down stairs     Home Management Training / Self Care:  [] (57376) Provided self-care/home management training related to activities of daily living and compensatory training, and/or use of adaptive equipment for improvement with: ADLs and compensatory training, meal preparation, safety procedures and instruction in use of adaptive equipment, including bathing, grooming, dressing, personal hygiene, basic household cleaning and chores.      Home Exercise Program:    [] (39172) Reviewed/Progressed HEP activities related to strengthening, flexibility, endurance, ROM of   [] LE / Lumbar: core, proximal hip and LE for functional self-care, mobility, lifting and ambulation/stair navigation   [] UE / Cervical: cervical, postural, scapular, scapulothoracic and UE control with self care, reaching, carrying, lifting, house/yardwork, driving, computer work  [] (32875)Reviewed/Progressed HEP activities related to improving balance, coordination, kinesthetic sense, posture, motor skill, proprioception of   [] LE: core, proximal hip and LE for self care, mobility, lifting, and ambulation/stair navigation    [] UE / Cervical: cervical, postural,  scapular, scapulothoracic and UE control with self care, reaching, carrying, lifting, house/yardwork, driving, computer work    Manual Treatments:  PROM / STM / Oscillations-Mobs:  G-I, II, III, IV (PA's, Inf., Post.)  [] (05889) Provided manual therapy to mobilize LE, proximal hip and/or LS spine soft tissue/joints for the purpose of modulating pain, promoting relaxation,  increasing ROM, reducing/eliminating soft tissue swelling/inflammation/restriction, improving soft tissue extensibility and allowing for proper ROM for normal function with   [] LE / lumbar: self care, mobility, lifting and ambulation. [] UE / Cervical: self care, reaching, carrying, lifting, house/yardwork, driving, computer work. Modalities:  [] (23603) Vasopneumatic compression: Utilized vasopneumatic compression to decrease edema / swelling for the purpose of improving mobility and quad tone / recruitment which will allow for increased overall function including but not limited to self-care, transfers, ambulation, and ascending / descending stairs.        Charges:  Timed Code Treatment Minutes: 44   Total Treatment Minutes: 44     [] EVAL - LOW (87645)   [] EVAL - MOD (46507)  [] EVAL - HIGH (20934)  [] RE-EVAL (03245)  [x] ZE(38487) x 1      [] Ionto  [x] NMR (37125) x 1       [] Vaso  [] Manual (64652) x 1      [] Ultrasound  [x] TA x 1       [] Mech Traction (35585)  [] Aquatic Therapy x      [] ES (un) (52973):   [] Home Management Training x  [] ES(attended) (64525)   [] Dry Needling 1-2 muscles (85182):  [] Dry Needling 3+ muscles (511223)  [] Group:      [] Other: GOALS:   Patient stated goal: \" increased use of LUE\"  []? Progressing: []? Met: []? Not Met: []? Adjusted     Therapist goals for Patient:   Short Term Goals: To be achieved in: 2 weeks  1. Independent in HEP and progression per patient tolerance, in order to prevent re-injury. []? Progressing: []? Met: []? Not Met: []? Adjusted  2. Patient will have a decrease in pain to 0/10 of LUE to facilitate improvement in movement, function, and ADLs as indicated by QuickDASH. []? Progressing: []? Met: []? Not Met: []? Adjusted     Long Term Goals: To be achieved in: 5 weeks  1. Disability index score of 35% or less for the Quick DASH to assist with reaching prior pain-free ADLS. []? Progressing: []? Met: []? Not Met: []? Adjusted  2. Patient will demonstrate increased AROM to BUE in flexionand abduction by 10 deg in order to reach [place] with less pain. []? Progressing: []? Met: []? Not Met: []? Adjusted  3. Patient will demonstrate an increase in Strength to 5 in BUE in order to be able to put dishes away and carry groceries with more ease and without increase pain or symptoms. []? Progressing: []? Met: []? Not Met: []? Adjusted  4. Patient will be able to wash her back without increased pain or reproduction of symptoms in   []? Progressing: []? Met: []? Not Met: []? Adjusted    Overall Progression Towards Functional goals/ Treatment Progress Update:  [] Patient is progressing as expected towards functional goals listed. [] Progression is slowed due to complexities/Impairments listed. [] Progression has been slowed due to co-morbidities.   [x] Plan just implemented, too soon to assess goals progression <30days   [] Goals require adjustment due to lack of progress  [] Patient is not progressing as expected and requires additional follow up with physician  [] Other    Persisting Functional Limitations/Impairments:  [x]Sleeping [x]Sitting               [x]Standing []Transfers        []Walking []Kneeling               []Stairs []Squatting / bending   [x]ADLs [x]Reaching  [x]Lifting  [x]Housework  []Driving []Job related tasks  []Sports/Recreation []Other:        ASSESSMENT:  Pt displayed increased strength with exercises and maintaining proper shoulder posture during exercises with minimal VC to remind her to relax UT activation. Able to complete band exercises with good form this date. Completed 1 set of shelf taps with 2# weight with BUE, then used 1# for RUE on second set. Plan to continue to strengthen and improve BUE AROM in order to return to pain-free functional mobility. Treatment/Activity Tolerance:  [x] Patient able to complete tx  [] Patient limited by fatigue  [x] Patient limited by pain  [] Patient limited by other medical complications  [] Other:     Prognosis: [x] Good [] Fair  [] Poor    Patient Requires Follow-up: [x] Yes  [] No    Plan for next treatment session:    PLAN: See leesa. PT 2x / week for 5 weeks. [] Continue per plan of care [] Alter current plan (see comments)  [x] Plan of care initiated [] Hold pending MD visit [] Discharge    Electronically signed by: Neil Livingston, PT, DPT    Note: If patient does not return for scheduled/ recommended follow up visits, this note will serve as a discharge from care along with most recent update on progress.

## 2022-03-10 ENCOUNTER — HOSPITAL ENCOUNTER (OUTPATIENT)
Dept: PHYSICAL THERAPY | Age: 57
Setting detail: THERAPIES SERIES
Discharge: HOME | End: 2022-03-10
Payer: COMMERCIAL

## 2022-03-10 PROCEDURE — 97140 MANUAL THERAPY 1/> REGIONS: CPT

## 2022-03-10 PROCEDURE — 97112 NEUROMUSCULAR REEDUCATION: CPT

## 2022-03-10 PROCEDURE — 97110 THERAPEUTIC EXERCISES: CPT

## 2022-03-10 NOTE — FLOWSHEET NOTE
[address] [facility]  Phone: [phone]   Fax: [phone]    Physical Therapy Daily Treatment Note  Date:  3/10/2022    Patient Name:  Kate Wall    :  1965  MRN: [id number]  Medical/Treatment Diagnosis Information:  Diagnosis: H19.586 (ICD-10-CM) - Acute pain of left shoulder  Treatment Diagnosis: Postural dysnfucntion, decreased strength and AROM  Insurance/Certification information:  PT Insurance Information: [facility] Plan  Physician Information:  Referring Practitioner: BARRY Hui  Plan of care signed (Y/N): []  Yes [x]  No     Date of Patient follow up with Physician:      Progress Report: []  Yes  [x]  No     Date Range for reporting period:  Beginnin22  Ending:     Progress report due (10 Rx/or 30 days whichever is less): visit #10 or  (date)     Recertification due (POC duration/ or 90 days whichever is less): visit #10 or 22 (date)     Visit # Insurance Allowable Auth required? Date Range   8/10 30-soft max []  Yes  [x]  No -       Latex Allergy:  [x]NO      []YES  Preferred Language for Healthcare:   [x]English       []other:    Functional Scale:         Date assessed:  QuickDASH: raw score = 55 dysfunction = 100%   22    Pain level:  3/10     SUBJECTIVE:  Pt states her shoulders have been feeling good. R hand still bothers her at times.      OBJECTIVE: See eval      RESTRICTIONS/PRECAUTIONS:     Exercises/Interventions:     Therapeutic Exercises (61231) Resistance / level Sets/sec Reps Notes   Arm bike-fwd/bwd  2.5 min/  2.5 min     pullies  1 20 ea    Lat pull down  High row  Mid row   IR  ER Green  Green  Green  Green  Green  2  2  2  2  2 12  12  12  12 B  12 B    Shoulder ER  2 10 HEP   scap squeeze    HEP   SL abd, flex       Wall slide       SB table   -fwd/bwd, L/R, CW/CCW     Doorway flexion      Doorway biceps stretch      Supine flexion      Snow dominga on wall  1 10 Wall ball Tennis ball 1 20 ea           Therapeutic Activities (79771)       Shelf taps 2# 1 10 B    Cane mirror flexion                          Neuromuscular Re-ed (76983)       Median nv glide        IR strech      ER + lift off peach 1 10    Wall clock peach 1 10    Modified plank rocking- fwd/bwd, L/R  1 10 ea           Manual Intervention (73615)       Gentle traction LUE  X 3 min     Shoulder PROM LUE  X 3 min     B GH jt inferior and posterior mobilizations, grade I-III  10 x ea                               Modalities:   2/24 Pt supine with hot pack on neck/shoulders for 10 min to decrease muscle tightness and pain. Pt. Education:  -patient educated on diagnosis, prognosis and expectations for rehab  -all patient questions were answered    Home Exercise Program:  2/14/22:  Access Code: [facility]  URL: [email or web address]  Date: 02/14/2022  Prepared by: Rip Mena    Exercises  Shoulder External Rotation and Scapular Retraction with Resistance - 2 x daily - 7 x weekly - 2 sets - 10 reps  Seated Scapular Retraction - 2 x daily - 7 x weekly - 1 sets - 10 reps - 5 sec hold        Therapeutic Exercise and NMR EXR  [x] (62895) Provided verbal/tactile cueing for activities related to strengthening, flexibility, endurance, ROM for improvements in  [] LE / Lumbar: LE, proximal hip, and core control with self care, mobility, lifting, ambulation. [x] UE / Cervical: cervical, postural, scapular, scapulothoracic and UE control with self care, reaching, carrying, lifting, house/yardwork, driving, computer work. [x] (57550) Provided verbal/tactile cueing for activities related to improving balance, coordination, kinesthetic sense, posture, motor skill, proprioception to assist with   [x] LE / lumbar: LE, proximal hip, and core control in self care, mobility, lifting, ambulation and eccentric single leg control.    [] UE / cervical: cervical, scapular, scapulothoracic and UE control with self care, reaching, carrying, lifting, house/yardwork, driving, computer work.   [] (34263) Therapist is in constant attendance of 2 or more patients providing skilled therapy interventions, but not providing any significant amount of measurable one-on-one time to either patient, for improvements in  [] LE / lumbar: LE, proximal hip, and core control in self care, mobility, lifting, ambulation and eccentric single leg control. [] UE / cervical: cervical, scapular, scapulothoracic and UE control with self care, reaching, carrying, lifting, house/yardwork, driving, computer work. NMR and Therapeutic Activities:    [x] (24412 or 46218) Provided verbal/tactile cueing for activities related to improving balance, coordination, kinesthetic sense, posture, motor skill, proprioception and motor activation to allow for proper function of   [] LE: / Lumbar core, proximal hip and LE with self care and ADLs  [x] UE / Cervical: cervical, postural, scapular, scapulothoracic and UE control with self care, carrying, lifting, driving, computer work.   [] (43333) Gait Re-education- Provided training and instruction to the patient for proper LE, core and proximal hip recruitment and positioning and eccentric body weight control with ambulation re-education including up and down stairs     Home Management Training / Self Care:  [] (35242) Provided self-care/home management training related to activities of daily living and compensatory training, and/or use of adaptive equipment for improvement with: ADLs and compensatory training, meal preparation, safety procedures and instruction in use of adaptive equipment, including bathing, grooming, dressing, personal hygiene, basic household cleaning and chores.      Home Exercise Program:    [] (78119) Reviewed/Progressed HEP activities related to strengthening, flexibility, endurance, ROM of   [] LE / Lumbar: core, proximal hip and LE for functional self-care, mobility, lifting and ambulation/stair navigation   [] UE / Cervical: cervical, postural, scapular, scapulothoracic and UE control with self care, reaching, carrying, lifting, house/yardwork, driving, computer work  [] (16332)Reviewed/Progressed HEP activities related to improving balance, coordination, kinesthetic sense, posture, motor skill, proprioception of   [] LE: core, proximal hip and LE for self care, mobility, lifting, and ambulation/stair navigation    [] UE / Cervical: cervical, postural,  scapular, scapulothoracic and UE control with self care, reaching, carrying, lifting, house/yardwork, driving, computer work    Manual Treatments:  PROM / STM / Oscillations-Mobs:  G-I, II, III, IV (PA's, Inf., Post.)  [x] (59305) Provided manual therapy to mobilize LE, proximal hip and/or LS spine soft tissue/joints for the purpose of modulating pain, promoting relaxation,  increasing ROM, reducing/eliminating soft tissue swelling/inflammation/restriction, improving soft tissue extensibility and allowing for proper ROM for normal function with   [] LE / lumbar: self care, mobility, lifting and ambulation. [x] UE / Cervical: self care, reaching, carrying, lifting, house/yardwork, driving, computer work. Modalities:  [] (74041) Vasopneumatic compression: Utilized vasopneumatic compression to decrease edema / swelling for the purpose of improving mobility and quad tone / recruitment which will allow for increased overall function including but not limited to self-care, transfers, ambulation, and ascending / descending stairs.        Charges:  Timed Code Treatment Minutes: 45   Total Treatment Minutes: 45     [] EVAL - LOW (64372)   [] EVAL - MOD (65284)  [] EVAL - HIGH (73559)  [] RE-EVAL (47230)  [x] TT(81239) x 1      [] Ionto  [x] NMR (77786) x 1       [] Vaso  [x] Manual (99545) x 1      [] Ultrasound  [] TA x 1       [] Mech Traction (69292)  [] Aquatic Therapy x      [] ES (un) (14929):   [] Home Management Training x  [] ES(attended) (91629)   [] Dry Needling 1-2 muscles (94020):  [] Dry Needling 3+ muscles (278760)  [] Group:      [] Other:     GOALS:   Patient stated goal: \" increased use of LUE\"  []? Progressing: []? Met: []? Not Met: []? Adjusted     Therapist goals for Patient:   Short Term Goals: To be achieved in: 2 weeks  1. Independent in HEP and progression per patient tolerance, in order to prevent re-injury. []? Progressing: []? Met: []? Not Met: []? Adjusted  2. Patient will have a decrease in pain to 0/10 of LUE to facilitate improvement in movement, function, and ADLs as indicated by QuickDASH. []? Progressing: []? Met: []? Not Met: []? Adjusted     Long Term Goals: To be achieved in: 5 weeks  1. Disability index score of 35% or less for the Quick DASH to assist with reaching prior pain-free ADLS. []? Progressing: []? Met: []? Not Met: []? Adjusted  2. Patient will demonstrate increased AROM to BUE in flexionand abduction by 10 deg in order to reach [place] with less pain. []? Progressing: []? Met: []? Not Met: []? Adjusted  3. Patient will demonstrate an increase in Strength to 5 in BUE in order to be able to put dishes away and carry groceries with more ease and without increase pain or symptoms. []? Progressing: []? Met: []? Not Met: []? Adjusted  4. Patient will be able to wash her back without increased pain or reproduction of symptoms in   []? Progressing: []? Met: []? Not Met: []? Adjusted    Overall Progression Towards Functional goals/ Treatment Progress Update:  [] Patient is progressing as expected towards functional goals listed. [] Progression is slowed due to complexities/Impairments listed. [] Progression has been slowed due to co-morbidities.   [x] Plan just implemented, too soon to assess goals progression <30days   [] Goals require adjustment due to lack of progress  [] Patient is not progressing as expected and requires additional follow up with physician  [] Other    Persisting Functional

## 2022-03-14 ENCOUNTER — HOSPITAL ENCOUNTER (OUTPATIENT)
Dept: PHYSICAL THERAPY | Age: 57
Setting detail: THERAPIES SERIES
Discharge: HOME | End: 2022-03-14
Payer: COMMERCIAL

## 2022-03-14 PROCEDURE — 97110 THERAPEUTIC EXERCISES: CPT

## 2022-03-14 PROCEDURE — 97530 THERAPEUTIC ACTIVITIES: CPT

## 2022-03-14 PROCEDURE — 97112 NEUROMUSCULAR REEDUCATION: CPT

## 2022-03-14 NOTE — PROGRESS NOTES
[address] [facility]  Phone: [phone]   Fax: [phone]    Physical Therapy Daily Treatment Note  Date:  3/14/2022    Patient Name:  Ely Aly    :  1965  MRN: [id number]  Medical/Treatment Diagnosis Information:  Diagnosis: F75.448 (ICD-10-CM) - Acute pain of left shoulder  Treatment Diagnosis: Postural dysnfucntion, decreased strength and AROM  Insurance/Certification information:  PT Insurance Information: [facility] Plan  Physician Information:  Referring Practitioner: BARRY Lane  Plan of care signed (Y/N): []  Yes [x]  No     Date of Patient follow up with Physician:      Progress Report: []  Yes  [x]  No     Date Range for reporting period:  Beginnin22  Ending:     Progress report due (10 Rx/or 30 days whichever is less): visit #10 or 39 (date)     Recertification due (POC duration/ or 90 days whichever is less): visit #10 or 22 (date)     Visit # Insurance Allowable Auth required? Date Range   9/10 30-soft max []  Yes  [x]  No -       Latex Allergy:  [x]NO      []YES  Preferred Language for Healthcare:   [x]English       []other:    Functional Scale:         Date assessed:  QuickDASH: raw score = 55 dysfunction = 100%   22  QuickDASH: raw score = 18 dysfunction = 15%   3/14/22    Pain level:  3/10     SUBJECTIVE:  Pt states that her shoulders are hurting less today.       OBJECTIVE:   3/14  Dermatomes Normal Abnormal Comments   Top of head (C1) x      Posterior occipital region (C2) x       Side of neck (C3) x      Top of shoulder (C4) L R     Lateral deltoid (C5) L R     Tip of thumb (C6) L R     Distal middle finger (C7) L R     Distal fifth finger (C8) L R     Medial forearm (T1) L R                 PROM AROM     L R L R   Cervical Flexion            Cervical Extension            Cervical Rotation           Cervical Side-bend           Shoulder Flexion      141* 139*   Shoulder Abduction      109* 121*   Shoulder External Rotation      97  T2 91  T3   Shoulder Internal Rotation      99  T10 85  T12   Elbow Flexion            Elbow Extension            Pronation            Supination            Wrist Flexion            Wrist Extension            Radial Deviation            Ulnar Deviation            *= painful     Strength (0-5) Left Right    Shoulder Shrug (C4) 5 5   Shoulder Flex 4- 3*   Shoulder Abd (C5) 4 3*   Shoulder ER 4 3*   Shoulder IR 4 3*   Biceps (C6) 4+ 3*   Triceps (C7) 4+ 3+*   Lats       Middle Trap       Rhomboids       Lower Trap        Pronation        Supination        Wrist Flex (C7)       Wrist Ext (C6)       Wrist Radial Deviation       Wrist Ulnar Deviation                       *= painful        RESTRICTIONS/PRECAUTIONS:     Exercises/Interventions:     Therapeutic Exercises (40281) Resistance / level Sets/sec Reps Notes   Arm bike-fwd/bwd  2.5 min/  2.5 min     pullies  1 20 ea    Lat pull down  High row  Mid row   IR  ER Green  Oumou Crank  Green  Green  Green     Shoulder ER  2 10 HEP   scap squeeze    HEP   SL abd, flex       Wall slide       SB table   -fwd/bwd, L/R, CW/CCW     Doorway flexion      Doorway biceps stretch      Supine flexion      Snow dominga on wall    Wall ball           Therapeutic Activities (36050)       Shelf taps 2#    Cane mirror flexion     PN to assess strength, AROM, sensation, and overall functional mobility. Discussed progress towards goals. X 15 min                     Neuromuscular Re-ed (57497)       Median nv glide        IR strech      ER + lift off peach 1 10    Wall clock peach 1 10    Modified plank rocking- fwd/bwd, L/R  1 10 ea           Manual Intervention (50355)       Gentle traction LUE      Shoulder PROM LUE      B GH jt inferior and posterior mobilizations, grade I-III                               Modalities:   2/24 Pt supine with hot pack on neck/shoulders for 10 min to decrease muscle tightness and pain.     Pt. Education:  -patient educated on diagnosis, prognosis and expectations for rehab  -all patient questions were answered    Home Exercise Program:  2/14/22:  Access Code: [facility]  URL: [email or web address]  Date: 02/14/2022  Prepared by: Arabella Nichols    Exercises  Shoulder External Rotation and Scapular Retraction with Resistance - 2 x daily - 7 x weekly - 2 sets - 10 reps  Seated Scapular Retraction - 2 x daily - 7 x weekly - 1 sets - 10 reps - 5 sec hold        Therapeutic Exercise and NMR EXR  [x] (96068) Provided verbal/tactile cueing for activities related to strengthening, flexibility, endurance, ROM for improvements in  [] LE / Lumbar: LE, proximal hip, and core control with self care, mobility, lifting, ambulation. [x] UE / Cervical: cervical, postural, scapular, scapulothoracic and UE control with self care, reaching, carrying, lifting, house/yardwork, driving, computer work. [x] (43945) Provided verbal/tactile cueing for activities related to improving balance, coordination, kinesthetic sense, posture, motor skill, proprioception to assist with   [x] LE / lumbar: LE, proximal hip, and core control in self care, mobility, lifting, ambulation and eccentric single leg control. [] UE / cervical: cervical, scapular, scapulothoracic and UE control with self care, reaching, carrying, lifting, house/yardwork, driving, computer work.   [] (22466) Therapist is in constant attendance of 2 or more patients providing skilled therapy interventions, but not providing any significant amount of measurable one-on-one time to either patient, for improvements in  [] LE / lumbar: LE, proximal hip, and core control in self care, mobility, lifting, ambulation and eccentric single leg control. [] UE / cervical: cervical, scapular, scapulothoracic and UE control with self care, reaching, carrying, lifting, house/yardwork, driving, computer work.      NMR and Therapeutic Activities:    [x] (35653 or 69911) Provided Oscillations-Mobs:  G-I, II, III, IV (PA's, Inf., Post.)  [] (07452) Provided manual therapy to mobilize LE, proximal hip and/or LS spine soft tissue/joints for the purpose of modulating pain, promoting relaxation,  increasing ROM, reducing/eliminating soft tissue swelling/inflammation/restriction, improving soft tissue extensibility and allowing for proper ROM for normal function with   [] LE / lumbar: self care, mobility, lifting and ambulation. [] UE / Cervical: self care, reaching, carrying, lifting, house/yardwork, driving, computer work. Modalities:  [] (44026) Vasopneumatic compression: Utilized vasopneumatic compression to decrease edema / swelling for the purpose of improving mobility and quad tone / recruitment which will allow for increased overall function including but not limited to self-care, transfers, ambulation, and ascending / descending stairs. Charges:  Timed Code Treatment Minutes: 45   Total Treatment Minutes: 45     [] EVAL - LOW (86327)   [] EVAL - MOD (16038)  [] EVAL - HIGH (93936)  [] RE-EVAL (78978)  [x] XM(21003) x 1      [] Ionto  [x] NMR (60788) x 1       [] Vaso  [] Manual (98495) x 1      [] Ultrasound  [x] TA x 1       [] Mech Traction (52925)  [] Aquatic Therapy x      [] ES (un) (16752):   [] Home Management Training x  [] ES(attended) (77941)   [] Dry Needling 1-2 muscles (84465):  [] Dry Needling 3+ muscles (674908)  [] Group:      [] Other:     GOALS:   Patient stated goal: \" increased use of LUE\"  [x]? Progressing: []? Met: []? Not Met: []? Adjusted     Therapist goals for Patient:   Short Term Goals: To be achieved in: 2 weeks  1. Independent in HEP and progression per patient tolerance, in order to prevent re-injury. [x]? Progressing: []? Met: []? Not Met: []? Adjusted  2. Patient will have a decrease in pain to 0/10 of LUE to facilitate improvement in movement, function, and ADLs as indicated by QuickDASH. [x]? Progressing: []? Met: []?  Not Met: []? Adjusted     Long Term Goals: To be achieved in: 5 weeks  1. Disability index score of 35% or less for the Quick DASH to assist with reaching prior pain-free ADLS. []? Progressing: [x]? Met: []? Not Met: []? Adjusted  2. Patient will demonstrate increased AROM to BUE in flexion and abduction by 10 deg in order to reach [place] with less pain. [x]? Progressing: []? Met: []? Not Met: []? Adjusted  3. Patient will demonstrate an increase in Strength to 5 in BUE in order to be able to put dishes away and carry groceries with more ease and without increase pain or symptoms. [x]? Progressing: []? Met: []? Not Met: []? Adjusted  4. Patient will be able to wash her back without increased pain or reproduction of symptoms in   [x]? Progressing: []? Met: []? Not Met: []? Adjusted    Overall Progression Towards Functional goals/ Treatment Progress Update:  [] Patient is progressing as expected towards functional goals listed. [x] Progression is slowed due to complexities/Impairments listed. [] Progression has been slowed due to co-morbidities. [] Plan just implemented, too soon to assess goals progression <30days   [] Goals require adjustment due to lack of progress  [] Patient is not progressing as expected and requires additional follow up with physician  [] Other    Persisting Functional Limitations/Impairments:  [x]Sleeping [x]Sitting               [x]Standing []Transfers        []Walking []Kneeling               []Stairs []Squatting / bending   [x]ADLs [x]Reaching  [x]Lifting  [x]Housework  []Driving []Job related tasks  []Sports/Recreation []Other:        ASSESSMENT:  Performed PN this date to assess AROM, strength, sensation, QuickDASH and overall functional mobility. Pt improved motion in BUE in all directions, however is limited by pain in both flexion and abduction. Strength improved in LUE, however remains weak in RUE with decreased sensation from previous injury.  States that she has mild difficulty using LUE to wash her hair and clean the kitchen/cook. pt met 1 out of 7 goals set on the initial evaluation. However she is progressing towards all other goals. NPV d/c Pt with HEP program for shoulder strengthening to further work on improve B shoulder strength and mobility at home. Treatment/Activity Tolerance:  [x] Patient able to complete tx  [] Patient limited by fatigue  [x] Patient limited by pain  [] Patient limited by other medical complications  [] Other:     Prognosis: [x] Good [] Fair  [] Poor    Patient Requires Follow-up: [x] Yes  [] No    Plan for next treatment session:    PLAN: See eval. PT 2x / week for 5 weeks. [x] Continue per plan of care [] Alter current plan (see comments)  [] Plan of care initiated [] Hold pending MD visit [] Discharge    Electronically signed by: Sebastien Fleming, PT, DPT    Note: If patient does not return for scheduled/ recommended follow up visits, this note will serve as a discharge from care along with most recent update on progress.

## 2022-03-15 ENCOUNTER — OFFICE VISIT (OUTPATIENT)
Dept: ORTHOPEDIC SURGERY | Age: 57
End: 2022-03-15
Payer: COMMERCIAL

## 2022-03-15 VITALS — WEIGHT: 141 LBS | BODY MASS INDEX: 28.43 KG/M2 | HEIGHT: 59 IN

## 2022-03-15 DIAGNOSIS — M54.32 SCIATICA OF LEFT SIDE: ICD-10-CM

## 2022-03-15 DIAGNOSIS — S83.412A SPRAIN OF MEDIAL COLLATERAL LIGAMENT OF LEFT KNEE, INITIAL ENCOUNTER: Primary | ICD-10-CM

## 2022-03-15 PROCEDURE — 4004F PT TOBACCO SCREEN RCVD TLK: CPT | Performed by: NURSE PRACTITIONER

## 2022-03-15 PROCEDURE — 99203 OFFICE O/P NEW LOW 30 MIN: CPT | Performed by: NURSE PRACTITIONER

## 2022-03-15 PROCEDURE — G8427 DOCREV CUR MEDS BY ELIG CLIN: HCPCS | Performed by: NURSE PRACTITIONER

## 2022-03-15 PROCEDURE — G8482 FLU IMMUNIZE ORDER/ADMIN: HCPCS | Performed by: NURSE PRACTITIONER

## 2022-03-15 PROCEDURE — G8419 CALC BMI OUT NRM PARAM NOF/U: HCPCS | Performed by: NURSE PRACTITIONER

## 2022-03-15 PROCEDURE — 3017F COLORECTAL CA SCREEN DOC REV: CPT | Performed by: NURSE PRACTITIONER

## 2022-03-15 RX ORDER — NAPROXEN 500 MG/1
500 TABLET ORAL 2 TIMES DAILY WITH MEALS
Qty: 60 TABLET | Refills: 0 | Status: SHIPPED | OUTPATIENT
Start: 2022-03-15 | End: 2022-04-06

## 2022-03-15 RX ORDER — PREDNISONE 10 MG/1
TABLET ORAL
Qty: 26 TABLET | Refills: 0 | Status: SHIPPED | OUTPATIENT
Start: 2022-03-15 | End: 2022-07-12

## 2022-03-15 NOTE — PROGRESS NOTES
CHIEF COMPLAINT:   1-Left knee pain/MCL strain  2-Low back pain radiates to left leg/sciatica    Date of injury: 2/25/2022    HISTORY:  Ms. Angelica Maya 55 y.o. Romanian female presents today for the first visit for evaluation of left medial knee pain which started when she was doing her physical therapy for chronic low back pain and pain in her medial knee. She is complaining of achy pain. Pain is increase with standing and walking and decrease with rest. Pain is sharp early in the morning with first few steps, dull achy pain by the end of the day. Alleviating factors: rest.  She is also complaining of low back pain radiates down the leg. She has had this pain for 6 months. She does have intermittent numbness and tingling sensation that refers down her left leg into her foot. She has been working with PT. No other complaint. The patient chews tobacco.  She initially presented to [facility] ER on 2/28/2022 where she was x-rayed and instructed to follow-up with orthopedics      No past medical history on file.     Past Surgical History:   Procedure Laterality Date    SHOULDER SURGERY         Social History     Socioeconomic History    Marital status:      Spouse name: Not on file    Number of children: Not on file    Years of education: Not on file    Highest education level: Not on file   Occupational History    Not on file   Tobacco Use    Smoking status: Never Smoker    Smokeless tobacco: Current User     Types: Chew   Vaping Use    Vaping Use: Never used   Substance and Sexual Activity    Alcohol use: No    Drug use: Never    Sexual activity: Not on file   Other Topics Concern    Not on file   Social History Narrative    Not on file     Social Determinants of Health     Financial Resource Strain: Low Risk     Difficulty of Paying Living Expenses: Not hard at all   Food Insecurity: No Food Insecurity    Worried About [address] [employer] in the Last Year: Never true    [address] [facility] [address] in the Last Year: Never true   Transportation Needs:     Lack of Transportation (Medical): Not on file    Lack of Transportation (Non-Medical): Not on file   Physical Activity:     Days of Exercise per Week: Not on file    Minutes of Exercise per Session: Not on file   Stress:     Feeling of Stress : Not on file   Social Connections:     Frequency of Communication with Friends and Family: Not on file    Frequency of Social Gatherings with Friends and Family: Not on file    Attends Gnosticism Services: Not on file    Active Member of [address] [place] [address] [employer] or Organizations: Not on file    Attends Club or Organization Meetings: Not on file    Marital Status: Not on file   Intimate Partner Violence:     Fear of Current or Ex-Partner: Not on file    Emotionally Abused: Not on file    Physically Abused: Not on file    Sexually Abused: Not on file   Housing Stability:     Unable to Pay for Housing in the Last Year: Not on file    Number of [place] in the Last Year: Not on file    Unstable Housing in the Last Year: Not on file       Family History   Problem Relation Age of Onset    Breast Cancer Sister        Current Outpatient Medications on File Prior to Visit   Medication Sig Dispense Refill    methylPREDNISolone (MEDROL DOSEPACK) 4 MG tablet Take by mouth. 1 kit 0    diclofenac sodium (VOLTAREN) 1 % GEL Apply 4 g topically 4 times daily as needed for Pain 100 g 3     No current facility-administered medications on file prior to visit. Pertinent items are noted in HPI  Review of systems reviewed from Patient History Form and available in the patient's chart under the Media tab. PHYSICAL EXAMINATION:  Ms. Kilo Zayas is a very pleasant 55 y.o. Mongolian female who presents today in no acute distress, awake, alert, and oriented. She is well dressed, nourished and  groomed. Patient with normal affect. Height is  4' 11\" (1.499 m), weight is 141 lb (64 kg), Body mass index is 28.48 kg/m².   Resting respiratory rate is 16. Examination of the gait, showed that the patient walks heel-toe with a non-antalgic gait and no limp. Examination of both knees showing full ROM, left mild crepitus, tenderness on medial joint line, stable to varus and valgus stress. She has intact sensation and good pedal pulses. She has good strength in 2 planes, and has mild tenderness on deep palpation over the medial joint line. Knee reflex 1+ bilaterally. She is tender to palpation over the left medial knee, over the left MCL. She has a positive straight leg raise on the left. She is tender to palpation over the lower lumbar spine. IMAGING:  Xray 3 views of the left knee was obtained 2/28/2022 from [facility] ER and reviewed. These demonstrate no significant DJD. IMPRESSION:   1-Left knee MCL strain. 2-Left sciatica    PLAN: I discussed with the patient the treatment options including both surgical and non-surgical treatment. We recommended Quad exercises and stretching of the calf and hamstrings which was taught to the patient today. She will take prednisone taper followed by NSAIDS Rx sent and instructed in care. I she was instructed to work on the range of motion strengthening exercises of her low back that were demonstrated to her today and to continue to work on the exercises that were given to her by physical therapy as the pain improves. No heavy impact activities. F/u in 6 weeks,, PT or MRI if needed.   She may need referral to the spine team.      Stephanie Avila, APRN - CNP

## 2022-03-17 ENCOUNTER — HOSPITAL ENCOUNTER (OUTPATIENT)
Dept: PHYSICAL THERAPY | Age: 57
Setting detail: THERAPIES SERIES
Discharge: HOME | End: 2022-03-17
Payer: COMMERCIAL

## 2022-03-17 PROCEDURE — 97110 THERAPEUTIC EXERCISES: CPT

## 2022-03-17 PROCEDURE — 97112 NEUROMUSCULAR REEDUCATION: CPT

## 2022-03-17 NOTE — PLAN OF CARE
[facility]  Phone: [phone]   Fax: [phone]     Physical Therapy Discharge Summary    Dear BARRY Rey,  We had the pleasure of treating the following patient for physical therapy services at [facility]. A summary of our findings can be found in the discharge summary below. If you have any questions or concerns regarding these findings, please do not hesitate to contact me at the office phone number checked above. Thank you for the referral.     Physician Signature:________________________________Date:__________________  By signing above (or electronic signature), therapists plan is approved by physician      Functional Outcome:      QuickDASH: raw score = 18 dysfunction = 15%   3/14/22    Overall Response to Treatment:   []Patient is responding well to treatment and improvement is noted with regards  to goals   [x]Patient should continue to improve in reasonable time if they continue HEP   []Patient has plateaued and is no longer responding to skilled PT intervention    []Patient is getting worse and would benefit from return to referring MD   []Patient unable to adhere to initial POC   []Other:  Pt still has impairments in B shoulder strength, ROM, and endurance but have improved since starting PT. Pt to continue to progress with indep HEP. Date range of Visits: -3/17  Total Visits: 10    Recommendation:    [x] Discharge to [facility]. Follow up with PT or MD PRN.            Physical Therapy Daily Treatment Note  Date:  3/17/2022    Patient Name:  Jb Decker    :  1965  MRN: [id number]  Medical/Treatment Diagnosis Information:  Diagnosis: V45.293 (ICD-10-CM) - Acute pain of left shoulder  Treatment Diagnosis: Postural dysnfucntion, decreased strength and AROM  Insurance/Certification information:  PT Insurance Information: [facility]  Physician Information:  Referring Practitioner: BARRY Singh  Plan of care signed (Y/N): [x]  Yes []  No     Date of Patient follow up with Physician:      Progress Report: []  Yes  [x]  No     Date Range for reporting period:  Beginnin22  PN 3/14  Ending: 3/17    Progress report due (10 Rx/or 30 days whichever is less): visit #10 or  (date)     Recertification due (POC duration/ or 90 days whichever is less): visit #10 or 22 (date)     Visit # Insurance Allowable Auth required? Date Range   10/10 30-soft max []  Yes  [x]  No -       Latex Allergy:  [x]NO      []YES  Preferred Language for Healthcare:   [x]English       []other:    Functional Scale:         Date assessed:  QuickDASH: raw score = 55 dysfunction = 100%   22  QuickDASH: raw score = 18 dysfunction = 15%   3/14/22    Pain level:  1-2/10 L shoulder    SUBJECTIVE:  Pt states that her shoulders are feeling \"okay\" today.     OBJECTIVE:   3/14  Dermatomes Normal Abnormal Comments   Top of head (C1) x      Posterior occipital region (C2) x       Side of neck (C3) x      Top of shoulder (C4) L R     Lateral deltoid (C5) L R     Tip of thumb (C6) L R     Distal middle finger (C7) L R     Distal fifth finger (C8) L R     Medial forearm (T1) L R                 PROM AROM     L R L R   Cervical Flexion            Cervical Extension            Cervical Rotation           Cervical Side-bend           Shoulder Flexion      141* 139*   Shoulder Abduction      109* 121*   Shoulder External Rotation      97  T2 91  T3   Shoulder Internal Rotation      99  T10 85  T12   Elbow Flexion            Elbow Extension            Pronation            Supination            Wrist Flexion            Wrist Extension            Radial Deviation            Ulnar Deviation            *= painful     Strength (0-5) Left Right    Shoulder Shrug (C4) 5 5   Shoulder Flex 4- 3*   Shoulder Abd (C5) 4 3*   Shoulder ER 4 3*   Shoulder IR 4 3*   Biceps (C6) 4+ 3* Triceps (C7) 4+ 3+*   Lats       Middle Trap       Rhomboids       Lower Trap        Pronation        Supination        Wrist Flex (C7)       Wrist Ext (C6)       Wrist Radial Deviation       Wrist Ulnar Deviation                       *= painful        RESTRICTIONS/PRECAUTIONS:     Exercises/Interventions:     Therapeutic Exercises (40273) Resistance / level Sets/sec Reps Notes   Arm bike-fwd/bwd  2 min/  2 min     pulley  1 20 ea    Lat pull down    Mid row   IR  ER Green  Green  Green  Green  Green  2  2  2  2  2 10  12  10  10 B  10 B    Shoulder ER  2 10 HEP   scap squeeze    HEP   SL abd, flex       Wall slide       SB table   -fwd/bwd, L/R, CW/CCW     Doorway pec  30\" 3    Doorway biceps stretch      Supine flexion      Snow dominga on wall  2 10 NMR on 3/17   Wall ball Fall ball 1 10 ea NMR on 3/17   Standing flex and ABD 1# 2 10ea    Therapeutic Activities (87274)       Shelf taps 2# 1 10 B    Cane mirror flexion                        Neuromuscular Re-ed (86342)       Median nv glide        IR strech         Wall clock peach 1 10    Modified plank rocking- fwd/bwd, L/R  1 10 ea           Manual Intervention (10789)       Gentle traction LUE      Shoulder PROM LUE      B GH jt inferior and posterior mobilizations, grade I-III                               Modalities:   2/24 Pt supine with hot pack on neck/shoulders for 10 min to decrease muscle tightness and pain. Pt. Education:  -patient educated on diagnosis, prognosis and expectations for rehab  -all patient questions were answered    Home Exercise Program:  2/14/22:  Access Code: [facility]  URL: [email or web address]  Date: 02/14/2022  Prepared by: Scout Castillo    Exercises  Shoulder External Rotation and Scapular Retraction with Resistance - 2 x daily - 7 x weekly - 2 sets - 10 reps  Seated Scapular Retraction - 2 x daily - 7 x weekly - 1 sets - 10 reps - 5 sec hold        Therapeutic Exercise and NMR EXR  [x] (83581) Provided verbal/tactile cueing for activities related to strengthening, flexibility, endurance, ROM for improvements in  [] LE / Lumbar: LE, proximal hip, and core control with self care, mobility, lifting, ambulation. [x] UE / Cervical: cervical, postural, scapular, scapulothoracic and UE control with self care, reaching, carrying, lifting, house/yardwork, driving, computer work. [x] (67009) Provided verbal/tactile cueing for activities related to improving balance, coordination, kinesthetic sense, posture, motor skill, proprioception to assist with   [x] LE / lumbar: LE, proximal hip, and core control in self care, mobility, lifting, ambulation and eccentric single leg control. [] UE / cervical: cervical, scapular, scapulothoracic and UE control with self care, reaching, carrying, lifting, house/yardwork, driving, computer work.   [] (66236) Therapist is in constant attendance of 2 or more patients providing skilled therapy interventions, but not providing any significant amount of measurable one-on-one time to either patient, for improvements in  [] LE / lumbar: LE, proximal hip, and core control in self care, mobility, lifting, ambulation and eccentric single leg control. [] UE / cervical: cervical, scapular, scapulothoracic and UE control with self care, reaching, carrying, lifting, house/yardwork, driving, computer work.      NMR and Therapeutic Activities:    [] (26304 or 40458) Provided verbal/tactile cueing for activities related to improving balance, coordination, kinesthetic sense, posture, motor skill, proprioception and motor activation to allow for proper function of   [] LE: / Lumbar core, proximal hip and LE with self care and ADLs  [] UE / Cervical: cervical, postural, scapular, scapulothoracic and UE control with self care, carrying, lifting, driving, computer work.   [] (81478) Gait Re-education- Provided training and instruction to the patient for proper LE, core and proximal hip recruitment and work.     Modalities:  [] (70584) Vasopneumatic compression: Utilized vasopneumatic compression to decrease edema / swelling for the purpose of improving mobility and quad tone / recruitment which will allow for increased overall function including but not limited to self-care, transfers, ambulation, and ascending / descending stairs. Charges:  Timed Code Treatment Minutes: 42   Total Treatment Minutes: 42     [] EVAL - LOW (69379)   [] EVAL - MOD (25797)  [] EVAL - HIGH (74847)  [] RE-EVAL (90816)  [x] NT(55095) x 2      [] Ionto  [x] NMR (04649) x 1       [] Vaso  [] Manual (38235) x 1      [] Ultrasound  [] TA x      [] Mech Traction (94530)  [] Aquatic Therapy x      [] ES (un) (45310):   [] Home Management Training x  [] ES(attended) (55448)   [] Dry Needling 1-2 muscles (50671):  [] Dry Needling 3+ muscles (423057)  [] Group:      [] Other:     GOALS:   Patient stated goal: \" increased use of LUE\"  [x]? Progressing: []? Met: []? Not Met: []? Adjusted     Therapist goals for Patient:   Short Term Goals: To be achieved in: 2 weeks  1. Independent in HEP and progression per patient tolerance, in order to prevent re-injury. [x]? Progressing: []? Met: []? Not Met: []? Adjusted  2. Patient will have a decrease in pain to 0/10 of LUE to facilitate improvement in movement, function, and ADLs as indicated by QuickDASH. [x]? Progressing: []? Met: []? Not Met: []? Adjusted     Long Term Goals: To be achieved in: 5 weeks  1. Disability index score of 35% or less for the Quick DASH to assist with reaching prior pain-free ADLS. []? Progressing: [x]? Met: []? Not Met: []? Adjusted  2. Patient will demonstrate increased AROM to BUE in flexion and abduction by 10 deg in order to reach [place] with less pain. [x]? Progressing: []? Met: []? Not Met: []? Adjusted  3.  Patient will demonstrate an increase in Strength to 5 in BUE in order to be able to put dishes away and carry groceries with more ease and without increase pain or symptoms. [x]? Progressing: []? Met: []? Not Met: []? Adjusted  4. Patient will be able to wash her back without increased pain or reproduction of symptoms in   [x]? Progressing: []? Met: []? Not Met: []? Adjusted    Overall Progression Towards Functional goals/ Treatment Progress Update:  [] Patient is progressing as expected towards functional goals listed. [x] Progression is slowed due to complexities/Impairments listed. [] Progression has been slowed due to co-morbidities. [] Plan just implemented, too soon to assess goals progression <30days   [] Goals require adjustment due to lack of progress  [] Patient is not progressing as expected and requires additional follow up with physician  [] Other    Persisting Functional Limitations/Impairments:  [x]Sleeping [x]Sitting               [x]Standing []Transfers        []Walking []Kneeling               []Stairs []Squatting / bending   [x]ADLs [x]Reaching  [x]Lifting  [x]Housework  []Driving []Job related tasks  []Sports/Recreation []Other:        ASSESSMENT:  Pt able to complete session without increased pain but still demo's fatigue. Appropriate form with HEP exercises; pt edcuated on appropriate frequency and exercise selection. Pt has no questions or concerns at this time. Pt appropriate to d/c with HEP program for shoulder strengthening to further work on improve B shoulder strength and mobility at home. Treatment/Activity Tolerance:  [x] Patient able to complete tx  [] Patient limited by fatigue  [x] Patient limited by pain  [] Patient limited by other medical complications  [] Other:     Prognosis: [x] Good [] Fair  [] Poor    Patient Requires Follow-up: [x] Yes  [] No    Plan for next treatment session:    PLAN: See eval. PT 2x / week for 5 weeks. [] Continue per plan of care [] Alter current plan (see comments)  [] Plan of care initiated [] Hold pending MD visit [x] Discharge    Electronically signed by:  Theo Pradhan, PT, DPT, ATC  Note: If patient does not return for scheduled/ recommended follow up visits, this note will serve as a discharge from care along with most recent update on progress.

## 2022-03-21 ENCOUNTER — TELEPHONE (OUTPATIENT)
Dept: ORTHOPEDIC SURGERY | Age: 57
End: 2022-03-21

## 2022-03-21 DIAGNOSIS — S83.412A SPRAIN OF MEDIAL COLLATERAL LIGAMENT OF LEFT KNEE, INITIAL ENCOUNTER: Primary | ICD-10-CM

## 2022-03-21 NOTE — TELEPHONE ENCOUNTER
Called and spoke to son. Son stated patient is getting a burning pain in her stomach with the medication. Patient has been taking the medication with food. Notified son to have patient discontinue use of the medication and I will discuss with SMA and call them back tomorrow. Son understood.      **Discuss medication with SMA and call back**

## 2022-03-21 NOTE — TELEPHONE ENCOUNTER
General Question     Subject: Patient's son Janis Pickering called stating that the medication naproxen he believes is upsetting Bijan's stomach. States her stomach feels like it is burning. Would like a call back, maybe try a different medication.    Patient and /or Facility Request: Janis Pickering (son)  Contact Number: [phone]

## 2022-03-22 RX ORDER — MELOXICAM 7.5 MG/1
7.5 TABLET ORAL DAILY
Qty: 30 TABLET | Refills: 0 | Status: SHIPPED | OUTPATIENT
Start: 2022-03-22 | End: 2022-05-06

## 2022-03-22 NOTE — TELEPHONE ENCOUNTER
Prescription Refill     Medication Name:  ???  Pharmacy: [facility] - [address] [facility]  Patient Contact Number:  [phone]    PATIENT IS RETURNING CALL WITH NAME OF PHARMACY

## 2022-03-22 NOTE — TELEPHONE ENCOUNTER
Per SMA patient should discontinue Naproxen and we will send a script for mobic.  Patient should take mobic with an antiacid    **Please call and notify patient and confirm pharmacy**

## 2022-03-22 NOTE — TELEPHONE ENCOUNTER
Called and spoke to patient's son. Notified him that patient should discontinue naproxen and a script for mobic is being called into the requested pharmacy and that patient should take an OTC antiacid with the mobic. Patient's son understood.

## 2022-03-29 ENCOUNTER — TELEPHONE (OUTPATIENT)
Dept: INTERNAL MEDICINE CLINIC | Age: 57
End: 2022-03-29

## 2022-03-29 NOTE — TELEPHONE ENCOUNTER
Lissa with [facility] is checking on status of referral for home health he faxed for Kemar Morales to sign. His fax # is [phone]. I did advise him that Kemar Morales is not in the office today.

## 2022-03-30 NOTE — TELEPHONE ENCOUNTER
Called home health since there is no reason documented that would require home health for the pt.  They stated it was due to her back pain

## 2022-03-30 NOTE — TELEPHONE ENCOUNTER
The request was for an home health aide and when she was in a month ago for her elbow pain she did not complain of concerns requiring ADL assistance. If her back pain is uncontrolled then I recommend an appt to discuss a treatment plan as an aide will not help resolve her pain.   Nilam Lamar

## 2022-04-06 ENCOUNTER — OFFICE VISIT (OUTPATIENT)
Dept: INTERNAL MEDICINE CLINIC | Age: 57
End: 2022-04-06
Payer: COMMERCIAL

## 2022-04-06 VITALS
HEART RATE: 77 BPM | DIASTOLIC BLOOD PRESSURE: 78 MMHG | BODY MASS INDEX: 28.88 KG/M2 | OXYGEN SATURATION: 97 % | WEIGHT: 143 LBS | SYSTOLIC BLOOD PRESSURE: 124 MMHG

## 2022-04-06 DIAGNOSIS — G89.29 CHRONIC BILATERAL LOW BACK PAIN WITHOUT SCIATICA: ICD-10-CM

## 2022-04-06 DIAGNOSIS — S83.412A SPRAIN OF MEDIAL COLLATERAL LIGAMENT OF LEFT KNEE, INITIAL ENCOUNTER: ICD-10-CM

## 2022-04-06 DIAGNOSIS — M54.50 CHRONIC BILATERAL LOW BACK PAIN WITHOUT SCIATICA: ICD-10-CM

## 2022-04-06 DIAGNOSIS — Z65.8 INADEQUATE SOCIAL SUPPORT: ICD-10-CM

## 2022-04-06 DIAGNOSIS — M25.50 ARTHRALGIA OF MULTIPLE JOINTS: Primary | ICD-10-CM

## 2022-04-06 DIAGNOSIS — M25.50 ARTHRALGIA OF MULTIPLE JOINTS: ICD-10-CM

## 2022-04-06 LAB
ANION GAP SERPL CALCULATED.3IONS-SCNC: 11 MMOL/L (ref 3–16)
BASOPHILS ABSOLUTE: 0 K/UL (ref 0–0.2)
BASOPHILS RELATIVE PERCENT: 1 %
BUN BLDV-MCNC: 17 MG/DL (ref 7–20)
C-REACTIVE PROTEIN: 4.8 MG/L (ref 0–5.1)
CALCIUM SERPL-MCNC: 9.6 MG/DL (ref 8.3–10.6)
CHLORIDE BLD-SCNC: 101 MMOL/L (ref 99–110)
CO2: 23 MMOL/L (ref 21–32)
CREAT SERPL-MCNC: 0.5 MG/DL (ref 0.6–1.1)
EOSINOPHILS ABSOLUTE: 0.2 K/UL (ref 0–0.6)
EOSINOPHILS RELATIVE PERCENT: 4.2 %
GFR AFRICAN AMERICAN: >60
GFR NON-AFRICAN AMERICAN: >60
GLUCOSE BLD-MCNC: 104 MG/DL (ref 70–99)
HCT VFR BLD CALC: 43.4 % (ref 36–48)
HEMOGLOBIN: 15 G/DL (ref 12–16)
LYMPHOCYTES ABSOLUTE: 0.7 K/UL (ref 1–5.1)
LYMPHOCYTES RELATIVE PERCENT: 18.3 %
MCH RBC QN AUTO: 30.8 PG (ref 26–34)
MCHC RBC AUTO-ENTMCNC: 34.5 G/DL (ref 31–36)
MCV RBC AUTO: 89.4 FL (ref 80–100)
MONOCYTES ABSOLUTE: 0.4 K/UL (ref 0–1.3)
MONOCYTES RELATIVE PERCENT: 9.8 %
NEUTROPHILS ABSOLUTE: 2.6 K/UL (ref 1.7–7.7)
NEUTROPHILS RELATIVE PERCENT: 66.7 %
PDW BLD-RTO: 14.9 % (ref 12.4–15.4)
PLATELET # BLD: 210 K/UL (ref 135–450)
PMV BLD AUTO: 8.7 FL (ref 5–10.5)
POTASSIUM SERPL-SCNC: 4.6 MMOL/L (ref 3.5–5.1)
RBC # BLD: 4.86 M/UL (ref 4–5.2)
RHEUMATOID FACTOR: <10 IU/ML
SEDIMENTATION RATE, ERYTHROCYTE: 15 MM/HR (ref 0–30)
SODIUM BLD-SCNC: 135 MMOL/L (ref 136–145)
TSH SERPL DL<=0.05 MIU/L-ACNC: 2.05 UIU/ML (ref 0.27–4.2)
URIC ACID, SERUM: 5.7 MG/DL (ref 2.6–6)
WBC # BLD: 3.9 K/UL (ref 4–11)

## 2022-04-06 PROCEDURE — 99214 OFFICE O/P EST MOD 30 MIN: CPT | Performed by: NURSE PRACTITIONER

## 2022-04-06 PROCEDURE — 4004F PT TOBACCO SCREEN RCVD TLK: CPT | Performed by: NURSE PRACTITIONER

## 2022-04-06 PROCEDURE — 3017F COLORECTAL CA SCREEN DOC REV: CPT | Performed by: NURSE PRACTITIONER

## 2022-04-06 PROCEDURE — G8419 CALC BMI OUT NRM PARAM NOF/U: HCPCS | Performed by: NURSE PRACTITIONER

## 2022-04-06 PROCEDURE — G8427 DOCREV CUR MEDS BY ELIG CLIN: HCPCS | Performed by: NURSE PRACTITIONER

## 2022-04-06 RX ORDER — NAPROXEN 500 MG/1
TABLET ORAL
Qty: 60 TABLET | Refills: 0 | OUTPATIENT
Start: 2022-04-06

## 2022-04-06 ASSESSMENT — ENCOUNTER SYMPTOMS
WHEEZING: 0
SHORTNESS OF BREATH: 0
COUGH: 0
BACK PAIN: 1
CHEST TIGHTNESS: 0
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
## 2022-04-07 LAB — CYCLIC CITRULLINATED PEPTIDE ANTIBODY IGG: <0.5 U/ML (ref 0–2.9)

## 2022-04-07 RX ORDER — DULOXETIN HYDROCHLORIDE 30 MG/1
30 CAPSULE, DELAYED RELEASE ORAL DAILY
Qty: 30 CAPSULE | Refills: 3 | Status: SHIPPED | OUTPATIENT
Start: 2022-04-07 | End: 2022-07-12

## 2022-04-12 ENCOUNTER — OFFICE VISIT (OUTPATIENT)
Dept: ORTHOPEDIC SURGERY | Age: 57
End: 2022-04-12
Payer: COMMERCIAL

## 2022-04-12 VITALS — WEIGHT: 143 LBS | BODY MASS INDEX: 28.83 KG/M2 | HEIGHT: 59 IN

## 2022-04-12 DIAGNOSIS — G89.29 CHRONIC BILATERAL LOW BACK PAIN WITHOUT SCIATICA: ICD-10-CM

## 2022-04-12 DIAGNOSIS — M54.50 CHRONIC BILATERAL LOW BACK PAIN WITHOUT SCIATICA: ICD-10-CM

## 2022-04-12 DIAGNOSIS — S83.412A SPRAIN OF MEDIAL COLLATERAL LIGAMENT OF LEFT KNEE, INITIAL ENCOUNTER: Primary | ICD-10-CM

## 2022-04-12 PROCEDURE — 3017F COLORECTAL CA SCREEN DOC REV: CPT | Performed by: NURSE PRACTITIONER

## 2022-04-12 PROCEDURE — 99213 OFFICE O/P EST LOW 20 MIN: CPT | Performed by: NURSE PRACTITIONER

## 2022-04-12 PROCEDURE — 4004F PT TOBACCO SCREEN RCVD TLK: CPT | Performed by: NURSE PRACTITIONER

## 2022-04-12 PROCEDURE — G8427 DOCREV CUR MEDS BY ELIG CLIN: HCPCS | Performed by: NURSE PRACTITIONER

## 2022-04-12 PROCEDURE — G8419 CALC BMI OUT NRM PARAM NOF/U: HCPCS | Performed by: NURSE PRACTITIONER

## 2022-04-12 NOTE — PROGRESS NOTES
CHIEF COMPLAINT:   1-Left knee pain/MCL strain  2-Low back pain radiates to left leg/sciatica    Date of injury: 2/25/2022    HISTORY:  Ms. Leodan Dewitt 55 y.o. Persian female presents today for follow up visit for evaluation of left medial knee pain which started when she was doing her physical therapy for chronic low back pain and then had increased pain in her medial knee. At her last visit, she she was sent to a prednisone taper followed by NSAIDs, but she states that she thinks she took the prednisone and it caused her increased stomach upset but she is not sure. She is complaining of achy pain that is not improving. She rates her pain a 3/10 VAS. Pain is increase with standing and walking and decrease with rest. Pain is sharp early in the morning with first few steps, dull achy pain by the end of the day. Alleviating factors: rest.  She is also complaining of low back pain radiates down the leg. She has had this pain for 6 months. She does have intermittent numbness and tingling sensation that refers down her left leg into her foot. She denies catching or locking symptoms left knee. She has been working with PT. No other complaint. The patient chews tobacco.  She initially presented to [facility] on 2/28/2022 where she was x-rayed and instructed to follow-up with orthopedics      No past medical history on file.     Past Surgical History:   Procedure Laterality Date    SHOULDER SURGERY         Social History     Socioeconomic History    Marital status:      Spouse name: Not on file    Number of children: Not on file    Years of education: Not on file    Highest education level: Not on file   Occupational History    Not on file   Tobacco Use    Smoking status: Never Smoker    Smokeless tobacco: Current User     Types: Chew   Vaping Use    Vaping Use: Never used   Substance and Sexual Activity    Alcohol use: No    Drug use: Never    Sexual activity: Not on file   Other Topics Concern  Not on file   Social History Narrative    Not on file     Social Determinants of Health     Financial Resource Strain: Low Risk     Difficulty of Paying Living Expenses: Not hard at all   Food Insecurity: No Food Insecurity    Worried About Running Out of Food in the Last Year: Never true    [address] in the Last Year: Never true   Transportation Needs:     Lack of Transportation (Medical): Not on file    Lack of Transportation (Non-Medical):  Not on file   Physical Activity:     Days of Exercise per Week: Not on file    Minutes of Exercise per Session: Not on file   Stress:     Feeling of Stress : Not on file   Social Connections:     Frequency of Communication with Friends and Family: Not on file    Frequency of Social Gatherings with Friends and Family: Not on file    Attends Spiritism Services: Not on file    Active Member of [address] [place] [address] or Organizations: Not on file    Attends Club or Organization Meetings: Not on file    Marital Status: Not on file   Intimate Partner Violence:     Fear of Current or Ex-Partner: Not on file    Emotionally Abused: Not on file    Physically Abused: Not on file    Sexually Abused: Not on file   Housing Stability:     Unable to Pay for Housing in the Last Year: Not on file    Number of [place] in the Last Year: Not on file    Unstable Housing in the Last Year: Not on file       Family History   Problem Relation Age of Onset    Breast Cancer Sister        Current Outpatient Medications on File Prior to Visit   Medication Sig Dispense Refill    DULoxetine (CYMBALTA) 30 MG extended release capsule Take 1 capsule by mouth daily 30 capsule 3    meloxicam (MOBIC) 7.5 MG tablet Take 1 tablet by mouth daily 30 tablet 0    predniSONE (DELTASONE) 10 MG tablet take 3 tabs PO BID x2 days, then 2 tabs PO BID x2 days, then 1 tab PO BID x2 days, then 1 tab PO daily x2 days 26 tablet 0    diclofenac sodium (VOLTAREN) 1 % GEL Apply 4 g topically 4 times daily as needed for Pain 100 g 3     No current facility-administered medications on file prior to visit. Pertinent items are noted in HPI  Review of systems reviewed from Patient History Form and available in the patient's chart under the Media tab. PHYSICAL EXAMINATION:  Ms. Michael Mcclure is a very pleasant 55 y.o. Hebrew female who presents today in no acute distress, awake, alert, and oriented. She is well dressed, nourished and  groomed. Patient with normal affect. Height is  4' 11\" (1.499 m), weight is 143 lb (64.9 kg), Body mass index is 28.88 kg/m². Resting respiratory rate is 16. Examination of the gait, showed that the patient walks heel-toe with a non-antalgic gait and no limp. Examination of both knees showing full ROM, left mild crepitus, tenderness on medial joint line, stable to varus and valgus stress. She has intact sensation and good pedal pulses. She has good strength in 2 planes, and has mild tenderness on deep palpation over the medial joint line. Knee reflex 1+ bilaterally. She is tender to palpation over the left medial knee, over the left MCL. She has a positive straight leg raise on the left. She is tender to palpation over the lower lumbar spine. IMAGING:  Xray 3 views of the left knee was obtained 2/28/2022 from [facility] and reviewed. These demonstrate no significant DJD. IMPRESSION:   1-Left knee MCL strain. 2-Left sciatica    PLAN: I discussed with the patient the treatment options including both surgical and non-surgical treatment. We recommended Quad exercises and stretching of the calf and hamstrings which was taught to the patient today. I she was instructed to work on the range of motion strengthening exercises of her low back that were demonstrated to her today and to continue to work on the exercises that were given to her by physical therapy as the pain improves. No heavy impact activities.   Recommend getting an MRI of her left knee to further evaluate for internal derangement or MCL tear. Follow-up results.   She may need referral to the spine team.      Kana Merrill, APRN - CNP

## 2022-05-06 ENCOUNTER — HOSPITAL ENCOUNTER (EMERGENCY)
Age: 57
Discharge: HOME | End: 2022-05-06
Payer: COMMERCIAL

## 2022-05-06 ENCOUNTER — APPOINTMENT (OUTPATIENT)
Dept: ULTRASOUND IMAGING | Age: 57
End: 2022-05-06
Payer: COMMERCIAL

## 2022-05-06 ENCOUNTER — APPOINTMENT (OUTPATIENT)
Dept: GENERAL RADIOLOGY | Age: 57
End: 2022-05-06
Payer: COMMERCIAL

## 2022-05-06 ENCOUNTER — APPOINTMENT (OUTPATIENT)
Dept: CT IMAGING | Age: 57
End: 2022-05-06
Payer: COMMERCIAL

## 2022-05-06 VITALS
OXYGEN SATURATION: 97 % | HEART RATE: 83 BPM | RESPIRATION RATE: 16 BRPM | SYSTOLIC BLOOD PRESSURE: 126 MMHG | DIASTOLIC BLOOD PRESSURE: 71 MMHG | TEMPERATURE: 98.3 F

## 2022-05-06 DIAGNOSIS — R10.32 ABDOMINAL PAIN, LEFT LOWER QUADRANT: Primary | ICD-10-CM

## 2022-05-06 LAB
A/G RATIO: 1.6 (ref 1.1–2.2)
ALBUMIN SERPL-MCNC: 4.5 G/DL (ref 3.4–5)
ALP BLD-CCNC: 103 U/L (ref 40–129)
ALT SERPL-CCNC: 35 U/L (ref 10–40)
ANION GAP SERPL CALCULATED.3IONS-SCNC: 9 MMOL/L (ref 3–16)
AST SERPL-CCNC: 37 U/L (ref 15–37)
BASOPHILS ABSOLUTE: 0 K/UL (ref 0–0.2)
BASOPHILS RELATIVE PERCENT: 0.5 %
BILIRUB SERPL-MCNC: 0.7 MG/DL (ref 0–1)
BILIRUBIN URINE: NEGATIVE
BLOOD, URINE: NEGATIVE
BUN BLDV-MCNC: 11 MG/DL (ref 7–20)
CALCIUM SERPL-MCNC: 9.6 MG/DL (ref 8.3–10.6)
CHLORIDE BLD-SCNC: 104 MMOL/L (ref 99–110)
CLARITY: CLEAR
CO2: 24 MMOL/L (ref 21–32)
COLOR: YELLOW
CREAT SERPL-MCNC: 0.5 MG/DL (ref 0.6–1.1)
EKG ATRIAL RATE: 85 BPM
EKG DIAGNOSIS: NORMAL
EKG P AXIS: 50 DEGREES
EKG P-R INTERVAL: 130 MS
EKG Q-T INTERVAL: 386 MS
EKG QRS DURATION: 76 MS
EKG QTC CALCULATION (BAZETT): 459 MS
EKG R AXIS: -14 DEGREES
EKG T AXIS: 25 DEGREES
EKG VENTRICULAR RATE: 85 BPM
EOSINOPHILS ABSOLUTE: 0.3 K/UL (ref 0–0.6)
EOSINOPHILS RELATIVE PERCENT: 5.4 %
GFR AFRICAN AMERICAN: >60
GFR NON-AFRICAN AMERICAN: >60
GLUCOSE BLD-MCNC: 104 MG/DL (ref 70–99)
GLUCOSE URINE: NEGATIVE MG/DL
HCG QUALITATIVE: NEGATIVE
HCT VFR BLD CALC: 41.7 % (ref 36–48)
HEMOGLOBIN: 14.2 G/DL (ref 12–16)
KETONES, URINE: NEGATIVE MG/DL
LEUKOCYTE ESTERASE, URINE: NEGATIVE
LIPASE: 52 U/L (ref 13–60)
LYMPHOCYTES ABSOLUTE: 1 K/UL (ref 1–5.1)
LYMPHOCYTES RELATIVE PERCENT: 20.1 %
MCH RBC QN AUTO: 30.5 PG (ref 26–34)
MCHC RBC AUTO-ENTMCNC: 34.1 G/DL (ref 31–36)
MCV RBC AUTO: 89.5 FL (ref 80–100)
MICROSCOPIC EXAMINATION: NORMAL
MONOCYTES ABSOLUTE: 0.4 K/UL (ref 0–1.3)
MONOCYTES RELATIVE PERCENT: 8.4 %
NEUTROPHILS ABSOLUTE: 3.1 K/UL (ref 1.7–7.7)
NEUTROPHILS RELATIVE PERCENT: 65.6 %
NITRITE, URINE: NEGATIVE
PDW BLD-RTO: 14.1 % (ref 12.4–15.4)
PH UA: 5 (ref 5–8)
PLATELET # BLD: 199 K/UL (ref 135–450)
PMV BLD AUTO: 8.1 FL (ref 5–10.5)
POTASSIUM REFLEX MAGNESIUM: 4.5 MMOL/L (ref 3.5–5.1)
PRO-BNP: 107 PG/ML (ref 0–124)
PROTEIN UA: NEGATIVE MG/DL
RBC # BLD: 4.66 M/UL (ref 4–5.2)
SODIUM BLD-SCNC: 137 MMOL/L (ref 136–145)
SPECIFIC GRAVITY UA: 1.01 (ref 1–1.03)
TOTAL PROTEIN: 7.4 G/DL (ref 6.4–8.2)
TROPONIN: <0.01 NG/ML
URINE REFLEX TO CULTURE: NORMAL
URINE TYPE: NORMAL
UROBILINOGEN, URINE: 0.2 E.U./DL
WBC # BLD: 4.7 K/UL (ref 4–11)

## 2022-05-06 PROCEDURE — 6370000000 HC RX 637 (ALT 250 FOR IP): Performed by: PHYSICIAN ASSISTANT

## 2022-05-06 PROCEDURE — 93005 ELECTROCARDIOGRAM TRACING: CPT | Performed by: PHYSICIAN ASSISTANT

## 2022-05-06 PROCEDURE — 93010 ELECTROCARDIOGRAM REPORT: CPT | Performed by: INTERNAL MEDICINE

## 2022-05-06 PROCEDURE — 36415 COLL VENOUS BLD VENIPUNCTURE: CPT

## 2022-05-06 PROCEDURE — 74177 CT ABD & PELVIS W/CONTRAST: CPT

## 2022-05-06 PROCEDURE — 80053 COMPREHEN METABOLIC PANEL: CPT

## 2022-05-06 PROCEDURE — 83690 ASSAY OF LIPASE: CPT

## 2022-05-06 PROCEDURE — 84484 ASSAY OF TROPONIN QUANT: CPT

## 2022-05-06 PROCEDURE — 84703 CHORIONIC GONADOTROPIN ASSAY: CPT

## 2022-05-06 PROCEDURE — 93975 VASCULAR STUDY: CPT

## 2022-05-06 PROCEDURE — 71045 X-RAY EXAM CHEST 1 VIEW: CPT

## 2022-05-06 PROCEDURE — 6360000004 HC RX CONTRAST MEDICATION: Performed by: PHYSICIAN ASSISTANT

## 2022-05-06 PROCEDURE — 85025 COMPLETE CBC W/AUTO DIFF WBC: CPT

## 2022-05-06 PROCEDURE — 99285 EMERGENCY DEPT VISIT HI MDM: CPT

## 2022-05-06 PROCEDURE — 83880 ASSAY OF NATRIURETIC PEPTIDE: CPT

## 2022-05-06 PROCEDURE — 76830 TRANSVAGINAL US NON-OB: CPT

## 2022-05-06 PROCEDURE — 81003 URINALYSIS AUTO W/O SCOPE: CPT

## 2022-05-06 RX ORDER — ONDANSETRON 4 MG/1
4 TABLET, ORALLY DISINTEGRATING ORAL ONCE
Status: COMPLETED | OUTPATIENT
Start: 2022-05-06 | End: 2022-05-06

## 2022-05-06 RX ORDER — DICYCLOMINE HYDROCHLORIDE 10 MG/1
10 CAPSULE ORAL EVERY 6 HOURS PRN
Qty: 20 CAPSULE | Refills: 0 | Status: SHIPPED | OUTPATIENT
Start: 2022-05-06 | End: 2022-07-12

## 2022-05-06 RX ORDER — ONDANSETRON 4 MG/1
4 TABLET, ORALLY DISINTEGRATING ORAL EVERY 8 HOURS PRN
Qty: 20 TABLET | Refills: 0 | Status: SHIPPED | OUTPATIENT
Start: 2022-05-06 | End: 2022-07-09

## 2022-05-06 RX ORDER — HYDROCODONE BITARTRATE AND ACETAMINOPHEN 5; 325 MG/1; MG/1
1 TABLET ORAL ONCE
Status: COMPLETED | OUTPATIENT
Start: 2022-05-06 | End: 2022-05-06

## 2022-05-06 RX ORDER — OMEPRAZOLE 20 MG/1
20 CAPSULE, DELAYED RELEASE ORAL
Qty: 30 CAPSULE | Refills: 0 | Status: SHIPPED | OUTPATIENT
Start: 2022-05-06

## 2022-05-06 RX ADMIN — HYDROCODONE BITARTRATE AND ACETAMINOPHEN 1 TABLET: 5; 325 TABLET ORAL at 14:05

## 2022-05-06 RX ADMIN — ONDANSETRON 4 MG: 4 TABLET, ORALLY DISINTEGRATING ORAL at 14:05

## 2022-05-06 RX ADMIN — IOPAMIDOL 75 ML: 755 INJECTION, SOLUTION INTRAVENOUS at 14:28

## 2022-05-06 ASSESSMENT — ENCOUNTER SYMPTOMS
SHORTNESS OF BREATH: 0
CONSTIPATION: 0
ABDOMINAL DISTENTION: 0
NAUSEA: 0
DIARRHEA: 0
BACK PAIN: 0
ABDOMINAL PAIN: 1
RESPIRATORY NEGATIVE: 1
COUGH: 0
COLOR CHANGE: 0
VOMITING: 0
CHEST TIGHTNESS: 0

## 2022-05-06 NOTE — ED PROVIDER NOTES
EKG NOTE:    Sinus rhythm at a rate of 85 beats a minute with no acute ST elevations or depressions or pathologic Q waves. I was not involved with the care of this patient.        Erin Genao MD  05/06/22 8856

## 2022-05-06 NOTE — ED PROVIDER NOTES
[facility] [facility]        Pt Name: Gladys Nurse  MRN: [id number]  [place] 1965  Date of evaluation: 5/6/2022  Provider: UNA Hudson  PCP: HOWARD Luo CNP  Note Started: 1:43 PM EDT       CARRIE. I have evaluated this patient. My supervising physician was available for consultation. CHIEF COMPLAINT       Chief Complaint   Patient presents with    Abdominal Pain     Lashay Vick ([id number])  Urdu. Pt states LLQ abdominal pain for the past year. Pt states that in te past 2 days the pain has gotten worse. Pt states that she has not been seen before for this pain. HISTORY OF PRESENT ILLNESS   (Location, Timing/Onset, Context/Setting, Quality, Duration, Modifying Factors, Severity, Associated Signs and Symptoms)  Note limiting factors. Chief Complaint: Abd Pain     Bijan Martinez is a 55 y.o. female with no significant past medical history who presents to the ED with complaint of abdominal pain. Patient speaks primarily [place] and  was utilized for history and physical examination. States for the past year she has had slight pain that she rates as a 2/10 to the left lower quadrant of the abdomen. Denies any aggravating relieving factors to the pain. States yesterday pain became more intense and currently rated as a 9/10. Came to the ED for further evaluation and treatment. Denies chest pain, shortness of breath, nausea/vomiting, urinary symptoms or changes in bowel movements. Denies vaginal bleeding or vaginal discharge. Denies any fever or chills. Denies rashes or lesions. Denies any injury or trauma. Denies any known history of diverticulosis or diverticulitis. States she never had a colonoscopy. States has had a past surgical history for which she believes to be appendectomy and cholecystectomy in the past.  Denies any other surgeries to the abdomen.   Denies taking any over-the-counter medication for symptom control. Patient that she is also noticed over the past couple days that her bilateral hands of been swelling. She denies any chest pain or shortness of breath. Denies any leg swelling. Nursing Notes were all reviewed and agreed with or any disagreements were addressed in the HPI. REVIEW OF SYSTEMS    (2-9 systems for level 4, 10 or more for level 5)     Review of Systems   Constitutional: Negative for activity change, appetite change, chills, diaphoresis, fatigue and fever. Respiratory: Negative. Negative for cough, chest tightness and shortness of breath. Cardiovascular: Negative. Negative for chest pain, palpitations and leg swelling. Gastrointestinal: Positive for abdominal pain. Negative for abdominal distention, constipation, diarrhea, nausea and vomiting. Genitourinary: Negative for decreased urine volume, difficulty urinating, dysuria, flank pain, frequency, genital sores, hematuria, pelvic pain, urgency, vaginal bleeding, vaginal discharge and vaginal pain. Musculoskeletal: Negative for arthralgias, back pain, myalgias, neck pain and neck stiffness. Skin: Negative for color change, pallor, rash and wound. Neurological: Negative for dizziness, light-headedness and headaches. Positives and Pertinent negatives as per HPI. Except as noted above in the ROS, all other systems were reviewed and negative. PAST MEDICAL HISTORY   History reviewed. No pertinent past medical history.       SURGICAL HISTORY     Past Surgical History:   Procedure Laterality Date    SHOULDER SURGERY           CURRENTMEDICATIONS       Previous Medications    DICLOFENAC SODIUM (VOLTAREN) 1 % GEL    Apply 4 g topically 4 times daily as needed for Pain    DULOXETINE (CYMBALTA) 30 MG EXTENDED RELEASE CAPSULE    Take 1 capsule by mouth daily    PREDNISONE (DELTASONE) 10 MG TABLET    take 3 tabs PO BID x2 days, then 2 tabs PO BID x2 days, then 1 tab PO BID x2 days, then 1 tab PO daily x2 days         ALLERGIES     Patient has no known allergies. FAMILYHISTORY       Family History   Problem Relation Age of Onset    Breast Cancer Sister           SOCIAL HISTORY       Social History     Tobacco Use    Smoking status: Never Smoker    Smokeless tobacco: Current User     Types: Chew   Vaping Use    Vaping Use: Never used   Substance Use Topics    Alcohol use: No    Drug use: Never       SCREENINGS             PHYSICAL EXAM    (up to 7 for level 4, 8 or more for level 5)     ED Triage Vitals   BP Temp Temp Source Pulse Resp SpO2 Height Weight   05/06/22 1318 05/06/22 1330 05/06/22 1330 05/06/22 1318 05/06/22 1318 05/06/22 1318 -- --   126/71 98.3 °F (36.8 °C) Oral 83 16 97 %         Physical Exam  Constitutional:       General: She is not in acute distress. Appearance: She is well-developed. She is not ill-appearing, toxic-appearing or diaphoretic. HENT:      Head: Normocephalic and atraumatic. Right Ear: External ear normal.      Left Ear: External ear normal.   Eyes:      General:         Right eye: No discharge. Left eye: No discharge. Cardiovascular:      Rate and Rhythm: Normal rate and regular rhythm. Pulses: Normal pulses. Heart sounds: Normal heart sounds. No murmur heard. No friction rub. No gallop. Comments: 2+ radial pulses bilaterally. No pedal edema. No calf tenderness. No JVD. Pulmonary:      Effort: Pulmonary effort is normal. No respiratory distress. Breath sounds: Normal breath sounds. No stridor. No wheezing, rhonchi or rales. Chest:      Chest wall: No tenderness. Abdominal:      General: Abdomen is flat. Bowel sounds are normal. There is no distension. Palpations: Abdomen is soft. There is no mass. Tenderness: There is abdominal tenderness in the left lower quadrant. There is no right CVA tenderness, left CVA tenderness, guarding or rebound.  Negative signs include Espitia's sign, Rovsing's sign and McBurney's sign. Hernia: No hernia is present. Musculoskeletal:         General: Normal range of motion. Cervical back: Normal range of motion and neck supple. Skin:     General: Skin is warm and dry. Coloration: Skin is not pale. Findings: No erythema or rash. Neurological:      Mental Status: She is alert and oriented to person, place, and time. Psychiatric:         Behavior: Behavior normal.         DIAGNOSTIC RESULTS   LABS:    Labs Reviewed   COMPREHENSIVE METABOLIC PANEL W/ REFLEX TO MG FOR LOW K - Abnormal; Notable for the following components:       Result Value    Glucose 104 (*)     CREATININE 0.5 (*)     All other components within normal limits   TROPONIN   CBC WITH AUTO DIFFERENTIAL   LIPASE   URINALYSIS WITH REFLEX TO CULTURE   HCG, SERUM, QUALITATIVE   BRAIN NATRIURETIC PEPTIDE       When ordered only abnormal lab results are displayed. All other labs were within normal range or not returned as of this dictation. EKG: When ordered, EKG's are interpreted by the Emergency Department Physician in the absence of a cardiologist.  Please see their note for interpretation of EKG. RADIOLOGY:   Non-plain film images such as CT, Ultrasound and MRI are read by the radiologist. Plain radiographic images are visualized and preliminarily interpreted by the ED Provider with the below findings:        Interpretation per the Radiologist below, if available at the time of this note:    [facility]   Final Result   Unremarkable pelvic ultrasound. US DUP ABD PEL RETRO SCROT COMPLETE   Final Result      CT ABDOMEN PELVIS W IV CONTRAST Additional Contrast? None   Final Result   1. No acute abnormality. XR CHEST PORTABLE   Final Result   Unremarkable portable exam           No results found.         PROCEDURES   Unless otherwise noted below, none     Procedures    CRITICAL CARE TIME       CONSULTS:  None      EMERGENCY DEPARTMENT COURSE and DIFFERENTIAL DIAGNOSIS/MDM:   Vitals:    Vitals:    05/06/22 1318 05/06/22 1330   BP: 126/71    Pulse: 83    Resp: 16    Temp:  98.3 °F (36.8 °C)   TempSrc:  Oral   SpO2: 97%        Patient was given the following medications:  Medications   HYDROcodone-acetaminophen (NORCO) 5-325 MG per tablet 1 tablet (1 tablet Oral Given 5/6/22 1405)   ondansetron (ZOFRAN-ODT) disintegrating tablet 4 mg (4 mg Oral Given 5/6/22 1405)   iopamidol (ISOVUE-370) 76 % injection 75 mL (75 mLs IntraVENous Given 5/6/22 1428)           Patient is a 60-year-old female who presents to the ED with complaint of abdominal pain. Complaint of left lower quadrant abdominal pain that she states been ongoing for the past year but worsened over the past couple of days. Upon examination patient afebrile with stable vital signs. Nontoxic appearance. EKG obtained and interpreted by attending. Urinalysis showed no signs of infection. Troponin was normal.  CBC showed normal white count, hemoglobin and platelets. CMP relatively unremarkable other than glucose of 104. Lipase was normal.  Pregnancy was negative. BNP unremarkable. Chest x-ray showed no acute abnormality. CT of the abdomen pelvis showed no acute abnormality. Patient currently left lower quad abdominal pain ultrasound obtained to evaluate for ovarian cyst/torsion. Ultrasound showed no acute abnormality. Patient suffering from left lower quad abdominal pain of unclear etiology at this time. She is feeling better after oral Norco and Zofran here in the ED. Has repeat reassuring abdominal exam.  Believe can be safely discharged home with close outpatient follow-up by PCP and GI. Return the ED for any worsening symptoms.   Low suspicion for obstruction, peritonitis, perforation, pancreatitis, cholecystitis, appendicitis, diverticulitis, colitis, mesenteric ischemia, AAA, dissection, pyelonephritis, nephrolithiasis, TOA, PID, ovarian torsion, ectopic pregnancy or other emergent etiology at this time.  We will try Bentyl, Zofran and Prilosec for home for symptom control. FINAL IMPRESSION      1. Abdominal pain, left lower quadrant          DISPOSITION/PLAN   DISPOSITION Decision To Discharge 05/06/2022 05:27:30 PM      PATIENT REFERRED TO:  Cecelia Cowden, APRN - CNP  [facility]  [phone]    Schedule an appointment as soon as possible for a visit   For a Re-check in  3-5     days.     [facility] Emergency Department  [address]  [address] [address]  [phone]  Go to   As needed, If symptoms worsen    Danielle Aponte MD  [address] [facility]  [phone]    Schedule an appointment as soon as possible for a visit   As needed, If symptoms worsen      DISCHARGE MEDICATIONS:  New Prescriptions    DICYCLOMINE (BENTYL) 10 MG CAPSULE    Take 1 capsule by mouth every 6 hours as needed (cramps)    OMEPRAZOLE (PRILOSEC) 20 MG DELAYED RELEASE CAPSULE    Take 1 capsule by mouth every morning (before breakfast)    ONDANSETRON (ZOFRAN ODT) 4 MG DISINTEGRATING TABLET    Take 1 tablet by mouth every 8 hours as needed for Nausea       DISCONTINUED MEDICATIONS:  Discontinued Medications    MELOXICAM (MOBIC) 7.5 MG TABLET    Take 1 tablet by mouth daily              (Please note that portions of this note were completed with a voice recognition program.  Efforts were made to edit the dictations but occasionally words are mis-transcribed.)    UNA Arce (electronically signed)          UNA Geiger  05/06/22 2265

## 2022-05-06 NOTE — ED NOTES
Discharge instructions and prescriptions reviewed with pt and pts family. Pt and pts family allowed opportunity to ask questions and verbalized understanding.       Celia Hua) [address], RN  05/06/22 8859

## 2022-05-06 NOTE — ED NOTES
Pt became upset while talking to the .  Riki Piña ([id number]) explained to this RN that there was an issue with the sound and the pt misunderstood when he was asking if he needed to turn up the sound on the device, the pt thought that he was calling her deaf. The pt then refused to communicate back to the . Pt was instructed about the ekg process via  and explained the misunderstanding.       Lakesha Villagomez RN  05/06/22 0757

## 2022-06-02 LAB
EKG ATRIAL RATE: 85 BPM
EKG DIAGNOSIS: NORMAL
EKG P AXIS: 50 DEGREES
EKG P-R INTERVAL: 130 MS
EKG Q-T INTERVAL: 386 MS
EKG QRS DURATION: 76 MS
EKG QTC CALCULATION (BAZETT): 459 MS
EKG R AXIS: -14 DEGREES
EKG T AXIS: 25 DEGREES
EKG VENTRICULAR RATE: 85 BPM

## 2022-06-02 PROCEDURE — 93010 ELECTROCARDIOGRAM REPORT: CPT | Performed by: INTERNAL MEDICINE

## 2022-07-09 ENCOUNTER — APPOINTMENT (OUTPATIENT)
Dept: CT IMAGING | Age: 57
End: 2022-07-09
Payer: COMMERCIAL

## 2022-07-09 ENCOUNTER — HOSPITAL ENCOUNTER (EMERGENCY)
Age: 57
Discharge: HOME | End: 2022-07-09
Attending: EMERGENCY MEDICINE
Payer: COMMERCIAL

## 2022-07-09 ENCOUNTER — APPOINTMENT (OUTPATIENT)
Dept: GENERAL RADIOLOGY | Age: 57
End: 2022-07-09
Payer: COMMERCIAL

## 2022-07-09 VITALS
WEIGHT: 143 LBS | DIASTOLIC BLOOD PRESSURE: 80 MMHG | BODY MASS INDEX: 28.88 KG/M2 | SYSTOLIC BLOOD PRESSURE: 113 MMHG | RESPIRATION RATE: 25 BRPM | OXYGEN SATURATION: 97 % | TEMPERATURE: 98.8 F | HEART RATE: 84 BPM

## 2022-07-09 DIAGNOSIS — M25.50 ARTHRALGIA, UNSPECIFIED JOINT: ICD-10-CM

## 2022-07-09 DIAGNOSIS — R59.1 LYMPHADENOPATHY: ICD-10-CM
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
## 2022-07-09 DIAGNOSIS — R93.89 ABNORMAL CT SCAN: ICD-10-CM

## 2022-07-09 DIAGNOSIS — S16.1XXA STRAIN OF NECK MUSCLE, INITIAL ENCOUNTER: ICD-10-CM

## 2022-07-09 LAB
ANION GAP SERPL CALCULATED.3IONS-SCNC: 9 MMOL/L (ref 3–16)
BASOPHILS ABSOLUTE: 0 K/UL (ref 0–0.2)
BASOPHILS RELATIVE PERCENT: 0.5 %
BUN BLDV-MCNC: 13 MG/DL (ref 7–20)
CALCIUM SERPL-MCNC: 10.2 MG/DL (ref 8.3–10.6)
CHLORIDE BLD-SCNC: 103 MMOL/L (ref 99–110)
CO2: 28 MMOL/L (ref 21–32)
CREAT SERPL-MCNC: 0.6 MG/DL (ref 0.6–1.1)
EOSINOPHILS ABSOLUTE: 0.2 K/UL (ref 0–0.6)
EOSINOPHILS RELATIVE PERCENT: 3.3 %
GFR AFRICAN AMERICAN: >60
GFR NON-AFRICAN AMERICAN: >60
GLUCOSE BLD-MCNC: 102 MG/DL (ref 70–99)
HCT VFR BLD CALC: 41.9 % (ref 36–48)
HEMOGLOBIN: 14.4 G/DL (ref 12–16)
LYMPHOCYTES ABSOLUTE: 1 K/UL (ref 1–5.1)
LYMPHOCYTES RELATIVE PERCENT: 17.9 %
MCH RBC QN AUTO: 30.4 PG (ref 26–34)
MCHC RBC AUTO-ENTMCNC: 34.3 G/DL (ref 31–36)
MCV RBC AUTO: 88.7 FL (ref 80–100)
MONOCYTES ABSOLUTE: 0.3 K/UL (ref 0–1.3)
MONOCYTES RELATIVE PERCENT: 6 %
NEUTROPHILS ABSOLUTE: 3.9 K/UL (ref 1.7–7.7)
NEUTROPHILS RELATIVE PERCENT: 72.3 %
PDW BLD-RTO: 13.1 % (ref 12.4–15.4)
PLATELET # BLD: 239 K/UL (ref 135–450)
PMV BLD AUTO: 8.1 FL (ref 5–10.5)
POTASSIUM REFLEX MAGNESIUM: 4.3 MMOL/L (ref 3.5–5.1)
RBC # BLD: 4.73 M/UL (ref 4–5.2)
SODIUM BLD-SCNC: 140 MMOL/L (ref 136–145)
TROPONIN: <0.01 NG/ML
WBC # BLD: 5.5 K/UL (ref 4–11)

## 2022-07-09 PROCEDURE — 6360000002 HC RX W HCPCS: Performed by: EMERGENCY MEDICINE

## 2022-07-09 PROCEDURE — 84484 ASSAY OF TROPONIN QUANT: CPT

## 2022-07-09 PROCEDURE — 73562 X-RAY EXAM OF KNEE 3: CPT

## 2022-07-09 PROCEDURE — 99285 EMERGENCY DEPT VISIT HI MDM: CPT

## 2022-07-09 PROCEDURE — 73030 X-RAY EXAM OF SHOULDER: CPT

## 2022-07-09 PROCEDURE — 70450 CT HEAD/BRAIN W/O DYE: CPT

## 2022-07-09 PROCEDURE — 96374 THER/PROPH/DIAG INJ IV PUSH: CPT

## 2022-07-09 PROCEDURE — 85025 COMPLETE CBC W/AUTO DIFF WBC: CPT

## 2022-07-09 PROCEDURE — 96372 THER/PROPH/DIAG INJ SC/IM: CPT

## 2022-07-09 PROCEDURE — 2580000003 HC RX 258: Performed by: EMERGENCY MEDICINE

## 2022-07-09 PROCEDURE — 72125 CT NECK SPINE W/O DYE: CPT

## 2022-07-09 PROCEDURE — 71260 CT THORAX DX C+: CPT

## 2022-07-09 PROCEDURE — 73070 X-RAY EXAM OF ELBOW: CPT

## 2022-07-09 PROCEDURE — 6370000000 HC RX 637 (ALT 250 FOR IP): Performed by: EMERGENCY MEDICINE

## 2022-07-09 PROCEDURE — 6360000004 HC RX CONTRAST MEDICATION: Performed by: EMERGENCY MEDICINE

## 2022-07-09 PROCEDURE — 93005 ELECTROCARDIOGRAM TRACING: CPT | Performed by: EMERGENCY MEDICINE

## 2022-07-09 PROCEDURE — 80048 BASIC METABOLIC PNL TOTAL CA: CPT

## 2022-07-09 RX ORDER — ACETAMINOPHEN 325 MG/1
650 TABLET ORAL ONCE
Status: COMPLETED | OUTPATIENT
Start: 2022-07-09 | End: 2022-07-09

## 2022-07-09 RX ORDER — LIDOCAINE 50 MG/G
1 PATCH TOPICAL DAILY
Qty: 10 PATCH | Refills: 0 | Status: SHIPPED | OUTPATIENT
Start: 2022-07-09 | End: 2022-07-19

## 2022-07-09 RX ORDER — NAPROXEN 500 MG/1
500 TABLET ORAL 2 TIMES DAILY WITH MEALS
Qty: 20 TABLET | Refills: 0 | Status: SHIPPED | OUTPATIENT
Start: 2022-07-09 | End: 2022-07-19

## 2022-07-09 RX ORDER — KETOROLAC TROMETHAMINE 30 MG/ML
15 INJECTION, SOLUTION INTRAMUSCULAR; INTRAVENOUS ONCE
Status: COMPLETED | OUTPATIENT
Start: 2022-07-09 | End: 2022-07-09

## 2022-07-09 RX ORDER — ORPHENADRINE CITRATE 30 MG/ML
60 INJECTION INTRAMUSCULAR; INTRAVENOUS ONCE
Status: COMPLETED | OUTPATIENT
Start: 2022-07-09 | End: 2022-07-09

## 2022-07-09 RX ORDER — NAPROXEN 500 MG/1
500 TABLET ORAL 2 TIMES DAILY WITH MEALS
Qty: 20 TABLET | Refills: 0 | Status: SHIPPED | OUTPATIENT
Start: 2022-07-09 | End: 2022-07-09 | Stop reason: SDUPTHER

## 2022-07-09 RX ORDER — METHOCARBAMOL 750 MG/1
750-1500 TABLET, FILM COATED ORAL EVERY 8 HOURS PRN
Qty: 20 TABLET | Refills: 0 | Status: SHIPPED | OUTPATIENT
Start: 2022-07-09 | End: 2022-07-09 | Stop reason: SDUPTHER

## 2022-07-09 RX ORDER — 0.9 % SODIUM CHLORIDE 0.9 %
1000 INTRAVENOUS SOLUTION INTRAVENOUS ONCE
Status: COMPLETED | OUTPATIENT
Start: 2022-07-09 | End: 2022-07-09

## 2022-07-09 RX ORDER — ONDANSETRON 4 MG/1
4 TABLET, ORALLY DISINTEGRATING ORAL EVERY 8 HOURS PRN
Qty: 10 TABLET | Refills: 0 | Status: SHIPPED | OUTPATIENT
Start: 2022-07-09

## 2022-07-09 RX ORDER — METHOCARBAMOL 750 MG/1
750-1500 TABLET, FILM COATED ORAL EVERY 8 HOURS PRN
Qty: 20 TABLET | Refills: 0 | Status: SHIPPED | OUTPATIENT
Start: 2022-07-09 | End: 2022-07-14 | Stop reason: SDUPTHER

## 2022-07-09 RX ORDER — ONDANSETRON 2 MG/ML
4 INJECTION INTRAMUSCULAR; INTRAVENOUS ONCE
Status: COMPLETED | OUTPATIENT
Start: 2022-07-09 | End: 2022-07-09

## 2022-07-09 RX ORDER — AZITHROMYCIN 250 MG/1
250 TABLET, FILM COATED ORAL SEE ADMIN INSTRUCTIONS
Qty: 6 TABLET | Refills: 0 | Status: SHIPPED | OUTPATIENT
Start: 2022-07-09 | End: 2022-07-14

## 2022-07-09 RX ORDER — LIDOCAINE 50 MG/G
1 PATCH TOPICAL DAILY
Qty: 10 PATCH | Refills: 0 | Status: SHIPPED | OUTPATIENT
Start: 2022-07-09 | End: 2022-07-09 | Stop reason: SDUPTHER

## 2022-07-09 RX ADMIN — ACETAMINOPHEN 650 MG: 325 TABLET ORAL at 18:22

## 2022-07-09 RX ADMIN — SODIUM CHLORIDE 1000 ML: 9 INJECTION, SOLUTION INTRAVENOUS at 21:35

## 2022-07-09 RX ADMIN — IOPAMIDOL 50 ML: 755 INJECTION, SOLUTION INTRAVENOUS at 21:26

## 2022-07-09 RX ADMIN — ONDANSETRON 4 MG: 2 INJECTION INTRAMUSCULAR; INTRAVENOUS at 21:35

## 2022-07-09 RX ADMIN — ORPHENADRINE CITRATE 60 MG: 30 INJECTION INTRAMUSCULAR; INTRAVENOUS at 18:22

## 2022-07-09 RX ADMIN — KETOROLAC TROMETHAMINE 15 MG: 30 INJECTION, SOLUTION INTRAMUSCULAR at 18:22

## 2022-07-09 ASSESSMENT — PAIN - FUNCTIONAL ASSESSMENT: PAIN_FUNCTIONAL_ASSESSMENT: 0-10

## 2022-07-09 ASSESSMENT — LIFESTYLE VARIABLES: HOW OFTEN DO YOU HAVE A DRINK CONTAINING ALCOHOL: NEVER

## 2022-07-10 LAB
EKG ATRIAL RATE: 68 BPM
EKG DIAGNOSIS: NORMAL
EKG P AXIS: -22 DEGREES
EKG P-R INTERVAL: 128 MS
EKG Q-T INTERVAL: 414 MS
EKG QRS DURATION: 74 MS
EKG QTC CALCULATION (BAZETT): 440 MS
EKG R AXIS: -14 DEGREES
EKG T AXIS: 24 DEGREES
EKG VENTRICULAR RATE: 68 BPM

## 2022-07-10 PROCEDURE — 93010 ELECTROCARDIOGRAM REPORT: CPT | Performed by: INTERNAL MEDICINE

## 2022-07-10 NOTE — ED NOTES
Discharge and education instructions reviewed. Patient verbalized understanding, teach-back successful. Patient denied questions at this time. No acute distress noted. Patient instructed to follow-up as noted - return to emergency department if symptoms worsen. Patient verbalized understanding. Discharged per EDMD with discharged instructions.        Sugey Amos RN  07/09/22 5774
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
## 2022-07-12 ENCOUNTER — OFFICE VISIT (OUTPATIENT)
Dept: RHEUMATOLOGY | Age: 57
End: 2022-07-12
Payer: COMMERCIAL

## 2022-07-12 VITALS
BODY MASS INDEX: 28.19 KG/M2 | DIASTOLIC BLOOD PRESSURE: 80 MMHG | HEIGHT: 60 IN | SYSTOLIC BLOOD PRESSURE: 120 MMHG | WEIGHT: 143.6 LBS

## 2022-07-12 DIAGNOSIS — M25.50 MULTIPLE JOINT PAIN: Primary | ICD-10-CM

## 2022-07-12 DIAGNOSIS — M79.10 MUSCLE PAIN: ICD-10-CM

## 2022-07-12 DIAGNOSIS — M25.50 MULTIPLE JOINT PAIN: ICD-10-CM

## 2022-07-12 LAB
C-REACTIVE PROTEIN: 3.6 MG/L (ref 0–5.1)
HEPATITIS B CORE IGM ANTIBODY: NORMAL
HEPATITIS B SURFACE ANTIGEN INTERPRETATION: NORMAL
HEPATITIS C ANTIBODY INTERPRETATION: NORMAL
SEDIMENTATION RATE, ERYTHROCYTE: 15 MM/HR (ref 0–30)
TOTAL CK: 93 U/L (ref 26–192)
VITAMIN B-12: 696 PG/ML (ref 211–911)
VITAMIN D 25-HYDROXY: 28.1 NG/ML

## 2022-07-12 PROCEDURE — 99243 OFF/OP CNSLTJ NEW/EST LOW 30: CPT | Performed by: INTERNAL MEDICINE

## 2022-07-12 PROCEDURE — G8427 DOCREV CUR MEDS BY ELIG CLIN: HCPCS | Performed by: INTERNAL MEDICINE

## 2022-07-12 PROCEDURE — G8419 CALC BMI OUT NRM PARAM NOF/U: HCPCS | Performed by: INTERNAL MEDICINE

## 2022-07-12 NOTE — LETTER
[facility]  [address]  [address]  Phone: [phone]  Fax: [phone]    Lore Rothman MD    July 12, 2022     Allie George, APRN - CNP  [address] [facility]    Patient: Joe Talbert   MR Number: [id number]   YOB: 1965   Date of Visit: 7/12/2022       Dear Allie George: Thank you for referring Teri Blackburn to me for evaluation/treatment. Below are the relevant portions of my assessment and plan of care. If you have questions, please do not hesitate to call me. I look forward to following Bijan along with you.     Sincerely,      Lore Rothman MD
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
## 2022-07-14 ENCOUNTER — OFFICE VISIT (OUTPATIENT)
Dept: INTERNAL MEDICINE CLINIC | Age: 57
End: 2022-07-14
Payer: COMMERCIAL

## 2022-07-14 VITALS
WEIGHT: 142 LBS | HEART RATE: 78 BPM | BODY MASS INDEX: 27.73 KG/M2 | SYSTOLIC BLOOD PRESSURE: 118 MMHG | DIASTOLIC BLOOD PRESSURE: 74 MMHG | OXYGEN SATURATION: 99 %

## 2022-07-14 DIAGNOSIS — G89.29 CHRONIC BILATERAL LOW BACK PAIN WITHOUT SCIATICA: ICD-10-CM

## 2022-07-14 DIAGNOSIS — R42 POST-CONCUSSION VERTIGO: ICD-10-CM

## 2022-07-14 DIAGNOSIS — M25.50 ARTHRALGIA OF MULTIPLE JOINTS: ICD-10-CM

## 2022-07-14 DIAGNOSIS — S13.4XXD WHIPLASH INJURY TO NECK, SUBSEQUENT ENCOUNTER: ICD-10-CM

## 2022-07-14 DIAGNOSIS — M54.50 CHRONIC BILATERAL LOW BACK PAIN WITHOUT SCIATICA: ICD-10-CM

## 2022-07-14 DIAGNOSIS — R59.0 LYMPHADENOPATHY, MEDIASTINAL: ICD-10-CM

## 2022-07-14 DIAGNOSIS — M25.511 ACUTE PAIN OF BOTH SHOULDERS: ICD-10-CM
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
## 2022-07-14 DIAGNOSIS — F07.81 POST-CONCUSSION VERTIGO: ICD-10-CM

## 2022-07-14 DIAGNOSIS — M25.512 ACUTE PAIN OF BOTH SHOULDERS: ICD-10-CM

## 2022-07-14 PROCEDURE — G8419 CALC BMI OUT NRM PARAM NOF/U: HCPCS | Performed by: NURSE PRACTITIONER

## 2022-07-14 PROCEDURE — 3017F COLORECTAL CA SCREEN DOC REV: CPT | Performed by: NURSE PRACTITIONER

## 2022-07-14 PROCEDURE — 99214 OFFICE O/P EST MOD 30 MIN: CPT | Performed by: NURSE PRACTITIONER

## 2022-07-14 PROCEDURE — G8427 DOCREV CUR MEDS BY ELIG CLIN: HCPCS | Performed by: NURSE PRACTITIONER

## 2022-07-14 PROCEDURE — 4004F PT TOBACCO SCREEN RCVD TLK: CPT | Performed by: NURSE PRACTITIONER

## 2022-07-14 RX ORDER — DULOXETIN HYDROCHLORIDE 30 MG/1
30 CAPSULE, DELAYED RELEASE ORAL 2 TIMES DAILY
Qty: 60 CAPSULE | Refills: 3 | Status: SHIPPED | OUTPATIENT
Start: 2022-07-14

## 2022-07-14 RX ORDER — MECLIZINE HYDROCHLORIDE 25 MG/1
25 TABLET ORAL 3 TIMES DAILY PRN
Qty: 30 TABLET | Refills: 0 | Status: SHIPPED | OUTPATIENT
Start: 2022-07-14 | End: 2022-07-24

## 2022-07-14 RX ORDER — METHOCARBAMOL 750 MG/1
750-1500 TABLET, FILM COATED ORAL EVERY 8 HOURS PRN
Qty: 20 TABLET | Refills: 0 | Status: SHIPPED | OUTPATIENT
Start: 2022-07-14 | End: 2022-07-24

## 2022-07-14 ASSESSMENT — PATIENT HEALTH QUESTIONNAIRE - PHQ9
SUM OF ALL RESPONSES TO PHQ QUESTIONS 1-9: 0
2. FEELING DOWN, DEPRESSED OR HOPELESS: 0
SUM OF ALL RESPONSES TO PHQ QUESTIONS 1-9: 0
1. LITTLE INTEREST OR PLEASURE IN DOING THINGS: 0
SUM OF ALL RESPONSES TO PHQ9 QUESTIONS 1 & 2: 0
SUM OF ALL RESPONSES TO PHQ QUESTIONS 1-9: 0
SUM OF ALL RESPONSES TO PHQ QUESTIONS 1-9: 0
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
## 2022-07-15 LAB
ANA PATTERN: ABNORMAL
ANA TITER: ABNORMAL
ANTINUCLEAR AB INTERPRETIVE COMMENT: ABNORMAL
ANTINUCLEAR ANTIBODY, HEP-2, IGG: DETECTED

## 2022-07-18 DIAGNOSIS — R76.8 POSITIVE ANA (ANTINUCLEAR ANTIBODY): Primary | ICD-10-CM

## 2022-07-25 ENCOUNTER — HOSPITAL ENCOUNTER (OUTPATIENT)
Dept: PHYSICAL THERAPY | Age: 57
Setting detail: THERAPIES SERIES
Discharge: HOME | End: 2022-07-25

## 2022-07-25 NOTE — FLOWSHEET NOTE
[address] [facility]    Physical Therapy  Cancellation/No-show Note  Patient Name:  Jennifer Meredith  :  1965   Date:  2022    Cancelled visits to date: 0  No-shows to date: 1    For today's appointment patient:  []  Cancelled  []  Rescheduled appointment  [x]  No-show  (EVAL)     Reason given by patient:  []  Patient ill  []  Conflicting appointment  []  No transportation    []  Conflict with work  [x]  No reason given  []  Other:     Comments:      Phone call information:   []  Phone call made today to patient at _ time at number provided:      []  Patient answered, conversation as follows:    []  Patient did not answer, message left as follows:  [x]  Phone call not made today  []  Phone call not needed - pt contacted us to cancel and provided reason for cancellation. Electronically signed by:   Gomez Campbell, PT, PT

## 2022-07-28 ENCOUNTER — TELEPHONE (OUTPATIENT)
Dept: INTERNAL MEDICINE CLINIC | Age: 57
End: 2022-07-28

## 2022-07-28 NOTE — TELEPHONE ENCOUNTER
[facility] [employer] is calling need order for Pet CT and most recent office notes faxed to [phone]  thanks.

## 2022-07-29 ENCOUNTER — TELEPHONE (OUTPATIENT)
Dept: INTERNAL MEDICINE CLINIC | Age: 57
End: 2022-07-29

## 2022-07-29 DIAGNOSIS — R59.0 LYMPHADENOPATHY, MEDIASTINAL: Primary | ICD-10-CM

## 2022-07-29 DIAGNOSIS — R59.0 SUPRACLAVICULAR ADENOPATHY: ICD-10-CM

## 2022-07-29 NOTE — TELEPHONE ENCOUNTER
PET was recommended - denial noted. Please let pt know and provide with surgery referral to have biopsy complete - she needs to call for appt.   [facility] -Jennifer Bee MD   [address], [facility]   Ph: [phone]

## 2022-07-29 NOTE — TELEPHONE ENCOUNTER
[facility] calling to let you know pt insurance denied the [place] [facility] ---wants biopsy done .   thanks

## 2022-08-03 ENCOUNTER — TELEPHONE (OUTPATIENT)
Dept: INTERNAL MEDICINE CLINIC | Age: 57
End: 2022-08-03

## 2022-08-04 NOTE — TELEPHONE ENCOUNTER
[facility] calling again about Plan of Care from 6/7-8/5  said it was faxed 6/6----I did not see a previous message to check---please let them know when can be sent back ---Thanks.

## 2022-08-04 NOTE — TELEPHONE ENCOUNTER
If this was faxed 6/6 that means we did not get it or it would have been sent back by now. We received 3 different faxed order in our faxes today that were all faxed on and dated for 8/2/22. This is the first we have received any of these. Will put in sign folder for Scott Degroot when she returns on 8/11/22.

## 2022-08-09 ENCOUNTER — HOSPITAL ENCOUNTER (OUTPATIENT)
Dept: PHYSICAL THERAPY | Age: 57
Setting detail: THERAPIES SERIES
Discharge: HOME | End: 2022-08-09
Payer: COMMERCIAL

## 2022-08-09 PROCEDURE — 97163 PT EVAL HIGH COMPLEX 45 MIN: CPT

## 2022-08-09 PROCEDURE — 97530 THERAPEUTIC ACTIVITIES: CPT
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
## 2022-08-11 ENCOUNTER — OFFICE VISIT (OUTPATIENT)
Dept: SURGERY | Age: 57
End: 2022-08-11
Payer: COMMERCIAL

## 2022-08-11 VITALS — SYSTOLIC BLOOD PRESSURE: 110 MMHG | WEIGHT: 142 LBS | DIASTOLIC BLOOD PRESSURE: 78 MMHG | BODY MASS INDEX: 27.73 KG/M2

## 2022-08-11 DIAGNOSIS — R59.9 ADENOPATHY: ICD-10-CM

## 2022-08-11 DIAGNOSIS — R22.1 NECK MASS: ICD-10-CM

## 2022-08-11 DIAGNOSIS — R59.0 MEDIASTINAL ADENOPATHY: Primary | ICD-10-CM

## 2022-08-11 PROCEDURE — G8419 CALC BMI OUT NRM PARAM NOF/U: HCPCS | Performed by: SURGERY

## 2022-08-11 PROCEDURE — G8427 DOCREV CUR MEDS BY ELIG CLIN: HCPCS | Performed by: SURGERY

## 2022-08-11 PROCEDURE — 99203 OFFICE O/P NEW LOW 30 MIN: CPT | Performed by: SURGERY

## 2022-08-11 ASSESSMENT — ENCOUNTER SYMPTOMS
ALLERGIC/IMMUNOLOGIC NEGATIVE: 1
GASTROINTESTINAL NEGATIVE: 1
EYES NEGATIVE: 1
RESPIRATORY NEGATIVE: 1
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
## 2022-08-18 ENCOUNTER — HOSPITAL ENCOUNTER (OUTPATIENT)
Dept: PHYSICAL THERAPY | Age: 57
Setting detail: THERAPIES SERIES
Discharge: HOME | End: 2022-08-18
Payer: COMMERCIAL

## 2022-08-18 PROCEDURE — 97110 THERAPEUTIC EXERCISES: CPT

## 2022-08-18 NOTE — FLOWSHEET NOTE
Therapeutic Exercises (07601) Resistance / level Sets/sec Reps Notes   Nustep 1.0 4 min     Pulley 3' 10     Wall slides : Left shoulder Flx  right  hand over left 3' 10     IB/HR  2/30\", 2/10      HSS  Hip flexor  2/20'  2/20'     Supine:  LTR     10    Heel slides  TKE  LAQ    NPV                 Therapeutic Activities (50393)                                          Neuromuscular Re-ed (06067)       VOR       Quarter turns                                   Manual Intervention (04115)       PROM shoulder    NPV   Passive piriformis stretch   2/20\" 8/18 added                                   Modalities:     Pt. Education:  -patient educated on diagnosis, prognosis and expectations for rehab  -all patient questions were answered    Home Exercise Program:  TBD      Therapeutic Exercise and NMR EXR  [] (89246) Provided verbal/tactile cueing for activities related to strengthening, flexibility, endurance, ROM for improvements in  [] LE / Lumbar: LE, proximal hip, and core control with self care, mobility, lifting, ambulation. [] UE / Cervical: cervical, postural, scapular, scapulothoracic and UE control with self care, reaching, carrying, lifting, house/yardwork, driving, computer work.  [] (89068) Provided verbal/tactile cueing for activities related to improving balance, coordination, kinesthetic sense, posture, motor skill, proprioception to assist with   [] LE / lumbar: LE, proximal hip, and core control in self care, mobility, lifting, ambulation and eccentric single leg control.    [] UE / cervical: cervical, scapular, scapulothoracic and UE control with self care, reaching, carrying, lifting, house/yardwork, driving, computer work.   [] (65416) Therapist is in constant attendance of 2 or more patients providing skilled therapy interventions, but not providing any significant amount of measurable one-on-one time to either patient, for improvements in  [] LE / lumbar: LE, proximal hip, and core control in self care, mobility, lifting, ambulation and eccentric single leg control. [] UE / cervical: cervical, scapular, scapulothoracic and UE control with self care, reaching, carrying, lifting, house/yardwork, driving, computer work. NMR and Therapeutic Activities:    [] (90825 or 08649) Provided verbal/tactile cueing for activities related to improving balance, coordination, kinesthetic sense, posture, motor skill, proprioception and motor activation to allow for proper function of   [] LE: / Lumbar core, proximal hip and LE with self care and ADLs  [] UE / Cervical: cervical, postural, scapular, scapulothoracic and UE control with self care, carrying, lifting, driving, computer work.   [] (96759) Gait Re-education- Provided training and instruction to the patient for proper LE, core and proximal hip recruitment and positioning and eccentric body weight control with ambulation re-education including up and down stairs     Home Management Training / Self Care:  [] (13783) Provided self-care/home management training related to activities of daily living and compensatory training, and/or use of adaptive equipment for improvement with: ADLs and compensatory training, meal preparation, safety procedures and instruction in use of adaptive equipment, including bathing, grooming, dressing, personal hygiene, basic household cleaning and chores.      Home Exercise Program:    [x] (70166) Reviewed/Progressed HEP activities related to strengthening, flexibility, endurance, ROM of   [] LE / Lumbar: core, proximal hip and LE for functional self-care, mobility, lifting and ambulation/stair navigation   [] UE / Cervical: cervical, postural, scapular, scapulothoracic and UE control with self care, reaching, carrying, lifting, house/yardwork, driving, computer work  [] (81862)Reviewed/Progressed HEP activities related to improving balance, coordination, kinesthetic sense, posture, motor skill, proprioception of   [] LE: core, proximal hip and LE for self care, mobility, lifting, and ambulation/stair navigation    [] UE / Cervical: cervical, postural,  scapular, scapulothoracic and UE control with self care, reaching, carrying, lifting, house/yardwork, driving, computer work    Manual Treatments:  PROM / STM / Oscillations-Mobs:  G-I, II, III, IV (PA's, Inf., Post.)  [] (65227) Provided manual therapy to mobilize LE, proximal hip and/or LS spine soft tissue/joints for the purpose of modulating pain, promoting relaxation,  increasing ROM, reducing/eliminating soft tissue swelling/inflammation/restriction, improving soft tissue extensibility and allowing for proper ROM for normal function with   [] LE / lumbar: self care, mobility, lifting and ambulation. [] UE / Cervical: self care, reaching, carrying, lifting, house/yardwork, driving, computer work. Modalities:  [] (64040) Vasopneumatic compression: Utilized vasopneumatic compression to decrease edema / swelling for the purpose of improving mobility and quad tone / recruitment which will allow for increased overall function including but not limited to self-care, transfers, ambulation, and ascending / descending stairs. Charges:  Timed Code Treatment Minutes: 39   Total Treatment Minutes: 39     [] EVAL - LOW (88409)   [] EVAL - MOD (38552)  [] EVAL - HIGH (41675)  [] RE-EVAL (10201)  [x] LN(75319) x 3      [] Ionto  [] NMR (63535) x       [] Vaso  [] Manual (11804) x       [] Ultrasound  [] TA x        [] Mech Traction (35728)  [] Aquatic Therapy x     [] ES (un) (33784):   [] Home Management Training x  [] ES(attended) (30255)   [] Dry Needling 1-2 muscles (66469):  [] Dry Needling 3+ muscles (363984)  [] Group:      [] Other:     GOALS:    Patient stated goal:  [] Progressing: [] Met: [] Not Met: [] Adjusted     Therapist goals for Patient:  Short Term Goals: To be achieved in: 2 weeks  1.  Independent in HEP and progression per patient tolerance, in order to prevent re-injury. [] Progressing: [] Met: [] Not Met: [] Adjusted  2. Patient will have a decrease in pain to facilitate improvement in movement, function, and ADLs as indicated by Functional Deficits. [] Progressing: [] Met: [] Not Met: [] Adjusted     Long Term Goals: To be achieved in:  6 weeks/ DC   1. FOTO score of at 47 least  to assist with reaching prior level of function with ADLs. [] Progressing: [] Met: [] Not Met: [] Adjusted  2. Patient will demonstrate increased cervical AROM in all planes by 5 deg  allow for proper joint functioning as indicated by patients Functional Deficits. [] Progressing: [] Met: [] Not Met: [] Adjusted  3. Patient will demonstrate an increase in Strength to good proximal hip and core activation to allow for proper functional mobility as indicated by patients Functional Deficits. [] Progressing: [] Met: [] Not Met: [] Adjusted  4. Patient will return to functional activities including bending and lifting moderate to heavy objects without increased symptoms or restriction. [] Progressing: [] Met: [] Not Met: [] Adjusted  5. Patient tolerate standing > 20 min to carryout household chores without symptoms. [] Progressing: [] Met: [] Not Met: [] Adjusted       Overall Progression Towards Functional goals/ Treatment Progress Update:  [] Patient is progressing as expected towards functional goals listed. [] Progression is slowed due to complexities/Impairments listed. [] Progression has been slowed due to co-morbidities.   [x] Plan just implemented, too soon to assess goals progression <30days   [] Goals require adjustment due to lack of progress  [] Patient is not progressing as expected and requires additional follow up with physician  [] Other    Persisting Functional Limitations/Impairments:  [x]Sleeping []Sitting               [x]Standing []Transfers        []Walking [x]Kneeling               []Stairs [x]Squatting / bending   []ADLs [x]Reaching  []Lifting [x]Housework  []Driving []Job related tasks  []Sports/Recreation []Other:        ASSESSMENT:  Patient began generalized stretching with UE/LEs to create mobility and reduce tension at her joints. Pt had noticeable discomfort at left knee, B shoulders , and low back. Educated patient the importance of mobility and stretching at home. Pt to benefit from slow progression of stretching and strengthening of  UE/LE . Treatment/Activity Tolerance:  [] Patient able to complete tx [] Patient limited by fatigue  [] Patient limited by pain  [] Patient limited by other medical complications  [] Other:     Prognosis: [] Good [] Fair  [] Poor    Patient Requires Follow-up: [x] Yes  [] No    Plan for next treatment session:   Began general stretching and ROM of shoulder, hip, and LB    PLAN: See eval. PT 2x / week for 6 weeks. [x] Continue per plan of care [] Alter current plan (see comments)  [] Plan of care initiated [] Hold pending MD visit [] Discharge    Electronically signed by: Dominique Giron, PT PT, DPT    Note: If patient does not return for scheduled/ recommended follow up visits, this note will serve as a discharge from care along with most recent update on progress.

## 2022-08-24 ENCOUNTER — HOSPITAL ENCOUNTER (OUTPATIENT)
Dept: PHYSICAL THERAPY | Age: 57
Setting detail: THERAPIES SERIES
Discharge: HOME | End: 2022-08-24
Payer: COMMERCIAL

## 2022-08-24 PROCEDURE — 97110 THERAPEUTIC EXERCISES: CPT

## 2022-08-24 PROCEDURE — 97140 MANUAL THERAPY 1/> REGIONS: CPT

## 2022-08-24 PROCEDURE — 97112 NEUROMUSCULAR REEDUCATION: CPT
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
## 2022-08-31 ENCOUNTER — HOSPITAL ENCOUNTER (OUTPATIENT)
Dept: PHYSICAL THERAPY | Age: 57
Setting detail: THERAPIES SERIES
Discharge: HOME | End: 2022-08-31
Payer: COMMERCIAL

## 2022-08-31 PROCEDURE — 97530 THERAPEUTIC ACTIVITIES: CPT

## 2022-08-31 PROCEDURE — 97110 THERAPEUTIC EXERCISES: CPT
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
## 2022-09-07 ENCOUNTER — HOSPITAL ENCOUNTER (OUTPATIENT)
Dept: PHYSICAL THERAPY | Age: 57
Setting detail: THERAPIES SERIES
Discharge: HOME | End: 2022-09-07
Payer: OTHER MISCELLANEOUS

## 2022-09-07 PROCEDURE — 97530 THERAPEUTIC ACTIVITIES: CPT

## 2022-09-07 PROCEDURE — 97110 THERAPEUTIC EXERCISES: CPT
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
## 2022-09-08 ENCOUNTER — TELEPHONE (OUTPATIENT)
Dept: INTERNAL MEDICINE CLINIC | Age: 57
End: 2022-09-08

## 2022-09-08 NOTE — TELEPHONE ENCOUNTER
Kathy Rao states she did not sign the forms because the patient was only supposed to have this for a very short time. Will hold for Ian Showers.

## 2022-09-08 NOTE — TELEPHONE ENCOUNTER
Linda from [facility] calling about Plan of Care papers from 6/7/22 and 8/6/22  they need these orders signed and returned---said pt services have been cx until they can get these papers signed---any questions please call him at [phone]. Thanks.

## 2022-09-14 ENCOUNTER — HOSPITAL ENCOUNTER (OUTPATIENT)
Dept: PHYSICAL THERAPY | Age: 57
Setting detail: THERAPIES SERIES
Discharge: HOME | End: 2022-09-14
Payer: OTHER MISCELLANEOUS

## 2022-09-14 PROCEDURE — 97110 THERAPEUTIC EXERCISES: CPT

## 2022-09-14 PROCEDURE — 97140 MANUAL THERAPY 1/> REGIONS: CPT
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
## 2022-09-16 ENCOUNTER — OFFICE VISIT (OUTPATIENT)
Dept: PULMONOLOGY | Age: 57
End: 2022-09-16
Payer: COMMERCIAL

## 2022-09-16 VITALS
SYSTOLIC BLOOD PRESSURE: 110 MMHG | DIASTOLIC BLOOD PRESSURE: 70 MMHG | OXYGEN SATURATION: 98 % | HEIGHT: 60 IN | BODY MASS INDEX: 27.68 KG/M2 | WEIGHT: 141 LBS | HEART RATE: 81 BPM

## 2022-09-16 DIAGNOSIS — R59.0 SUPRACLAVICULAR ADENOPATHY: ICD-10-CM

## 2022-09-16 DIAGNOSIS — R59.0 MEDIASTINAL ADENOPATHY: Primary | ICD-10-CM

## 2022-09-16 PROCEDURE — G8419 CALC BMI OUT NRM PARAM NOF/U: HCPCS | Performed by: INTERNAL MEDICINE

## 2022-09-16 PROCEDURE — G8427 DOCREV CUR MEDS BY ELIG CLIN: HCPCS | Performed by: INTERNAL MEDICINE

## 2022-09-16 PROCEDURE — 99204 OFFICE O/P NEW MOD 45 MIN: CPT | Performed by: INTERNAL MEDICINE

## 2022-09-16 ASSESSMENT — ENCOUNTER SYMPTOMS
CHEST TIGHTNESS: 0
APNEA: 0
ANAL BLEEDING: 0
ABDOMINAL PAIN: 0
STRIDOR: 0
COUGH: 1
BLOOD IN STOOL: 0
SORE THROAT: 0
ABDOMINAL DISTENTION: 0
VOICE CHANGE: 0
DIARRHEA: 0
RHINORRHEA: 0
CHOKING: 0
CONSTIPATION: 0
SINUS PRESSURE: 0
SHORTNESS OF BREATH: 0
WHEEZING: 0
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
## 2022-09-21 ENCOUNTER — HOSPITAL ENCOUNTER (OUTPATIENT)
Dept: PHYSICAL THERAPY | Age: 57
Setting detail: THERAPIES SERIES
Discharge: HOME | End: 2022-09-21
Payer: OTHER MISCELLANEOUS

## 2022-09-21 PROCEDURE — 97110 THERAPEUTIC EXERCISES: CPT
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
## 2022-09-28 ENCOUNTER — HOSPITAL ENCOUNTER (OUTPATIENT)
Dept: PHYSICAL THERAPY | Age: 57
Setting detail: THERAPIES SERIES
Discharge: HOME | End: 2022-09-28
Payer: OTHER MISCELLANEOUS

## 2022-09-28 PROCEDURE — 97140 MANUAL THERAPY 1/> REGIONS: CPT

## 2022-09-28 PROCEDURE — 97110 THERAPEUTIC EXERCISES: CPT
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
## 2022-10-03 ENCOUNTER — TELEPHONE (OUTPATIENT)
Dept: PULMONOLOGY | Age: 57
End: 2022-10-03

## 2022-10-03 DIAGNOSIS — R22.1 NECK MASS: Primary | ICD-10-CM

## 2022-10-03 NOTE — TELEPHONE ENCOUNTER
Scheduling called and said they need orders put in for:    Ct Soft Tissue Neck with contrast     Creatine blood test

## 2022-10-10 ENCOUNTER — HOSPITAL ENCOUNTER (OUTPATIENT)
Age: 57
Discharge: HOME | End: 2022-10-10
Payer: COMMERCIAL

## 2022-10-10 ENCOUNTER — HOSPITAL ENCOUNTER (OUTPATIENT)
Dept: WOMENS IMAGING | Age: 57
Discharge: HOME | End: 2022-10-10
Payer: COMMERCIAL

## 2022-10-10 VITALS — WEIGHT: 145 LBS | HEIGHT: 58 IN | BODY MASS INDEX: 30.44 KG/M2

## 2022-10-10 DIAGNOSIS — Z12.31 BREAST CANCER SCREENING BY MAMMOGRAM: ICD-10-CM

## 2022-10-10 PROCEDURE — 86480 TB TEST CELL IMMUN MEASURE: CPT

## 2022-10-10 PROCEDURE — 77063 BREAST TOMOSYNTHESIS BI: CPT

## 2022-10-11 ENCOUNTER — HOSPITAL ENCOUNTER (OUTPATIENT)
Dept: CT IMAGING | Age: 57
Discharge: HOME | End: 2022-10-11
Payer: COMMERCIAL

## 2022-10-11 DIAGNOSIS — R22.1 NECK MASS: ICD-10-CM

## 2022-10-11 DIAGNOSIS — R59.0 MEDIASTINAL ADENOPATHY: ICD-10-CM

## 2022-10-11 PROCEDURE — 71260 CT THORAX DX C+: CPT

## 2022-10-11 PROCEDURE — 70491 CT SOFT TISSUE NECK W/DYE: CPT

## 2022-10-11 PROCEDURE — 6360000004 HC RX CONTRAST MEDICATION: Performed by: INTERNAL MEDICINE

## 2022-10-11 RX ADMIN — IOPAMIDOL 75 ML: 755 INJECTION, SOLUTION INTRAVENOUS at 08:33

## 2022-10-13 ENCOUNTER — OFFICE VISIT (OUTPATIENT)
Dept: PULMONOLOGY | Age: 57
End: 2022-10-13
Payer: COMMERCIAL

## 2022-10-13 VITALS
BODY MASS INDEX: 30.02 KG/M2 | SYSTOLIC BLOOD PRESSURE: 122 MMHG | WEIGHT: 143 LBS | DIASTOLIC BLOOD PRESSURE: 80 MMHG | HEIGHT: 58 IN | HEART RATE: 78 BPM | OXYGEN SATURATION: 95 %

## 2022-10-13 DIAGNOSIS — J84.10 GRANULOMATOUS LUNG DISEASE (HCC): ICD-10-CM

## 2022-10-13 DIAGNOSIS — R59.0 MEDIASTINAL ADENOPATHY: Primary | ICD-10-CM

## 2022-10-13 PROBLEM — E55.9 VITAMIN D DEFICIENCY: Status: ACTIVE | Noted: 2022-09-30

## 2022-10-13 PROBLEM — J30.2 SEASONAL ALLERGIES: Status: ACTIVE | Noted: 2022-09-30

## 2022-10-13 PROBLEM — R26.89 DECREASED FUNCTIONAL MOBILITY: Status: ACTIVE | Noted: 2022-09-30

## 2022-10-13 PROCEDURE — 3017F COLORECTAL CA SCREEN DOC REV: CPT | Performed by: INTERNAL MEDICINE

## 2022-10-13 PROCEDURE — G8419 CALC BMI OUT NRM PARAM NOF/U: HCPCS | Performed by: INTERNAL MEDICINE

## 2022-10-13 PROCEDURE — 99214 OFFICE O/P EST MOD 30 MIN: CPT | Performed by: INTERNAL MEDICINE

## 2022-10-13 PROCEDURE — G8427 DOCREV CUR MEDS BY ELIG CLIN: HCPCS | Performed by: INTERNAL MEDICINE

## 2022-10-13 PROCEDURE — G8484 FLU IMMUNIZE NO ADMIN: HCPCS | Performed by: INTERNAL MEDICINE

## 2022-10-13 PROCEDURE — 4004F PT TOBACCO SCREEN RCVD TLK: CPT | Performed by: INTERNAL MEDICINE

## 2022-10-13 ASSESSMENT — ENCOUNTER SYMPTOMS
SORE THROAT: 0
SINUS PRESSURE: 0
CONSTIPATION: 0
VOICE CHANGE: 0
ABDOMINAL PAIN: 0
STRIDOR: 0
RHINORRHEA: 0
WHEEZING: 0
CHOKING: 0
COUGH: 0
SHORTNESS OF BREATH: 0
BACK PAIN: 0
BLOOD IN STOOL: 0
APNEA: 0
CHEST TIGHTNESS: 0
DIARRHEA: 0
ANAL BLEEDING: 0
ABDOMINAL DISTENTION: 0

## 2022-10-13 NOTE — PROGRESS NOTES
Brian Mclaughlin    YOB: 1965     Date of Service:  10/13/2022     Chief Complaint   Patient presents with    1 Month Follow-Up    Results     Ct chest & ct neck 10/11/2022         HPI German  was used for the encounter. Patient has been accompanied by her son to our office today. Neck pain persists, but getting better. Denies any significant chest pain, shortness of breath or cough on this occasion. Wants to know the results of CT chest/neck performed on 10/11. Allergies   Allergen Reactions    Seasonal      Outpatient Medications Marked as Taking for the 10/13/22 encounter (Office Visit) with Cory Anglin MD   Medication Sig Dispense Refill    diclofenac sodium (VOLTAREN) 1 % GEL Apply 4 g topically 4 times daily as needed for Pain 100 g 3    DULoxetine (CYMBALTA) 30 MG extended release capsule Take 1 capsule by mouth 2 times daily 60 capsule 3    ondansetron (ZOFRAN-ODT) 4 MG disintegrating tablet Take 1 tablet by mouth every 8 hours as needed for Nausea or Vomiting 10 tablet 0    omeprazole (PRILOSEC) 20 MG delayed release capsule Take 1 capsule by mouth every morning (before breakfast) 30 capsule 0       Immunization History   Administered Date(s) Administered    COVID-19, MODERNA BLUE border, Primary or Immunocompromised, (age 12y+), IM, 100 mcg/0.5mL 04/09/2021, 05/07/2021    Influenza, FLUCELVAX, (age 10 mo+), MDCK, PF, 0.5mL 10/29/2021    Tdap (Boostrix, Adacel) 07/06/2020       Past Medical History:   Diagnosis Date    Anxiety     Depression     Memory disorder      Past Surgical History:   Procedure Laterality Date    SHOULDER SURGERY       Family History   Problem Relation Age of Onset    Breast Cancer Sister        Review of Systems:  Review of Systems   Constitutional:  Negative for activity change, appetite change, fatigue and fever.    HENT:  Negative for congestion, ear discharge, ear pain, postnasal drip, rhinorrhea, sinus pressure, sneezing, sore throat, tinnitus and voice change. Respiratory:  Negative for apnea, cough, choking, chest tightness, shortness of breath, wheezing and stridor. Cardiovascular:  Negative for chest pain, palpitations and leg swelling. Gastrointestinal:  Negative for abdominal distention, abdominal pain, anal bleeding, blood in stool, constipation and diarrhea. Musculoskeletal:  Positive for neck pain. Negative for arthralgias, back pain and gait problem. Skin:  Negative for pallor and rash. Allergic/Immunologic: Negative for environmental allergies. Neurological:  Negative for dizziness, tremors, seizures, syncope, speech difficulty, weakness, light-headedness, numbness and headaches. Hematological:  Negative for adenopathy. Does not bruise/bleed easily. Psychiatric/Behavioral:  Negative for sleep disturbance. Vitals:    10/13/22 1008   BP: 122/80   Pulse: 78   SpO2: 95%   Weight: 143 lb (64.9 kg)   Height: 4' 10\" (1.473 m)     No flowsheet data found. Body mass index is 29.89 kg/m². Wt Readings from Last 3 Encounters:   10/13/22 143 lb (64.9 kg)   10/10/22 145 lb (65.8 kg)   09/16/22 141 lb (64 kg)     BP Readings from Last 3 Encounters:   10/13/22 122/80   09/16/22 110/70   08/11/22 110/78         Physical Exam  Constitutional:       General: She is not in acute distress. Appearance: She is well-developed. She is not ill-appearing or diaphoretic. HENT:      Mouth/Throat:      Pharynx: No oropharyngeal exudate. Cardiovascular:      Rate and Rhythm: Normal rate and regular rhythm. Heart sounds: Normal heart sounds. No murmur heard. No friction rub. Pulmonary:      Effort: No respiratory distress. Breath sounds: Normal breath sounds. No wheezing, rhonchi or rales. Chest:      Chest wall: No tenderness. Abdominal:      General: There is no distension. Palpations: There is no mass. Tenderness: There is no abdominal tenderness. There is no guarding or rebound. Musculoskeletal:         General: No swelling or tenderness. Skin:     Coloration: Skin is not pale. Findings: No erythema or rash. Neurological:      Mental Status: She is alert and oriented to person, place, and time. Cranial Nerves: No cranial nerve deficit. Motor: No abnormal muscle tone. Coordination: Coordination normal.      Deep Tendon Reflexes: Reflexes normal.           Health Maintenance   Topic Date Due    Cervical cancer screen  Never done    Shingles vaccine (1 of 2) Never done    COVID-19 Vaccine (3 - Booster for Moderna series) 10/07/2021    Depression Screen  07/14/2023    Breast cancer screen  10/10/2023    Diabetes screen  08/05/2024    Lipids  08/05/2026    Colorectal Cancer Screen  08/16/2029    DTaP/Tdap/Td vaccine (2 - Td or Tdap) 07/06/2030    Flu vaccine  Completed    Hepatitis C screen  Completed    HIV screen  Completed    Hepatitis A vaccine  Aged Out    Hib vaccine  Aged Out    Meningococcal (ACWY) vaccine  Aged Out    Pneumococcal 0-64 years Vaccine  Aged Out          Assessment/Plan:  Clear reviewed CT chest/neck performed on 10/11 which shows mediastinal and right supraclavicular lymphadenopathy-small size, 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 Patient does state that she was previously treated with antibiotics for 6 months for latent tuberculosis. QuantiFERON gold test has been performed, results are awaited. Based on findings on CT scan, abnormalities are likely related to TB exposure rather than malignancy or lymphoma. No significant lung parenchymal abnormalities. Patient currently has no symptoms suggestive of active tuberculosis. Advised about symptoms which she should watch out for and contact us if it becomes necessary in future. Doubt value of repeat imaging, will only monitor patient for symptoms. Return in about 3 months (around 1/13/2023).

## 2022-10-15 LAB
QUANTI TB GOLD PLUS: NEGATIVE
QUANTI TB1 MINUS NIL: 0.01 IU/ML (ref 0–0.34)
QUANTI TB2 MINUS NIL: 0.04 IU/ML (ref 0–0.34)
QUANTIFERON MITOGEN: >10 IU/ML
QUANTIFERON NIL: 0.03 IU/ML

## 2022-11-11 ENCOUNTER — HOSPITAL ENCOUNTER (EMERGENCY)
Age: 57
Discharge: HOME | End: 2022-11-11
Attending: EMERGENCY MEDICINE
Payer: COMMERCIAL

## 2022-11-11 VITALS
HEIGHT: 60 IN | SYSTOLIC BLOOD PRESSURE: 122 MMHG | WEIGHT: 140 LBS | TEMPERATURE: 98.6 F | RESPIRATION RATE: 21 BRPM | DIASTOLIC BLOOD PRESSURE: 71 MMHG | OXYGEN SATURATION: 97 % | HEART RATE: 83 BPM | BODY MASS INDEX: 27.48 KG/M2

## 2022-11-11 DIAGNOSIS — L30.9 DERMATITIS DUE TO UNKNOWN CAUSE: Primary | ICD-10-CM

## 2022-11-11 PROCEDURE — 96372 THER/PROPH/DIAG INJ SC/IM: CPT

## 2022-11-11 PROCEDURE — 6360000002 HC RX W HCPCS: Performed by: EMERGENCY MEDICINE

## 2022-11-11 PROCEDURE — 99284 EMERGENCY DEPT VISIT MOD MDM: CPT

## 2022-11-11 PROCEDURE — 6370000000 HC RX 637 (ALT 250 FOR IP): Performed by: EMERGENCY MEDICINE

## 2022-11-11 RX ORDER — HYDROXYZINE PAMOATE 25 MG/1
50 CAPSULE ORAL ONCE
Status: COMPLETED | OUTPATIENT
Start: 2022-11-11 | End: 2022-11-11

## 2022-11-11 RX ORDER — HYDROXYZINE HYDROCHLORIDE 25 MG/1
25 TABLET, FILM COATED ORAL 4 TIMES DAILY PRN
Qty: 120 TABLET | Refills: 0 | Status: SHIPPED | OUTPATIENT
Start: 2022-11-11 | End: 2022-11-21

## 2022-11-11 RX ORDER — DEXAMETHASONE SODIUM PHOSPHATE 10 MG/ML
6 INJECTION, SOLUTION INTRAMUSCULAR; INTRAVENOUS ONCE
Status: COMPLETED | OUTPATIENT
Start: 2022-11-11 | End: 2022-11-11

## 2022-11-11 RX ORDER — METHYLPREDNISOLONE 4 MG/1
TABLET ORAL
Qty: 1 KIT | Refills: 0 | Status: SHIPPED | OUTPATIENT
Start: 2022-11-11 | End: 2022-11-17

## 2022-11-11 RX ADMIN — HYDROXYZINE PAMOATE 50 MG: 25 CAPSULE ORAL at 02:18

## 2022-11-11 RX ADMIN — DEXAMETHASONE SODIUM PHOSPHATE 6 MG: 10 INJECTION, SOLUTION INTRAMUSCULAR; INTRAVENOUS at 02:18

## 2022-11-11 NOTE — ED PROVIDER NOTES
[address] [facility]  eMERGENCY dEPARTMENT eNCOUnter        Pt Name: Astrid Olguin  MRN: [id number]  [place] 1965  Date of evaluation: 11/11/2022  Provider: Cornel Temple MD  PCP: Acosta Puente MD      CHIEF COMPLAINT       Chief Complaint   Patient presents with    Allergic Reaction     Pt via son from home, red itchy rash over body, son had same problem recently, son diagnosed with hay fever       HISTORY OFPRESENT ILLNESS   (Location/Symptom, Timing/Onset, Context/Setting, Quality, Duration, Modifying Factors,Severity)  Note limiting factors. Astrid Olguin is a 65 y.o. female she has had a rash with itching for about a week no new medications shampoos or detergents denies any trouble breathing or swallowing is having itching of the face arms and legs and thorax    Nursing Notes were all reviewed and agreed with or any disagreements were addressed  in the HPI. REVIEW OF SYSTEMS    (2-9 systems for level 4, 10 or more for level 5)       REVIEW OF SYSTEMS    Constitutional:  Denies fever, chills, or weakness   Eyes:  Denies vision changes  HENT:  Denies sore throat or neck pain   Respiratory:  Denies cough or shortness of breath   Cardiovascular:  Denies chest pain  GI:  Denies abdominal pain, nausea, vomiting, or diarrhea   Musculoskeletal:  Denies back pain   Skin: rash or vesicles   Neurologic:  no headache weakness focal    Lymphatic:  no swollen  nodes   Psychiatric: no si or hs thoughts     All systems negative except as marked. Positives and Pertinent negatives as per HPI. Except as noted above in the ROS, all other systems were reviewed andnegative.        PASTMEDICAL HISTORY     Past Medical History:   Diagnosis Date    Anxiety     Depression     Memory disorder          SURGICAL HISTORY       Past Surgical History:   Procedure Laterality Date    SHOULDER SURGERY           CURRENT MEDICATIONS       Previous Medications    DICLOFENAC SODIUM (VOLTAREN) 1 % GEL    Apply 4 g topically 4 times daily as needed for Pain    DULOXETINE (CYMBALTA) 30 MG EXTENDED RELEASE CAPSULE    Take 1 capsule by mouth 2 times daily    NAPROXEN (NAPROSYN) 500 MG TABLET    Take 1 tablet by mouth 2 times daily (with meals) for 10 days    OMEPRAZOLE (PRILOSEC) 20 MG DELAYED RELEASE CAPSULE    Take 1 capsule by mouth every morning (before breakfast)    ONDANSETRON (ZOFRAN-ODT) 4 MG DISINTEGRATING TABLET    Take 1 tablet by mouth every 8 hours as needed for Nausea or Vomiting       ALLERGIES     Seasonal    FAMILY HISTORY       Family History   Problem Relation Age of Onset    Breast Cancer Sister           SOCIAL HISTORY       Social History     Socioeconomic History    Marital status:    Tobacco Use    Smoking status: Never    Smokeless tobacco: Current     Types: Chew   Vaping Use    Vaping Use: Never used   Substance and Sexual Activity    Alcohol use: No    Drug use: Never       SCREENINGS             PHYSICAL EXAM    (up to 7 for level 4, 8 or more for level 5)     ED Triage Vitals [11/11/22 0135]   BP Temp Temp Source Heart Rate Resp SpO2 Height Weight   122/71 98.6 °F (37 °C) Oral 83 21 97 % 5' (1.524 m) 140 lb (63.5 kg)           General Appearance:  Alert, cooperative, no distress, appears stated age. Head:  Normocephalic, without obvious abnormality, atraumatic. Eyes:  conjunctiva/corneas clear, EOM's intact. Sclera anicteric. ENT: Mucous membranes moist.   Neck: Supple, symmetrical, trachea midline, no adenopathy. No jugular venous distention. Lungs:   No Respiratory Distress. no rales  rhonchi rub   Chest Wall:  Nontender  no deformity   Heart:  Rsr no murmer gallop    Abdomen:   Soft nontender no organomegally    Extremities:  Full range of motion. no deformity   Pulses: Equal  upper and lower    Skin: Very fine papular rash following the forearms thorax is unremarkable as are the legs mucous membranes unremarkable   Neurologic: Alert and oriented X 3.   Motor grossly normal.  Speech clear. Cr n 2-12 intact       DIAGNOSTIC RESULTS   LABS:    Labs Reviewed - No data to display    All other labs were within normal range or not returned as of thisdictation. EKG: All EKG's are interpreted by the Emergency Department Physician who either signs or Co-signs this chart in the absence of a cardiologist.        RADIOLOGY:   Non-plain film images such as CT, Ultrasound and MRI are read by the radiologist. Khoi Searing images are visualized and preliminarily interpreted by the  ED Provider with the belowfindings:        Interpretation per the Radiologist below, if available at the time of this note:    No orders to display         PROCEDURES   Unless otherwise noted below, none     Procedures    CRITICAL CARE TIME   N/A      CONSULTS:  None    EMERGENCY DEPARTMENT COURSE and DIFFERENTIAL DIAGNOSIS/MDM:   Vitals:    Vitals:    11/11/22 0135   BP: 122/71   Pulse: 83   Resp: 21   Temp: 98.6 °F (37 °C)   TempSrc: Oral   SpO2: 97%   Weight: 140 lb (63.5 kg)   Height: 5' (1.524 m)       Patient was given the following medications:  Medications   dexamethasone (PF) (DECADRON) injection 6 mg (has no administration in time range)   hydrOXYzine pamoate (VISTARIL) capsule 50 mg (has no administration in time range)           Is this patient to be included in the SEP-1 Core Measure due to severe sepsis or septic shock? No   Exclusion criteria - the patient is NOT to be included for SEP-1 Core Measure due to: Infection is not suspected  Patient was placed on Medrol Dosepak and Vistaril will follow up with her doctor    The patient tolerated their visit well. Thepatient and / or the family were informed of the results of any tests, a time was given to answer questions. FINAL IMPRESSION      1.  Dermatitis due to unknown cause        DISPOSITION/PLAN   DISPOSITION Decision To Discharge 11/11/2022 02:11:57 AM      PATIENT REFERRED TO:  MD Casey Yusuf  [address]  [facility] [address]  [phone]            DISCHARGE MEDICATIONS:  New Prescriptions    HYDROXYZINE HCL (ATARAX) 25 MG TABLET    Take 1 tablet by mouth 4 times daily as needed for Itching    METHYLPREDNISOLONE (MEDROL, TREMAINE,) 4 MG TABLET    Take by mouth.        DISCONTINUED MEDICATIONS:  Discontinued Medications    No medications on file              (Please note that portions of this note were completed with a voice recognition program.  Efforts were made to edit the dictations but occasionally words aremis-transcribed.)    Mikhail Briones MD (electronically signed)          Mikhail Briones MD  11/11/22 3268

## 2023-02-06 ENCOUNTER — OFFICE VISIT (OUTPATIENT)
Dept: PULMONOLOGY | Age: 58
End: 2023-02-06
Payer: COMMERCIAL

## 2023-02-06 VITALS
OXYGEN SATURATION: 98 % | HEIGHT: 58 IN | HEART RATE: 86 BPM | DIASTOLIC BLOOD PRESSURE: 80 MMHG | WEIGHT: 147 LBS | BODY MASS INDEX: 30.86 KG/M2 | SYSTOLIC BLOOD PRESSURE: 110 MMHG

## 2023-02-06 DIAGNOSIS — R59.0 MEDIASTINAL ADENOPATHY: Primary | ICD-10-CM

## 2023-02-06 PROCEDURE — G8427 DOCREV CUR MEDS BY ELIG CLIN: HCPCS | Performed by: INTERNAL MEDICINE

## 2023-02-06 PROCEDURE — G8417 CALC BMI ABV UP PARAM F/U: HCPCS | Performed by: INTERNAL MEDICINE

## 2023-02-06 PROCEDURE — 4004F PT TOBACCO SCREEN RCVD TLK: CPT | Performed by: INTERNAL MEDICINE

## 2023-02-06 PROCEDURE — G8484 FLU IMMUNIZE NO ADMIN: HCPCS | Performed by: INTERNAL MEDICINE

## 2023-02-06 PROCEDURE — 3017F COLORECTAL CA SCREEN DOC REV: CPT | Performed by: INTERNAL MEDICINE

## 2023-02-06 PROCEDURE — 99213 OFFICE O/P EST LOW 20 MIN: CPT | Performed by: INTERNAL MEDICINE

## 2023-02-06 ASSESSMENT — ENCOUNTER SYMPTOMS
ABDOMINAL PAIN: 0
RHINORRHEA: 0
CHEST TIGHTNESS: 0
CONSTIPATION: 0
BACK PAIN: 1
ANAL BLEEDING: 0
STRIDOR: 0
VOICE CHANGE: 0
BLOOD IN STOOL: 0
COUGH: 0
WHEEZING: 0
SHORTNESS OF BREATH: 0
CHOKING: 0
DIARRHEA: 0
APNEA: 0
SINUS PRESSURE: 0
SORE THROAT: 0
ABDOMINAL DISTENTION: 0

## 2023-02-06 NOTE — PROGRESS NOTES
Jenn Patrick    YOB: 1965     Date of Service:  2/6/2023     Chief Complaint   Patient presents with    3 Month Follow-Up                HPI Welsh  was utilized via video. Has been accompanied by her son to our office today. Overall patient has been doing well, denies any significant shortness of breath or cough. Good appetite and no recent loss of weight. States that she now has back pain and her neck pain is actually resolved. Complains of minimal upper extremity joint stiffness and swelling. Allergies   Allergen Reactions    Seasonal      Outpatient Medications Marked as Taking for the 2/6/23 encounter (Office Visit) with Sukumar Torres MD   Medication Sig Dispense Refill    diclofenac sodium (VOLTAREN) 1 % GEL Apply 4 g topically 4 times daily as needed for Pain 100 g 3    DULoxetine (CYMBALTA) 30 MG extended release capsule Take 1 capsule by mouth 2 times daily 60 capsule 3    ondansetron (ZOFRAN-ODT) 4 MG disintegrating tablet Take 1 tablet by mouth every 8 hours as needed for Nausea or Vomiting 10 tablet 0    omeprazole (PRILOSEC) 20 MG delayed release capsule Take 1 capsule by mouth every morning (before breakfast) 30 capsule 0       Immunization History   Administered Date(s) Administered    COVID-19, MODERNA BLUE border, Primary or Immunocompromised, (age 12y+), IM, 100 mcg/0.5mL 04/09/2021, 05/07/2021    Influenza, FLUCELVAX, (age 10 mo+), MDCK, PF, 0.5mL 10/29/2021    Tdap (Boostrix, Adacel) 07/06/2020       Past Medical History:   Diagnosis Date    Anxiety     Depression     Memory disorder      Past Surgical History:   Procedure Laterality Date    SHOULDER SURGERY       Family History   Problem Relation Age of Onset    Breast Cancer Sister        Review of Systems:  Review of Systems   Constitutional:  Negative for activity change, appetite change, fatigue and fever.    HENT:  Negative for congestion, ear discharge, ear pain, postnasal drip, rhinorrhea, sinus pressure, sneezing, sore throat, tinnitus and voice change. Respiratory:  Negative for apnea, cough, choking, chest tightness, shortness of breath, wheezing and stridor. Cardiovascular:  Negative for chest pain, palpitations and leg swelling. Gastrointestinal:  Negative for abdominal distention, abdominal pain, anal bleeding, blood in stool, constipation and diarrhea. Musculoskeletal:  Positive for arthralgias, back pain and joint swelling. Negative for gait problem. Skin:  Negative for pallor and rash. Allergic/Immunologic: Negative for environmental allergies. Neurological:  Negative for dizziness, tremors, seizures, syncope, speech difficulty, weakness, light-headedness, numbness and headaches. Hematological:  Negative for adenopathy. Does not bruise/bleed easily. Psychiatric/Behavioral:  Negative for sleep disturbance. Vitals:    02/06/23 1035   BP: 110/80   Pulse: 86   SpO2: 98%   Weight: 147 lb (66.7 kg)   Height: 4' 10\" (1.473 m)     No flowsheet data found. Body mass index is 30.72 kg/m². Wt Readings from Last 3 Encounters:   02/06/23 147 lb (66.7 kg)   11/11/22 140 lb (63.5 kg)   10/13/22 143 lb (64.9 kg)     BP Readings from Last 3 Encounters:   02/06/23 110/80   11/11/22 122/71   10/13/22 122/80         Physical Exam  Constitutional:       General: She is not in acute distress. Appearance: She is well-developed. She is not ill-appearing or diaphoretic. HENT:      Mouth/Throat:      Pharynx: No oropharyngeal exudate. Cardiovascular:      Rate and Rhythm: Normal rate and regular rhythm. Heart sounds: Normal heart sounds. No murmur heard. No friction rub. Pulmonary:      Effort: No respiratory distress. Breath sounds: Normal breath sounds. No wheezing, rhonchi or rales. Chest:      Chest wall: No tenderness. Abdominal:      General: There is no distension. Palpations: There is no mass. Tenderness: There is no abdominal tenderness. There is no guarding or rebound. Musculoskeletal:         General: No swelling or tenderness. Skin:     Coloration: Skin is not pale. Findings: No erythema or rash. Neurological:      Mental Status: She is alert and oriented to person, place, and time. Cranial Nerves: No cranial nerve deficit. Motor: No abnormal muscle tone. Coordination: Coordination normal.      Deep Tendon Reflexes: Reflexes normal.           Health Maintenance   Topic Date Due    Cervical cancer screen  Never done    Shingles vaccine (1 of 2) Never done    COVID-19 Vaccine (3 - Booster for Moderna series) 07/02/2021    Depression Screen  07/14/2023    Breast cancer screen  10/10/2023    Diabetes screen  08/05/2024    Lipids  11/22/2027    Colorectal Cancer Screen  08/16/2029    DTaP/Tdap/Td vaccine (2 - Td or Tdap) 07/06/2030    Flu vaccine  Completed    Hepatitis C screen  Completed    HIV screen  Completed    Hepatitis A vaccine  Aged Out    Hib vaccine  Aged Out    Meningococcal (ACWY) vaccine  Aged Out    Pneumococcal 0-64 years Vaccine  Aged Out          Assessment/Plan:    Patient does not have any new symptoms. Prior CT chest/neck performed in October revealed small to moderate-sized mediastinal and supraclavicular lymphadenopathy, more compatible with granulomatous lung disease. Patient also has prior history of latent tuberculosis, treated with antibiotics x6 months. However, patient's QuantiFERON gold test that was performed in October 2022 was negative. Overall patient is asymptomatic with no significant concerns noted on CT imaging. Significant adenopathy was subcarinal lymph node enlargement, which likely represents prior granulomatous lung disease. Patient should contact us if she does come across any new symptoms. Otherwise we will only follow patient as needed.

## 2023-02-11 ENCOUNTER — HOSPITAL ENCOUNTER (EMERGENCY)
Age: 58
Discharge: HOME | End: 2023-02-11
Payer: COMMERCIAL

## 2023-02-11 ENCOUNTER — APPOINTMENT (OUTPATIENT)
Dept: GENERAL RADIOLOGY | Age: 58
End: 2023-02-11
Payer: COMMERCIAL

## 2023-02-11 VITALS
OXYGEN SATURATION: 98 % | DIASTOLIC BLOOD PRESSURE: 73 MMHG | RESPIRATION RATE: 16 BRPM | SYSTOLIC BLOOD PRESSURE: 127 MMHG | HEART RATE: 82 BPM | TEMPERATURE: 98.5 F

## 2023-02-11 DIAGNOSIS — R52 BODY ACHES: ICD-10-CM

## 2023-02-11 DIAGNOSIS — J06.9 ACUTE UPPER RESPIRATORY INFECTION: Primary | ICD-10-CM

## 2023-02-11 DIAGNOSIS — U07.1 COVID-19: ICD-10-CM

## 2023-02-11 DIAGNOSIS — R51.9 NONINTRACTABLE HEADACHE, UNSPECIFIED CHRONICITY PATTERN, UNSPECIFIED HEADACHE TYPE: ICD-10-CM

## 2023-02-11 LAB
RAPID INFLUENZA  B AGN: NEGATIVE
RAPID INFLUENZA A AGN: NEGATIVE
SARS-COV-2, NAAT: DETECTED

## 2023-02-11 PROCEDURE — 6370000000 HC RX 637 (ALT 250 FOR IP): Performed by: PHYSICIAN ASSISTANT

## 2023-02-11 PROCEDURE — 99284 EMERGENCY DEPT VISIT MOD MDM: CPT

## 2023-02-11 PROCEDURE — 87635 SARS-COV-2 COVID-19 AMP PRB: CPT

## 2023-02-11 PROCEDURE — 71045 X-RAY EXAM CHEST 1 VIEW: CPT

## 2023-02-11 PROCEDURE — 87804 INFLUENZA ASSAY W/OPTIC: CPT

## 2023-02-11 RX ORDER — ACETAMINOPHEN 500 MG
1000 TABLET ORAL ONCE
Status: COMPLETED | OUTPATIENT
Start: 2023-02-11 | End: 2023-02-11

## 2023-02-11 RX ORDER — IBUPROFEN 400 MG/1
400 TABLET ORAL ONCE
Status: COMPLETED | OUTPATIENT
Start: 2023-02-11 | End: 2023-02-11

## 2023-02-11 RX ADMIN — ACETAMINOPHEN 1000 MG: 500 TABLET ORAL at 20:35

## 2023-02-11 RX ADMIN — IBUPROFEN 400 MG: 400 TABLET, FILM COATED ORAL at 20:35

## 2023-02-11 ASSESSMENT — PAIN - FUNCTIONAL ASSESSMENT: PAIN_FUNCTIONAL_ASSESSMENT: 0-10

## 2023-02-11 ASSESSMENT — PAIN SCALES - GENERAL: PAINLEVEL_OUTOF10: 10

## 2023-02-12 NOTE — ED PROVIDER NOTES
[address] [facility]        Pt Name: Emy Mendez  MRN: [id number]  [place] 1965  Date of evaluation: 2/11/2023  Provider: Rosibel Yee PA-C  PCP: Arslan Rocha MD  Note Started: 9:17 PM EST 2/11/23      CARRIE. I have evaluated this patient. My supervising physician was available for consultation. CHIEF COMPLAINT       Chief Complaint   Patient presents with    Illness     Pt states that cough, headache, fever, nausea, sore throat can barely swallow spit, eyes are watery since Wednesday. Took tylenol this morning. HISTORY OF PRESENT ILLNESS: 1 or more Elements     History From: patient  Limitations to history : Language [facility]  used    Emy Mendez is a 65 y.o. female who presents to the emergency department with a chief complaint of fevers, cough, rhinorrhea, body aches, sore throat that has been ongoing for the past 4 days.  was just diagnosed with COVID-19 this week also. Patient denies vomiting, urinary or bowel symptoms or any other symptoms. Had Tylenol earlier this morning but denies any other medication she has been using. Nursing Notes were all reviewed and agreed with or any disagreements were addressed in the HPI. REVIEW OF SYSTEMS :      Review of Systems    Positives and Pertinent negatives as per HPI.      SURGICAL HISTORY     Past Surgical History:   Procedure Laterality Date    SHOULDER SURGERY         CURRENTMEDICATIONS       Discharge Medication List as of 2/11/2023  9:35 PM        CONTINUE these medications which have NOT CHANGED    Details   diclofenac sodium (VOLTAREN) 1 % GEL Apply 4 g topically 4 times daily as needed for Pain, Topical, 4 TIMES DAILY PRN Starting Thu 7/14/2022, Disp-100 g, R-3, Normal      DULoxetine (CYMBALTA) 30 MG extended release capsule Take 1 capsule by mouth 2 times daily, Disp-60 capsule, R-3Normal      ondansetron (ZOFRAN-ODT) 4 MG disintegrating tablet Take 1 tablet by mouth every 8 hours as needed for Nausea or Vomiting, Disp-10 tablet, R-0Print      naproxen (NAPROSYN) 500 MG tablet Take 1 tablet by mouth 2 times daily (with meals) for 10 days, Disp-20 tablet, R-0Print      omeprazole (PRILOSEC) 20 MG delayed release capsule Take 1 capsule by mouth every morning (before breakfast), Disp-30 capsule, R-0Normal             ALLERGIES     Seasonal    FAMILYHISTORY       Family History   Problem Relation Age of Onset    Breast Cancer Sister         SOCIAL HISTORY       Social History     Tobacco Use    Smoking status: Never    Smokeless tobacco: Current     Types: Chew   Vaping Use    Vaping Use: Never used   Substance Use Topics    Alcohol use: No    Drug use: Never       SCREENINGS        [place] Coma Scale  Eye Opening: Spontaneous  Best Verbal Response: Oriented  Best Motor Response: Obeys commands  Rosa Coma Scale Score: 15                CIWA Assessment  BP: 127/73  Heart Rate: 82           PHYSICAL EXAM  1 or more Elements     ED Triage Vitals [02/11/23 2009]   BP Temp Temp Source Heart Rate Resp SpO2 Height Weight   127/73 98.5 °F (36.9 °C) Oral 82 16 98 % -- --       Physical Exam  Vitals and nursing note reviewed. Constitutional:       Appearance: She is well-developed. She is not diaphoretic. HENT:      Head: Atraumatic. Nose: Nose normal.      Mouth/Throat:      Pharynx: Posterior oropharyngeal erythema present. No oropharyngeal exudate. Eyes:      General:         Right eye: No discharge. Left eye: No discharge. Cardiovascular:      Rate and Rhythm: Normal rate and regular rhythm. Heart sounds: No murmur heard. No friction rub. No gallop. Pulmonary:      Effort: Pulmonary effort is normal. No respiratory distress. Breath sounds: No stridor. No wheezing, rhonchi or rales. Abdominal:      General: Bowel sounds are normal. There is no distension. Palpations: Abdomen is soft. There is no mass. Tenderness: There is no abdominal tenderness. There is no guarding or rebound. Hernia: No hernia is present. Musculoskeletal:         General: No swelling. Normal range of motion. Cervical back: Normal range of motion. Skin:     General: Skin is warm and dry. Findings: No erythema or rash. Neurological:      Mental Status: She is alert and oriented to person, place, and time. Cranial Nerves: No cranial nerve deficit. Psychiatric:         Behavior: Behavior normal.           DIAGNOSTIC RESULTS   LABS:    Labs Reviewed   COVID-19, RAPID - Abnormal; Notable for the following components:       Result Value    SARS-CoV-2, NAAT DETECTED (*)     All other components within normal limits   RAPID INFLUENZA A/B ANTIGENS       When ordered only abnormal lab results are displayed. All other labs were within normal range or not returned as of this dictation. EKG: When ordered, EKG's are interpreted by the Emergency Department Physician in the absence of a cardiologist.  Please see their note for interpretation of EKG. RADIOLOGY:   Non-plain film images such as CT, Ultrasound and MRI are read by the radiologist. Plain radiographic images are visualized and preliminarily interpreted by the ED Provider with the below findings:        Interpretation per the Radiologist below, if available at the time of this note:    XR CHEST PORTABLE   Final Result      Lungs are clear           XR CHEST PORTABLE    Result Date: 2/11/2023  EXAMINATION: ONE XRAY VIEW OF THE CHEST 2/11/2023 8:50 pm COMPARISON: CT 10/11/2022 HISTORY: ORDERING SYSTEM PROVIDED HISTORY: cough TECHNOLOGIST PROVIDED HISTORY: Reason for exam:->cough Reason for Exam: cough FINDINGS: Heart size is normal  Aorta is normal.  Lungs are normally expanded and clear. No pleural effusions. Osseous structures are unremarkable. Lungs are clear       No results found.     PROCEDURES   Unless otherwise noted below, none     Procedures    CRITICAL CARE TIME (.cctime)       PAST MEDICAL HISTORY      has a past medical history of Anxiety, Depression, and Memory disorder. EMERGENCY DEPARTMENT COURSE and DIFFERENTIAL DIAGNOSIS/MDM:   Vitals:    Vitals:    02/11/23 2009   BP: 127/73   Pulse: 82   Resp: 16   Temp: 98.5 °F (36.9 °C)   TempSrc: Oral   SpO2: 98%       Patient was given the following medications:  Medications   acetaminophen (TYLENOL) tablet 1,000 mg (1,000 mg Oral Given 2/11/23 2035)   ibuprofen (ADVIL;MOTRIN) tablet 400 mg (400 mg Oral Given 2/11/23 2035)             Is this patient to be included in the SEP-1 Core Measure due to severe sepsis or septic shock? No   Exclusion criteria - the patient is NOT to be included for SEP-1 Core Measure due to:  Viral etiology found or highly suspected (including COVID-19) without concomitant bacterial infection    Chronic Conditions affecting care:    has a past medical history of Anxiety, Depression, and Memory disorder. CONSULTS: (Who and What was discussed)  None      Social Determinants Significantly Affecting Health : None    Records Reviewed (External and Source)     CC/HPI Summary, DDx, ED Course, and Reassessment: Recheck of the patient prior to discharge she was feeling better after oral Tylenol and Motrin. I went over her results using language line [id number]. She has positive for COVID and is having multiple viral-like symptoms with recent COVID exposure to her . Low suspicion for bacterial pneumonia, meningitis, encephalitis, sepsis or other emergent etiology. She was educated on symptomatic treatment at home. We will follow-up as an outpatient return here for any worsening of symptoms or problems at home. Disposition Considerations (tests considered but not done, Admit vs D/C, Shared Decision Making, Pt Expectation of Test or Tx.):        I am the Primary Clinician of Record. FINAL IMPRESSION      1. Acute upper respiratory infection    2.  Nonintractable headache, unspecified chronicity pattern, unspecified headache type    3. Body aches    4.  COVID-19          DISPOSITION/PLAN     DISPOSITION Decision To Discharge 02/11/2023 09:31:19 PM      PATIENT REFERRED TO:  Rupesh Monreal  [phone]    Schedule an appointment as soon as possible for a visit in 3 days  For re-check, As needed    [facility] Emergency Department  [address] [place] [address]  [phone]    As needed    DISCHARGE MEDICATIONS:  Discharge Medication List as of 2/11/2023  9:35 PM          DISCONTINUED MEDICATIONS:  Discharge Medication List as of 2/11/2023  9:35 PM                 (Please note that portions of this note were completed with a voice recognition program.  Efforts were made to edit the dictations but occasionally words are mis-transcribed.)    Rickey Palmer PA-C (electronically signed)        Rickey Palmer PA-C  02/11/23 [id number]

## 2023-02-28 DIAGNOSIS — S83.412A SPRAIN OF MEDIAL COLLATERAL LIGAMENT OF LEFT KNEE, INITIAL ENCOUNTER: ICD-10-CM

## 2023-02-28 RX ORDER — MELOXICAM 7.5 MG/1
TABLET ORAL
Qty: 30 TABLET | Refills: 0 | OUTPATIENT
Start: 2023-02-28

## 2023-04-22 ENCOUNTER — HOSPITAL ENCOUNTER (EMERGENCY)
Age: 58
Discharge: HOME | End: 2023-04-22
Payer: COMMERCIAL

## 2023-04-22 VITALS
TEMPERATURE: 98 F | DIASTOLIC BLOOD PRESSURE: 75 MMHG | BODY MASS INDEX: 30.31 KG/M2 | WEIGHT: 145 LBS | RESPIRATION RATE: 18 BRPM | HEART RATE: 77 BPM | SYSTOLIC BLOOD PRESSURE: 116 MMHG | OXYGEN SATURATION: 100 %

## 2023-04-22 DIAGNOSIS — J30.2 SEASONAL ALLERGIC RHINITIS, UNSPECIFIED TRIGGER: Primary | ICD-10-CM

## 2023-04-22 PROCEDURE — 6370000000 HC RX 637 (ALT 250 FOR IP): Performed by: PHYSICIAN ASSISTANT

## 2023-04-22 PROCEDURE — 99283 EMERGENCY DEPT VISIT LOW MDM: CPT

## 2023-04-22 RX ORDER — DIPHENHYDRAMINE HCL 25 MG
25 TABLET ORAL ONCE
Status: COMPLETED | OUTPATIENT
Start: 2023-04-22 | End: 2023-04-22

## 2023-04-22 RX ORDER — ACETAMINOPHEN 500 MG
1000 TABLET ORAL ONCE
Status: COMPLETED | OUTPATIENT
Start: 2023-04-22 | End: 2023-04-22

## 2023-04-22 RX ORDER — FLUTICASONE PROPIONATE 50 MCG
1 SPRAY, SUSPENSION (ML) NASAL DAILY
Qty: 32 G | Refills: 1 | Status: SHIPPED | OUTPATIENT
Start: 2023-04-22

## 2023-04-22 RX ORDER — KETOTIFEN FUMARATE 0.35 MG/ML
1 SOLUTION/ DROPS OPHTHALMIC 2 TIMES DAILY
Qty: 1 ML | Refills: 0 | Status: SHIPPED | OUTPATIENT
Start: 2023-04-22 | End: 2023-05-02

## 2023-04-22 RX ORDER — CETIRIZINE HYDROCHLORIDE 10 MG/1
10 TABLET ORAL DAILY
Qty: 30 TABLET | Refills: 0 | Status: SHIPPED | OUTPATIENT
Start: 2023-04-22 | End: 2023-05-22

## 2023-04-22 RX ADMIN — DIPHENHYDRAMINE HYDROCHLORIDE 25 MG: 25 TABLET ORAL at 02:01

## 2023-04-22 RX ADMIN — ACETAMINOPHEN 1000 MG: 500 TABLET ORAL at 02:01

## 2023-04-22 ASSESSMENT — ENCOUNTER SYMPTOMS
NAUSEA: 0
TROUBLE SWALLOWING: 0
EYE REDNESS: 0
RHINORRHEA: 0
COUGH: 1
SHORTNESS OF BREATH: 0
VOICE CHANGE: 0
EYE ITCHING: 1
VOMITING: 0
DIARRHEA: 0
ABDOMINAL PAIN: 0
PHOTOPHOBIA: 0
CHOKING: 0

## 2023-04-22 ASSESSMENT — LIFESTYLE VARIABLES
HOW MANY STANDARD DRINKS CONTAINING ALCOHOL DO YOU HAVE ON A TYPICAL DAY: PATIENT DOES NOT DRINK
HOW OFTEN DO YOU HAVE A DRINK CONTAINING ALCOHOL: NEVER

## 2023-04-22 ASSESSMENT — PAIN - FUNCTIONAL ASSESSMENT: PAIN_FUNCTIONAL_ASSESSMENT: NONE - DENIES PAIN

## 2023-04-22 NOTE — ED PROVIDER NOTES
FLUTICASONE (FLONASE) 50 MCG/ACT NASAL SPRAY    1 spray by Each Nostril route daily    KETOTIFEN (ZADITOR) 0.025 % OPHTHALMIC SOLUTION    Place 1 drop into both eyes 2 times daily for 10 days       DISCONTINUED MEDICATIONS:  Discontinued Medications    No medications on file              (Please note that portions of this note were completed with a voice recognition program.  Efforts were made to edit the dictations but occasionally words are mis-transcribed.)    Carmen Montgomery PA-C (electronically signed)        Carmen Montgomery PA-C  04/22/23 [id number]

## 2023-06-08 RX ORDER — MECLIZINE HYDROCHLORIDE 25 MG/1
TABLET ORAL
Qty: 30 TABLET | Refills: 0 | OUTPATIENT
Start: 2023-06-08

## 2024-01-30 RX ORDER — MECLIZINE HYDROCHLORIDE 25 MG/1
25 TABLET ORAL 3 TIMES DAILY PRN
Qty: 30 TABLET | Refills: 0 | OUTPATIENT
Start: 2024-01-30

## 2024-02-17 ENCOUNTER — HOSPITAL ENCOUNTER (EMERGENCY)
Age: 59
Discharge: HOME | End: 2024-02-17
Payer: COMMERCIAL

## 2024-02-17 VITALS
OXYGEN SATURATION: 98 % | TEMPERATURE: 98.3 F | RESPIRATION RATE: 16 BRPM | HEART RATE: 81 BPM | SYSTOLIC BLOOD PRESSURE: 138 MMHG | DIASTOLIC BLOOD PRESSURE: 80 MMHG

## 2024-02-17 DIAGNOSIS — S05.01XA ABRASION OF RIGHT CORNEA, INITIAL ENCOUNTER: Primary | ICD-10-CM

## 2024-02-17 DIAGNOSIS — H10.9 CONJUNCTIVITIS OF BOTH EYES, UNSPECIFIED CONJUNCTIVITIS TYPE: ICD-10-CM

## 2024-02-17 PROCEDURE — 99283 EMERGENCY DEPT VISIT LOW MDM: CPT

## 2024-02-17 RX ORDER — BALANCED SALT SOLUTION ENRICHED WITH BICARBONATE, DEXTROSE, AND GLUTATHIONE
KIT INTRAOCULAR
Status: DISCONTINUED
Start: 2024-02-17 | End: 2024-02-17 | Stop reason: HOSPADM

## 2024-02-17 RX ORDER — PROPARACAINE HYDROCHLORIDE 5 MG/ML
SOLUTION/ DROPS OPHTHALMIC
Status: DISCONTINUED
Start: 2024-02-17 | End: 2024-02-17 | Stop reason: HOSPADM

## 2024-02-17 RX ORDER — ERYTHROMYCIN 5 MG/G
OINTMENT OPHTHALMIC EVERY 6 HOURS
Qty: 3.5 G | Refills: 0 | Status: SHIPPED | OUTPATIENT
Start: 2024-02-17 | End: 2024-02-24

## 2024-02-17 RX ORDER — SULFACETAMIDE SODIUM 100 MG/ML
SOLUTION/ DROPS OPHTHALMIC
Status: DISCONTINUED
Start: 2024-02-17 | End: 2024-02-17 | Stop reason: HOSPADM

## 2024-02-17 ASSESSMENT — VISUAL ACUITY
OD: 20/20
OS: 20/20
OU: 20/20

## 2024-02-17 NOTE — ED PROVIDER NOTES
[facility] EMERGENCY DEPARTMENT  EMERGENCY DEPARTMENT ENCOUNTER        Pt Name: Bijan Phillips  MRN: [id number]  Birthdate 1965  Date of evaluation: 2/17/2024  Provider: Everette Durán PA-C  PCP: Jacqui Zamarripa MD  Note Started: 2:32 PM EST 2/17/24      CARRIE. I have evaluated this patient.        CHIEF COMPLAINT       Chief Complaint   Patient presents with    Eye Drainage     Pt reporting heavy tears out of eyes since yesterday        HISTORY OF PRESENT ILLNESS: 1 or more Elements     History From: patient  Limitations to history : [facility]  used to obtain history with  line    Bijan Phillips is a 58 y.o. female who presents to the emergency department with a chief complaint of a 2 to 3-day history of some bilateral eye burning and clear tearing.  Worse in her right eye and states she has a foreign body sensation in her right eye.  States she had similar symptoms with tearing about 1 month ago and was seen by her optometrist Dr. German Iraheta and was placed on fluorometholone which she states helped with the tearing until she began to have symptoms again 2 to 3 days ago without injury or trauma.  She denies visual change, purulent drainage, injury, trauma, nausea, vomiting, fevers.  Chronically on Zyrtec and  Ketotifen for her eyes.  Last tetanus vaccination was in 2020.  Denies any other symptoms.    Nursing Notes were all reviewed and agreed with or any disagreements were addressed in the HPI.    REVIEW OF SYSTEMS :      Review of Systems    Positives and Pertinent negatives as per HPI.     SURGICAL HISTORY     Past Surgical History:   Procedure Laterality Date    SHOULDER SURGERY         CURRENTMEDICATIONS       Discharge Medication List as of 2/17/2024  2:00 PM        CONTINUE these medications which have NOT CHANGED    Details   fluticasone (FLONASE) 50 MCG/ACT nasal spray 1 spray by Each Nostril route daily, Disp-32 g, R-1Normal      diclofenac sodium        Patient was given the following medications:  Medications   balanced salts plus (BSS) ophthalmic solution (has no administration in time range)   sulfacetamide (BLEPH-10) 10 % ophthalmic solution (has no administration in time range)   fluorescein 1 MG ophthalmic strip (has no administration in time range)   proparacaine (ALCAINE) 0.5 % ophthalmic solution (has no administration in time range)             Is this patient to be included in the SEP-1 Core Measure due to severe sepsis or septic shock?   No   Exclusion criteria - the patient is NOT to be included for SEP-1 Core Measure due to:  Infection is not suspected    Chronic Conditions affecting care:    has a past medical history of Anxiety, Depression, and Memory disorder.    CONSULTS: (Who and What was discussed)  None      Social Determinants Significantly Affecting Health : None    Records Reviewed (External and Source)     CC/HPI Summary, DDx, ED Course, and Reassessment: Patient presented with some bilateral eye burning and tearing.  Pain immediately improved with instillation of proparacaine.  She has visual acuity of 20/20 bilaterally in each individual eye.  There is nothing to suggest dendritic lesion or HSV.  Low suspicion for glaucoma, preseptal or postseptal cellulitis.  No foreign body of the eyes noted individually.  She is up-to-date on her tetanus.  Will place on erythromycin ointment.  She will follow-up with either her optometrist or [facility] for close follow-up after the weekend.  Return here for any worsening of symptoms or problems at home.    Disposition Considerations (tests considered but not done, Admit vs D/C, Shared Decision Making, Pt Expectation of Test or Tx.):        I am the Primary Clinician of Record.  FINAL IMPRESSION      1. Abrasion of right cornea, initial encounter    2. Conjunctivitis of both eyes, unspecified conjunctivitis type          DISPOSITION/PLAN     DISPOSITION Decision To Discharge 02/17/2024 01:50:42

## 2024-06-09 ENCOUNTER — APPOINTMENT (OUTPATIENT)
Dept: CT IMAGING | Age: 59
End: 2024-06-09

## 2024-06-09 ENCOUNTER — HOSPITAL ENCOUNTER (EMERGENCY)
Age: 59
Discharge: HOME | End: 2024-06-10
Attending: EMERGENCY MEDICINE

## 2024-06-09 DIAGNOSIS — M54.50 ACUTE BILATERAL LOW BACK PAIN WITHOUT SCIATICA: Primary | ICD-10-CM

## 2024-06-09 LAB
ALBUMIN SERPL-MCNC: 4.3 G/DL (ref 3.4–5)
ALBUMIN/GLOB SERPL: 1.5 {RATIO} (ref 1.1–2.2)
ALP SERPL-CCNC: 81 U/L (ref 40–129)
ALT SERPL-CCNC: 41 U/L (ref 10–40)
ANION GAP SERPL CALCULATED.3IONS-SCNC: 11 MMOL/L (ref 3–16)
AST SERPL-CCNC: 46 U/L (ref 15–37)
BACTERIA URNS QL MICRO: NORMAL /HPF
BASOPHILS # BLD: 0 K/UL (ref 0–0.2)
BASOPHILS NFR BLD: 0.7 %
BILIRUB SERPL-MCNC: 0.6 MG/DL (ref 0–1)
BILIRUB UR QL STRIP.AUTO: NEGATIVE
BUN SERPL-MCNC: 11 MG/DL (ref 7–20)
CALCIUM SERPL-MCNC: 9.2 MG/DL (ref 8.3–10.6)
CHLORIDE SERPL-SCNC: 103 MMOL/L (ref 99–110)
CLARITY UR: CLEAR
CO2 SERPL-SCNC: 24 MMOL/L (ref 21–32)
COLOR UR: YELLOW
CREAT SERPL-MCNC: <0.5 MG/DL (ref 0.6–1.1)
DEPRECATED RDW RBC AUTO: 14.4 % (ref 12.4–15.4)
EOSINOPHIL # BLD: 0.4 K/UL (ref 0–0.6)
EOSINOPHIL NFR BLD: 8.7 %
EPI CELLS #/AREA URNS AUTO: 3 /HPF (ref 0–5)
ERYTHROCYTE [SEDIMENTATION RATE] IN BLOOD BY WESTERGREN METHOD: 7 MM/HR (ref 0–30)
GFR SERPLBLD CREATININE-BSD FMLA CKD-EPI: >90 ML/MIN/{1.73_M2}
GLUCOSE SERPL-MCNC: 123 MG/DL (ref 70–99)
GLUCOSE UR STRIP.AUTO-MCNC: NEGATIVE MG/DL
HCT VFR BLD AUTO: 43.6 % (ref 36–48)
HGB BLD-MCNC: 14.9 G/DL (ref 12–16)
HGB UR QL STRIP.AUTO: NEGATIVE
HYALINE CASTS #/AREA URNS AUTO: 0 /LPF (ref 0–8)
KETONES UR STRIP.AUTO-MCNC: NEGATIVE MG/DL
LEUKOCYTE ESTERASE UR QL STRIP.AUTO: ABNORMAL
LIPASE SERPL-CCNC: 55 U/L (ref 13–60)
LYMPHOCYTES # BLD: 1.1 K/UL (ref 1–5.1)
LYMPHOCYTES NFR BLD: 22.6 %
MCH RBC QN AUTO: 30.3 PG (ref 26–34)
MCHC RBC AUTO-ENTMCNC: 34.1 G/DL (ref 31–36)
MCV RBC AUTO: 88.9 FL (ref 80–100)
MONOCYTES # BLD: 0.4 K/UL (ref 0–1.3)
MONOCYTES NFR BLD: 8.1 %
NEUTROPHILS # BLD: 2.9 K/UL (ref 1.7–7.7)
NEUTROPHILS NFR BLD: 59.9 %
NITRITE UR QL STRIP.AUTO: NEGATIVE
PH UR STRIP.AUTO: 5.5 [PH] (ref 5–8)
PLATELET # BLD AUTO: 207 K/UL (ref 135–450)
PMV BLD AUTO: 8.2 FL (ref 5–10.5)
POTASSIUM SERPL-SCNC: 4.1 MMOL/L (ref 3.5–5.1)
PROT SERPL-MCNC: 7.2 G/DL (ref 6.4–8.2)
PROT UR STRIP.AUTO-MCNC: NEGATIVE MG/DL
RBC # BLD AUTO: 4.9 M/UL (ref 4–5.2)
RBC CLUMPS #/AREA URNS AUTO: 1 /HPF (ref 0–4)
SODIUM SERPL-SCNC: 138 MMOL/L (ref 136–145)
SP GR UR STRIP.AUTO: 1.01 (ref 1–1.03)
UA DIPSTICK W REFLEX MICRO PNL UR: YES
URN SPEC COLLECT METH UR: ABNORMAL
UROBILINOGEN UR STRIP-ACNC: 0.2 E.U./DL
WBC # BLD AUTO: 4.9 K/UL (ref 4–11)
WBC #/AREA URNS AUTO: 4 /HPF (ref 0–5)

## 2024-06-09 PROCEDURE — 85025 COMPLETE CBC W/AUTO DIFF WBC: CPT

## 2024-06-09 PROCEDURE — 6370000000 HC RX 637 (ALT 250 FOR IP): Performed by: EMERGENCY MEDICINE

## 2024-06-09 PROCEDURE — 96374 THER/PROPH/DIAG INJ IV PUSH: CPT

## 2024-06-09 PROCEDURE — 81001 URINALYSIS AUTO W/SCOPE: CPT

## 2024-06-09 PROCEDURE — 99285 EMERGENCY DEPT VISIT HI MDM: CPT

## 2024-06-09 PROCEDURE — 83690 ASSAY OF LIPASE: CPT

## 2024-06-09 PROCEDURE — 85652 RBC SED RATE AUTOMATED: CPT

## 2024-06-09 PROCEDURE — 36415 COLL VENOUS BLD VENIPUNCTURE: CPT

## 2024-06-09 PROCEDURE — 74177 CT ABD & PELVIS W/CONTRAST: CPT

## 2024-06-09 PROCEDURE — 93005 ELECTROCARDIOGRAM TRACING: CPT | Performed by: EMERGENCY MEDICINE

## 2024-06-09 PROCEDURE — 6360000004 HC RX CONTRAST MEDICATION: Performed by: EMERGENCY MEDICINE

## 2024-06-09 PROCEDURE — 80053 COMPREHEN METABOLIC PANEL: CPT

## 2024-06-09 RX ORDER — ACETAMINOPHEN 500 MG
1000 TABLET ORAL ONCE
Status: COMPLETED | OUTPATIENT
Start: 2024-06-09 | End: 2024-06-09

## 2024-06-09 RX ORDER — LIDOCAINE 4 G/G
2 PATCH TOPICAL ONCE
Status: DISCONTINUED | OUTPATIENT
Start: 2024-06-09 | End: 2024-06-10 | Stop reason: HOSPADM

## 2024-06-09 RX ORDER — PROCHLORPERAZINE MALEATE 5 MG/1
5 TABLET ORAL ONCE
Status: COMPLETED | OUTPATIENT
Start: 2024-06-09 | End: 2024-06-09

## 2024-06-09 RX ORDER — MORPHINE SULFATE 2 MG/ML
2 INJECTION, SOLUTION INTRAMUSCULAR; INTRAVENOUS ONCE
Status: COMPLETED | OUTPATIENT
Start: 2024-06-09 | End: 2024-06-10

## 2024-06-09 RX ADMIN — ACETAMINOPHEN 1000 MG: 500 TABLET ORAL at 23:00

## 2024-06-09 RX ADMIN — IOPAMIDOL 75 ML: 755 INJECTION, SOLUTION INTRAVENOUS at 23:32

## 2024-06-09 RX ADMIN — PROCHLORPERAZINE MALEATE 5 MG: 5 TABLET ORAL at 23:00

## 2024-06-10 VITALS
DIASTOLIC BLOOD PRESSURE: 71 MMHG | HEART RATE: 80 BPM | BODY MASS INDEX: 29.3 KG/M2 | SYSTOLIC BLOOD PRESSURE: 106 MMHG | TEMPERATURE: 98.5 F | OXYGEN SATURATION: 97 % | WEIGHT: 140.2 LBS | RESPIRATION RATE: 16 BRPM

## 2024-06-10 PROCEDURE — 6360000002 HC RX W HCPCS: Performed by: EMERGENCY MEDICINE

## 2024-06-10 RX ORDER — LIDOCAINE 4 G/G
1 PATCH TOPICAL DAILY
Qty: 5 EACH | Refills: 0 | Status: SHIPPED | OUTPATIENT
Start: 2024-06-10 | End: 2024-06-15

## 2024-06-10 RX ORDER — ACETAMINOPHEN 500 MG
1000 TABLET ORAL 3 TIMES DAILY
Qty: 30 TABLET | Refills: 0 | Status: SHIPPED | OUTPATIENT
Start: 2024-06-10 | End: 2024-06-15

## 2024-06-10 RX ORDER — MELOXICAM 7.5 MG/1
7.5 TABLET ORAL DAILY
Qty: 5 TABLET | Refills: 0 | Status: SHIPPED | OUTPATIENT
Start: 2024-06-10 | End: 2024-06-15

## 2024-06-10 RX ADMIN — MORPHINE SULFATE 2 MG: 2 INJECTION, SOLUTION INTRAMUSCULAR; INTRAVENOUS at 00:15

## 2024-06-10 NOTE — ED NOTES
Pt discharged to home. 3 prescriptions sent to pharmacy, education provided. Will follow up as directed. Verbalized understanding of AVS. Pt awake, alert and oriented. Respirations even and unlabored on room air.

## 2024-06-10 NOTE — DISCHARGE INSTRUCTIONS
Your prescription has been sent to [facility].    Avoid over-the-counter NSAID medications (ex: Ibuprofen, Advil, Motrin, Naproxen, Aleve, Aspirin, etc.) for the next 5 days as they can interact with the medicine we are prescribing for you.    Be sure to contact your primary care provider's office and the physical therapy clinic listed in your paperwork to arrange for follow up visits    If you have any new or worsening issues after going home don't hesitate to return here for reevaluation at any time 24/7!    - - - - - - - - - - - - - - - - - - - -    ??????? ?????????????? [facility] ?????????? ??????? ??    ????? 5 ????? ???? ???-?-??????? NSAID ??????? (??????: Ibuprofen, Advil, Motrin, Naproxen, Aleve, Aspirin, ???) ????????? ?????? ????????? ?????? ??????? ???? ??????? ????? ??????? ??????????? ???? ???????    ????? ???????? ?????? ????????? ???????? ? ??????? ????? ??????? ???????? ?????? ?????? ?????????? ???-?? ?????????? ???????? ???? ??????? ???? ??????? ?????????    ??? ???????? ?? ????? ???? ???? ?? ???????? ???? ?????? ? ??? ???? ??? ??? 24/7 ?? ???: ???????????? ???? ???? ????? ???????????????!  Tap?'??k? priskrip?izabela [facility] ph?rm?s?m? pa?h?'i'?k? corwin.    Ark? 5 dinak? l?gi ?bhara-da-k?'un?no NSAID au?adhihar? (ud?javier?a: Ibuprofen, Advil, Motrin, Naproxen, Aleve, Aspirin, ?di) nadinuh?s kinabhan? tin?noah?l? h?m?l? tap?'im?k? l?gi siph?celeste gar?k? au?adhisam?ga antarakriy? garna sakchan.    ?phn? pr?thamika h?rac?brown prad?yakak? k?ry?hollie ra tap?'??k? k?gaj? k?ryam? s?c?bad'dha phijikala th?r?p? klinikal?'? phal?-apa bhrama?ahar?k? vyavasth? garna samparka garna ni?romana hun?s.    Karol tap?'?nsam?ga ghara ga'?pachi kunai nay?m? v? bigram?guillermo ga'?k? samasy? corwin bhan? kunai anabelle samaya 24/7 m? puna: M?ly??kanak? l?gi yah?m? pharkana nahicakic?'unuh?s!

## 2024-06-11 LAB
EKG ATRIAL RATE: 77 BPM
EKG DIAGNOSIS: NORMAL
EKG P AXIS: -16 DEGREES
EKG P-R INTERVAL: 130 MS
EKG Q-T INTERVAL: 390 MS
EKG QRS DURATION: 78 MS
EKG QTC CALCULATION (BAZETT): 441 MS
EKG R AXIS: -11 DEGREES
EKG T AXIS: 29 DEGREES
EKG VENTRICULAR RATE: 77 BPM

## 2024-06-11 PROCEDURE — 93010 ELECTROCARDIOGRAM REPORT: CPT | Performed by: INTERNAL MEDICINE

## 2024-06-25 ASSESSMENT — ENCOUNTER SYMPTOMS
COUGH: 0
VOMITING: 0
SHORTNESS OF BREATH: 0
NAUSEA: 1
BACK PAIN: 1
ABDOMINAL PAIN: 1

## 2024-06-25 NOTE — ED PROVIDER NOTES
[facility] EMERGENCY DEPARTMENT    Name: Bijan Phillips : 1965 MRN: [id number] Date of Service: 2024    Initial VS: BP: (!) 124/58, Temp: 98.5 °F (36.9 °C), Pulse: 80, Respirations: 16, SpO2: 100 %     History obtained with assistance from a Polish .    CC: back pain    HPI: this patient is a 58 y.o. female presenting to the ED from home. Ms. Phillips tells me that at least for the last 3 to 4 years she has been struggling with chronic and/more frequent lower back pain.  In recent days this has been more prominent and she has been struggling with intense pain all across the lowermost portion of her back.  This pain seems to be a little bit more severe on her right side than on her left side.  This pain often radiates into her sides and seldomly radiates into her lower abdomen.  Along with this pain she notes that she has not been urinating very much in recent days; her family member at bedside believes that this is because she has not been eating or drinking very much lately.  More so, she has been feeling nauseous intermittently.  As her condition did not seem to be improving over time she had her family members bring her here tonight to be evaluated.  She has not appreciated other changes from her usual state of health and she denies other current complaints.  Specifically, Ms. Phillips has not appreciated any associated fevers, bladder or bowel incontinence, urinary retention, saddle anesthesia, weakness, or numbness. She hasn't had any recent falls, accidents, or direct trauma to her back.   _____________________________________________________________________    Past Medical History:   Diagnosis Date    Cataracts     Hepatic steatosis     Mood disorders     Overweight     Prediabetes     Pulmonary granulomas     Recurrent headaches     Tobacco use disorder       Past Surgical History:   Procedure Laterality Date    APPENDECTOMY      SHOULDER SURGERY       Family History

## 2025-02-15 ENCOUNTER — OFFICE VISIT (OUTPATIENT)
Age: 60
End: 2025-02-15

## 2025-02-15 VITALS
HEART RATE: 101 BPM | RESPIRATION RATE: 20 BRPM | HEIGHT: 61 IN | BODY MASS INDEX: 28.22 KG/M2 | OXYGEN SATURATION: 94 % | TEMPERATURE: 98 F | WEIGHT: 149.5 LBS | SYSTOLIC BLOOD PRESSURE: 102 MMHG | DIASTOLIC BLOOD PRESSURE: 62 MMHG

## 2025-02-15 DIAGNOSIS — J40 BRONCHITIS: Primary | ICD-10-CM

## 2025-02-15 RX ORDER — KETOTIFEN FUMARATE 0.35 MG/ML
SOLUTION/ DROPS OPHTHALMIC
COMMUNITY
Start: 2025-02-04

## 2025-02-15 RX ORDER — DOXYCYCLINE HYCLATE 100 MG
100 TABLET ORAL 2 TIMES DAILY
Qty: 20 TABLET | Refills: 0 | Status: SHIPPED | OUTPATIENT
Start: 2025-02-15 | End: 2025-02-25

## 2025-02-15 RX ORDER — DULOXETIN HYDROCHLORIDE 30 MG/1
30 CAPSULE, DELAYED RELEASE ORAL 2 TIMES DAILY
COMMUNITY
Start: 2025-02-04

## 2025-02-15 RX ORDER — FLUTICASONE PROPIONATE 50 MCG
SPRAY, SUSPENSION (ML) NASAL
COMMUNITY
Start: 2024-12-23

## 2025-02-15 RX ORDER — IBUPROFEN 800 MG/1
TABLET, FILM COATED ORAL
COMMUNITY
Start: 2025-01-10

## 2025-02-15 RX ORDER — METHOCARBAMOL 750 MG/1
TABLET, FILM COATED ORAL
COMMUNITY
Start: 2025-01-10

## 2025-02-15 RX ORDER — ALBUTEROL SULFATE 90 UG/1
2 INHALANT RESPIRATORY (INHALATION) 4 TIMES DAILY PRN
Qty: 18 G | Refills: 0 | Status: SHIPPED | OUTPATIENT
Start: 2025-02-15

## 2025-02-15 RX ORDER — FLUTICASONE PROPIONATE AND SALMETEROL XINAFOATE 45; 21 UG/1; UG/1
AEROSOL, METERED RESPIRATORY (INHALATION)
COMMUNITY
Start: 2025-01-15

## 2025-02-15 RX ORDER — MECLIZINE HYDROCHLORIDE 25 MG/1
25 TABLET ORAL 3 TIMES DAILY PRN
COMMUNITY
Start: 2025-02-01

## 2025-02-15 RX ORDER — METHYLPREDNISOLONE 4 MG/1
TABLET ORAL
Qty: 1 KIT | Refills: 0 | Status: SHIPPED | OUTPATIENT
Start: 2025-02-15 | End: 2025-02-21

## 2025-02-15 RX ORDER — OMEGA-3S/DHA/EPA/FISH OIL/D3 300MG-1000
400 CAPSULE ORAL DAILY
COMMUNITY
Start: 2025-02-01

## 2025-02-15 RX ORDER — MELOXICAM 15 MG/1
15 TABLET ORAL DAILY
COMMUNITY
Start: 2025-02-01

## 2025-02-15 RX ORDER — BENZONATATE 100 MG/1
100 CAPSULE ORAL 3 TIMES DAILY PRN
Qty: 30 CAPSULE | Refills: 0 | Status: SHIPPED | OUTPATIENT
Start: 2025-02-15 | End: 2025-02-25

## 2025-02-15 RX ORDER — CETIRIZINE HYDROCHLORIDE 10 MG/1
10 TABLET ORAL DAILY
COMMUNITY
Start: 2025-02-01

## 2025-02-15 ASSESSMENT — ENCOUNTER SYMPTOMS
COUGH: 1
WHEEZING: 1
CHEST TIGHTNESS: 1

## 2025-02-15 NOTE — PROGRESS NOTES
Nawaf Ramirez (:  1965) is a 59 y.o. female,New patient, here for evaluation of the following chief complaint(s):  Cough (Cough x 1 month, headahce )      ASSESSMENT/PLAN:    ICD-10-CM    1. Bronchitis  J40 doxycycline hyclate (VIBRA-TABS) 100 MG tablet     methylPREDNISolone (MEDROL DOSEPACK) 4 MG tablet     benzonatate (TESSALON) 100 MG capsule     albuterol sulfate HFA (VENTOLIN HFA) 108 (90 Base) MCG/ACT inhaler          Tylenol or Ibuprofen for pain and fever as needed.  Cool mist humidifier  Follow up in 3-5 days if symptoms persist or if symptoms worsen.    SUBJECTIVE/OBJECTIVE:    Cough  Associated symptoms include wheezing.     HPI:   59 y.o. female presents with symptoms of cough x 1 month with a HA. Denies fever. Has taken OTC cough meds for symptoms no relief    Vitals:    02/15/25 1130   BP: 102/62   Site: Left Upper Arm   Position: Sitting   Cuff Size: Medium Adult   Pulse: (!) 101   Resp: 20   Temp: 98 °F (36.7 °C)   TempSrc: Oral   SpO2: 94%   Weight: 67.8 kg (149 lb 8 oz)   Height: 1.549 m (5' 1\")       Review of Systems   Respiratory:  Positive for cough, chest tightness and wheezing.    All other systems reviewed and are negative.      Physical Exam  Vitals and nursing note reviewed.   Constitutional:       Appearance: Normal appearance.   HENT:      Head: Normocephalic and atraumatic.      Right Ear: Tympanic membrane normal.      Left Ear: Tympanic membrane normal.      Mouth/Throat:      Pharynx: No posterior oropharyngeal erythema.   Eyes:      Extraocular Movements: Extraocular movements intact.      Pupils: Pupils are equal, round, and reactive to light.   Cardiovascular:      Rate and Rhythm: Normal rate and regular rhythm.   Pulmonary:      Effort: Pulmonary effort is normal.      Breath sounds: Wheezing present.   Abdominal:      General: Abdomen is flat. Bowel sounds are normal.      Palpations: Abdomen is soft.   Musculoskeletal:         General: No swelling or tenderness.